# Patient Record
Sex: FEMALE | Race: BLACK OR AFRICAN AMERICAN | NOT HISPANIC OR LATINO | Employment: UNEMPLOYED | URBAN - METROPOLITAN AREA
[De-identification: names, ages, dates, MRNs, and addresses within clinical notes are randomized per-mention and may not be internally consistent; named-entity substitution may affect disease eponyms.]

---

## 2017-11-16 ENCOUNTER — HISTORICAL (OUTPATIENT)
Dept: FAMILY MEDICINE | Facility: CLINIC | Age: 82
End: 2017-11-16

## 2018-03-15 ENCOUNTER — HISTORICAL (OUTPATIENT)
Dept: ADMINISTRATIVE | Facility: HOSPITAL | Age: 83
End: 2018-03-15

## 2018-03-15 LAB
BUN SERPL-MCNC: 23 MG/DL (ref 7–18)
CALCIUM SERPL-MCNC: 9 MG/DL (ref 8.5–10.1)
CHLORIDE SERPL-SCNC: 107 MMOL/L (ref 98–107)
CO2 SERPL-SCNC: 29 MMOL/L (ref 21–32)
CREAT SERPL-MCNC: 1.2 MG/DL (ref 0.6–1.3)
CREAT/UREA NIT SERPL: 19
EST. AVERAGE GLUCOSE BLD GHB EST-MCNC: 194 MG/DL
GLUCOSE SERPL-MCNC: 87 MG/DL (ref 74–106)
HBA1C MFR BLD: 8.4 % (ref 4.2–6.3)
POTASSIUM SERPL-SCNC: 4.2 MMOL/L (ref 3.5–5.1)
SODIUM SERPL-SCNC: 144 MMOL/L (ref 136–145)

## 2018-04-26 ENCOUNTER — HISTORICAL (OUTPATIENT)
Dept: FAMILY MEDICINE | Facility: CLINIC | Age: 83
End: 2018-04-26

## 2018-06-26 ENCOUNTER — HISTORICAL (OUTPATIENT)
Dept: ENDOSCOPY | Facility: HOSPITAL | Age: 83
End: 2018-06-26

## 2018-07-05 ENCOUNTER — HISTORICAL (OUTPATIENT)
Dept: ADMINISTRATIVE | Facility: HOSPITAL | Age: 83
End: 2018-07-05

## 2018-07-05 LAB
ABS NEUT (OLG): 5.41 X10(3)/MCL (ref 2.1–9.2)
ALBUMIN SERPL-MCNC: 3.5 GM/DL (ref 3.4–5)
ALBUMIN/GLOB SERPL: 1 RATIO (ref 1–2)
ALP SERPL-CCNC: 84 UNIT/L (ref 45–117)
ALT SERPL-CCNC: 11 UNIT/L (ref 12–78)
AST SERPL-CCNC: 11 UNIT/L (ref 15–37)
BASOPHILS # BLD AUTO: 0.05 X10(3)/MCL
BASOPHILS NFR BLD AUTO: 1 %
BILIRUB SERPL-MCNC: 0.5 MG/DL (ref 0.2–1)
BILIRUBIN DIRECT+TOT PNL SERPL-MCNC: 0.1 MG/DL
BILIRUBIN DIRECT+TOT PNL SERPL-MCNC: 0.4 MG/DL
BUN SERPL-MCNC: 19 MG/DL (ref 7–18)
CALCIUM SERPL-MCNC: 9 MG/DL (ref 8.5–10.1)
CHLORIDE SERPL-SCNC: 108 MMOL/L (ref 98–107)
CHOLEST SERPL-MCNC: 176 MG/DL
CHOLEST/HDLC SERPL: 2.3 {RATIO} (ref 0–4.4)
CO2 SERPL-SCNC: 29 MMOL/L (ref 21–32)
CREAT SERPL-MCNC: 1.2 MG/DL (ref 0.6–1.3)
DEPRECATED CALCIDIOL+CALCIFEROL SERPL-MC: 21.38 NG/ML (ref 30–80)
EOSINOPHIL # BLD AUTO: 0.12 X10(3)/MCL
EOSINOPHIL NFR BLD AUTO: 1 %
ERYTHROCYTE [DISTWIDTH] IN BLOOD BY AUTOMATED COUNT: 14.4 % (ref 11.5–14.5)
GLOBULIN SER-MCNC: 3.9 GM/ML (ref 2.3–3.5)
GLUCOSE SERPL-MCNC: 132 MG/DL (ref 74–106)
HCT VFR BLD AUTO: 35.4 % (ref 35–46)
HDLC SERPL-MCNC: 77 MG/DL
HGB BLD-MCNC: 10.6 GM/DL (ref 12–16)
IMM GRANULOCYTES # BLD AUTO: 0.02 10*3/UL
IMM GRANULOCYTES NFR BLD AUTO: 0 %
LDLC SERPL CALC-MCNC: 69 MG/DL (ref 0–130)
LYMPHOCYTES # BLD AUTO: 2.76 X10(3)/MCL
LYMPHOCYTES NFR BLD AUTO: 30 % (ref 13–40)
MCH RBC QN AUTO: 28.8 PG (ref 26–34)
MCHC RBC AUTO-ENTMCNC: 29.9 GM/DL (ref 31–37)
MCV RBC AUTO: 96.2 FL (ref 80–100)
MONOCYTES # BLD AUTO: 0.72 X10(3)/MCL
MONOCYTES NFR BLD AUTO: 8 % (ref 4–12)
NEUTROPHILS # BLD AUTO: 5.41 X10(3)/MCL
NEUTROPHILS NFR BLD AUTO: 60 X10(3)/MCL
PLATELET # BLD AUTO: 361 X10(3)/MCL (ref 130–400)
PMV BLD AUTO: 10.1 FL (ref 7.4–10.4)
POTASSIUM SERPL-SCNC: 4.6 MMOL/L (ref 3.5–5.1)
PROT SERPL-MCNC: 7.4 GM/DL (ref 6.4–8.2)
RBC # BLD AUTO: 3.68 X10(6)/MCL (ref 4–5.2)
SODIUM SERPL-SCNC: 143 MMOL/L (ref 136–145)
TRIGL SERPL-MCNC: 151 MG/DL
VLDLC SERPL CALC-MCNC: 30 MG/DL
WBC # SPEC AUTO: 9.1 X10(3)/MCL (ref 4.5–11)

## 2019-04-25 ENCOUNTER — HISTORICAL (OUTPATIENT)
Dept: RADIOLOGY | Facility: HOSPITAL | Age: 84
End: 2019-04-25

## 2019-07-01 ENCOUNTER — HISTORICAL (OUTPATIENT)
Dept: RADIOLOGY | Facility: HOSPITAL | Age: 84
End: 2019-07-01

## 2019-09-20 ENCOUNTER — HISTORICAL (OUTPATIENT)
Dept: RADIOLOGY | Facility: HOSPITAL | Age: 84
End: 2019-09-20

## 2021-03-25 ENCOUNTER — HISTORICAL (OUTPATIENT)
Dept: ADMINISTRATIVE | Facility: HOSPITAL | Age: 86
End: 2021-03-25

## 2021-03-25 LAB — EST CREAT CLEARANCE SER (OHS): 20.9 ML/MIN

## 2021-03-29 ENCOUNTER — HISTORICAL (OUTPATIENT)
Dept: RADIOLOGY | Facility: HOSPITAL | Age: 86
End: 2021-03-29

## 2021-09-09 ENCOUNTER — HISTORICAL (OUTPATIENT)
Dept: ADMINISTRATIVE | Facility: HOSPITAL | Age: 86
End: 2021-09-09

## 2021-09-23 ENCOUNTER — HISTORICAL (OUTPATIENT)
Dept: RADIOLOGY | Facility: HOSPITAL | Age: 86
End: 2021-09-23

## 2021-11-24 ENCOUNTER — HISTORICAL (OUTPATIENT)
Dept: ADMINISTRATIVE | Facility: HOSPITAL | Age: 86
End: 2021-11-24

## 2021-11-26 LAB — FINAL CULTURE: NORMAL

## 2022-02-16 ENCOUNTER — HOSPITAL ENCOUNTER (OUTPATIENT)
Dept: MEDSURG UNIT | Facility: HOSPITAL | Age: 87
End: 2022-02-21
Attending: HOSPITALIST

## 2022-02-16 ENCOUNTER — HISTORICAL (OUTPATIENT)
Dept: ADMINISTRATIVE | Facility: HOSPITAL | Age: 87
End: 2022-02-16

## 2022-02-16 LAB
ABS NEUT (OLG): 8.11 (ref 2.1–9.2)
ALBUMIN SERPL-MCNC: 3.3 G/DL (ref 3.4–4.8)
ALBUMIN/GLOB SERPL: 0.9 {RATIO} (ref 1.1–2)
ALP SERPL-CCNC: 134 U/L (ref 40–150)
ALT SERPL-CCNC: 7 U/L (ref 0–55)
AST SERPL-CCNC: 7 U/L (ref 5–34)
B-OH-BUTYR SERPL-MCNC: 0.16 MG/DL
B-OH-BUTYR SERPL-MCNC: 0.19 MG/DL
BASOPHILS # BLD AUTO: 0 10*3/UL (ref 0–0.2)
BASOPHILS NFR BLD AUTO: 0 %
BILIRUB SERPL-MCNC: 0.5 MG/DL
BILIRUBIN DIRECT+TOT PNL SERPL-MCNC: 0.2 (ref 0–0.5)
BILIRUBIN DIRECT+TOT PNL SERPL-MCNC: 0.3 (ref 0–0.8)
BUN SERPL-MCNC: 28.5 MG/DL (ref 9.8–20.1)
BUN SERPL-MCNC: 32.2 MG/DL (ref 9.8–20.1)
BUN SERPL-MCNC: 33.3 MG/DL (ref 9.8–20.1)
CALCIUM SERPL-MCNC: 8.5 MG/DL (ref 8.7–10.5)
CALCIUM SERPL-MCNC: 8.6 MG/DL (ref 8.7–10.5)
CALCIUM SERPL-MCNC: 9.4 MG/DL (ref 8.7–10.5)
CBG: 425 (ref 70–115)
CBG: 517 (ref 70–115)
CBG: >600 (ref 70–115)
CBG: >600 (ref 70–115)
CHLORIDE SERPL-SCNC: 103 MMOL/L (ref 98–111)
CHLORIDE SERPL-SCNC: 107 MMOL/L (ref 98–111)
CHLORIDE SERPL-SCNC: 93 MMOL/L (ref 98–111)
CO2 SERPL-SCNC: 14 MMOL/L (ref 23–31)
CO2 SERPL-SCNC: 16 MMOL/L (ref 23–31)
CO2 SERPL-SCNC: 18 MMOL/L (ref 23–31)
CREAT SERPL-MCNC: 1.67 MG/DL (ref 0.55–1.02)
CREAT SERPL-MCNC: 2.1 MG/DL (ref 0.55–1.02)
CREAT SERPL-MCNC: 2.5 MG/DL (ref 0.55–1.02)
CREAT/UREA NIT SERPL: 15
CREAT/UREA NIT SERPL: 17
EOSINOPHIL # BLD AUTO: 0 10*3/UL (ref 0–0.9)
EOSINOPHIL NFR BLD AUTO: 0 %
ERYTHROCYTE [DISTWIDTH] IN BLOOD BY AUTOMATED COUNT: 14.5 % (ref 11.5–14.5)
FLAG2 (OHS): 70
FLAG3 (OHS): 80
FLAGS (OHS): 80
GLOBULIN SER-MCNC: 3.7 G/DL (ref 2.4–3.5)
GLUCOSE SERPL-MCNC: 480 MG/DL (ref 75–121)
GLUCOSE SERPL-MCNC: 561 MG/DL (ref 75–121)
GLUCOSE SERPL-MCNC: 826 MG/DL (ref 75–121)
HCO3 UR-SCNC: 17.6 MMOL/L (ref 22–26)
HCT VFR BLD AUTO: 36.3 % (ref 35–46)
HEMOLYSIS INTERF INDEX SERPL-ACNC: 15
HEMOLYSIS INTERF INDEX SERPL-ACNC: 15
HEMOLYSIS INTERF INDEX SERPL-ACNC: 2
HEMOLYSIS INTERF INDEX SERPL-ACNC: 2
HEMOLYSIS INTERF INDEX SERPL-ACNC: 29
HEMOLYSIS INTERF INDEX SERPL-ACNC: 29
HGB BLD-MCNC: 11.5 G/DL (ref 12–16)
ICED SAMPLE: NO
ICTERIC INTERF INDEX SERPL-ACNC: 0
IMM GRANULOCYTES # BLD AUTO: 0.04 10*3/UL
IMM GRANULOCYTES NFR BLD AUTO: 0 %
IMM. NE 2 SUSPECT FLAG (OHS): 10
LIPEMIC INTERF INDEX SERPL-ACNC: 2
LIPEMIC INTERF INDEX SERPL-ACNC: <0
LIPEMIC INTERF INDEX SERPL-ACNC: <0
LOW EVENT # SUSPECT FLAG (OHS): 80
LYMPHOCYTES # BLD AUTO: 1.7 10*3/UL (ref 0.6–4.6)
LYMPHOCYTES NFR BLD AUTO: 16 %
MAGNESIUM SERPL-MCNC: 1.8 MG/DL (ref 1.6–2.6)
MAGNESIUM SERPL-MCNC: 1.9 MG/DL (ref 1.6–2.6)
MAGNESIUM SERPL-MCNC: 2.1 MG/DL (ref 1.6–2.6)
MANUAL DIFF? (OHS): NO
MCH RBC QN AUTO: 28.7 PG (ref 26–34)
MCHC RBC AUTO-ENTMCNC: 31.7 G/DL (ref 31–37)
MCV RBC AUTO: 90.5 FL (ref 80–100)
MO BLASTS SUSPECT FLAG (OHS): 30
MONOCYTES # BLD AUTO: 0.6 10*3/UL (ref 0.1–1.3)
MONOCYTES NFR BLD AUTO: 6 %
NEUTROPHILS # BLD AUTO: 8.11 10*3/UL (ref 2.1–9.2)
NEUTROPHILS NFR BLD AUTO: 77 %
NRBC BLD AUTO-RTO: 0 % (ref 0–0.2)
O2 HGB ARTERIAL: 91.7 (ref 94–100)
OSMOLALITY SERPL: 312 MOSM/KG (ref 280–300)
PCO2 BLDA: 35 MM[HG] (ref 35–45)
PH SMN: 7.31 [PH] (ref 7.35–7.45)
PHOSPHATE SERPL-MCNC: 2.8 MG/DL (ref 2.3–4.7)
PHOSPHATE SERPL-MCNC: 3.1 MG/DL (ref 2.3–4.7)
PLATELET # BLD AUTO: 381 10*3/UL (ref 130–400)
PLATELET CLUMPS SUSPECT FLAG (OHS): 30
PMV BLD AUTO: 10.7 FL (ref 7.4–10.4)
PO2 BLDA: 75 MM[HG] (ref 80–100)
POC ALLENS TEST: POSITIVE
POC BE: -7.9 (ref -2–2)
POC CO HGB: 4
POC CO2: 18.7 (ref 22–26)
POC MET HGB: 1.3 (ref 0.4–1.5)
POC SAMPLESOURCE: NORMAL
POC SATURATED O2: 96.7
POC SITE: NORMAL
POC TECH: NORMAL
POC THB: 11 (ref 12–16)
POC TREATMENT: NORMAL
POTASSIUM SERPL-SCNC: 5 MMOL/L (ref 3.5–5.1)
POTASSIUM SERPL-SCNC: 5 MMOL/L (ref 3.5–5.1)
POTASSIUM SERPL-SCNC: 5.5 MMOL/L (ref 3.5–5.1)
PROT SERPL-MCNC: 7 G/DL (ref 5.8–7.6)
RBC # BLD AUTO: 4.01 10*6/UL (ref 4–5.2)
SARS-COV-2 AG RESP QL IA.RAPID: NEGATIVE
SODIUM SERPL-SCNC: 124 MMOL/L (ref 132–146)
SODIUM SERPL-SCNC: 129 MMOL/L (ref 132–146)
SODIUM SERPL-SCNC: 132 MMOL/L (ref 132–146)
TROPONIN I SERPL-MCNC: <0.01 NG/ML (ref 0–0.04)
WBC # SPEC AUTO: 10.5 10*3/UL (ref 4.5–11)

## 2022-02-17 LAB
ABS NEUT (OLG): 6.58 (ref 2.1–9.2)
BASOPHILS # BLD AUTO: 0 10*3/UL (ref 0–0.2)
BASOPHILS NFR BLD AUTO: 0 %
BUN SERPL-MCNC: 20.5 MG/DL (ref 9.8–20.1)
BUN SERPL-MCNC: 23.1 MG/DL (ref 9.8–20.1)
BUN SERPL-MCNC: 25.8 MG/DL (ref 9.8–20.1)
CALCIUM SERPL-MCNC: 7.9 MG/DL (ref 8.7–10.5)
CALCIUM SERPL-MCNC: 8.2 MG/DL (ref 8.7–10.5)
CALCIUM SERPL-MCNC: 9.2 MG/DL (ref 8.7–10.5)
CBG: 263 (ref 70–115)
CBG: 311 (ref 70–115)
CBG: 337 (ref 70–115)
CBG: 357 (ref 70–115)
CBG: 367 (ref 70–115)
CHLORIDE SERPL-SCNC: 109 MMOL/L (ref 98–111)
CHLORIDE SERPL-SCNC: 110 MMOL/L (ref 98–111)
CHLORIDE SERPL-SCNC: 110 MMOL/L (ref 98–111)
CO2 SERPL-SCNC: 16 MMOL/L (ref 23–31)
CO2 SERPL-SCNC: 18 MMOL/L (ref 23–31)
CO2 SERPL-SCNC: 18 MMOL/L (ref 23–31)
CREAT SERPL-MCNC: 1.29 MG/DL (ref 0.55–1.02)
CREAT SERPL-MCNC: 1.31 MG/DL (ref 0.55–1.02)
CREAT SERPL-MCNC: 1.48 MG/DL (ref 0.55–1.02)
CREAT/UREA NIT SERPL: 16
CREAT/UREA NIT SERPL: 17
CREAT/UREA NIT SERPL: 18
EOSINOPHIL # BLD AUTO: 0.1 10*3/UL (ref 0–0.9)
EOSINOPHIL NFR BLD AUTO: 1 %
ERYTHROCYTE [DISTWIDTH] IN BLOOD BY AUTOMATED COUNT: 14.5 % (ref 11.5–14.5)
FLAG2 (OHS): 70
FLAG3 (OHS): 80
FLAGS (OHS): 80
GLUCOSE SERPL-MCNC: 341 MG/DL (ref 75–121)
GLUCOSE SERPL-MCNC: 415 MG/DL (ref 75–121)
GLUCOSE SERPL-MCNC: 496 MG/DL (ref 75–121)
HCT VFR BLD AUTO: 29.8 % (ref 35–46)
HEMOLYSIS INTERF INDEX SERPL-ACNC: 1
HEMOLYSIS INTERF INDEX SERPL-ACNC: 5
HGB BLD-MCNC: 9.4 G/DL (ref 12–16)
ICTERIC INTERF INDEX SERPL-ACNC: 0
IMM GRANULOCYTES # BLD AUTO: 0.04 10*3/UL
IMM GRANULOCYTES NFR BLD AUTO: 0 %
LIPEMIC INTERF INDEX SERPL-ACNC: 1
LIPEMIC INTERF INDEX SERPL-ACNC: 3
LIPEMIC INTERF INDEX SERPL-ACNC: <0
LOW EVENT # SUSPECT FLAG (OHS): 80
LYMPHOCYTES # BLD AUTO: 2 10*3/UL (ref 0.6–4.6)
LYMPHOCYTES NFR BLD AUTO: 21 %
MAGNESIUM SERPL-MCNC: 2.4 MG/DL (ref 1.6–2.6)
MAGNESIUM SERPL-MCNC: 2.7 MG/DL (ref 1.6–2.6)
MANUAL DIFF? (OHS): NO
MCH RBC QN AUTO: 28.8 PG (ref 26–34)
MCHC RBC AUTO-ENTMCNC: 31.5 G/DL (ref 31–37)
MCV RBC AUTO: 91.4 FL (ref 80–100)
MO BLASTS SUSPECT FLAG (OHS): 30
MONOCYTES # BLD AUTO: 0.6 10*3/UL (ref 0.1–1.3)
MONOCYTES NFR BLD AUTO: 7 %
NEUTROPHILS # BLD AUTO: 6.58 10*3/UL (ref 2.1–9.2)
NEUTROPHILS NFR BLD AUTO: 70 %
NRBC BLD AUTO-RTO: 0 % (ref 0–0.2)
PHOSPHATE SERPL-MCNC: 3.3 MG/DL (ref 2.3–4.7)
PHOSPHATE SERPL-MCNC: 4 MG/DL (ref 2.3–4.7)
PLATELET # BLD AUTO: 294 10*3/UL (ref 130–400)
PLATELET CLUMPS SUSPECT FLAG (OHS): 20
PMV BLD AUTO: 10.4 FL (ref 7.4–10.4)
POTASSIUM SERPL-SCNC: 4.2 MMOL/L (ref 3.5–5.1)
POTASSIUM SERPL-SCNC: 4.5 MMOL/L (ref 3.5–5.1)
POTASSIUM SERPL-SCNC: 4.5 MMOL/L (ref 3.5–5.1)
RBC # BLD AUTO: 3.26 10*6/UL (ref 4–5.2)
SODIUM SERPL-SCNC: 133 MMOL/L (ref 132–146)
SODIUM SERPL-SCNC: 135 MMOL/L (ref 132–146)
SODIUM SERPL-SCNC: 135 MMOL/L (ref 132–146)
WBC # SPEC AUTO: 9.4 10*3/UL (ref 4.5–11)

## 2022-02-18 LAB
BUN SERPL-MCNC: 17.3 MG/DL (ref 9.8–20.1)
CALCIUM SERPL-MCNC: 9.2 MG/DL (ref 8.7–10.5)
CBG: 118 (ref 70–115)
CBG: 160 (ref 70–115)
CBG: 354 (ref 70–115)
CBG: 355 (ref 70–115)
CBG: 369 (ref 70–115)
CBG: 75 (ref 70–115)
CHLORIDE SERPL-SCNC: 112 MMOL/L (ref 98–111)
CO2 SERPL-SCNC: 20 MMOL/L (ref 23–31)
CREAT SERPL-MCNC: 1.05 MG/DL (ref 0.55–1.02)
CREAT/UREA NIT SERPL: 16
GLUCOSE SERPL-MCNC: 83 MG/DL (ref 75–121)
HEMOLYSIS INTERF INDEX SERPL-ACNC: 3
ICTERIC INTERF INDEX SERPL-ACNC: 0
LIPEMIC INTERF INDEX SERPL-ACNC: <0
POTASSIUM SERPL-SCNC: 4.2 MMOL/L (ref 3.5–5.1)
SODIUM SERPL-SCNC: 138 MMOL/L (ref 132–146)

## 2022-02-19 LAB
CBG: 165 (ref 70–115)
CBG: 167 (ref 70–115)
CBG: 249 (ref 70–115)
CBG: 287 (ref 70–115)
CBG: 297 (ref 70–115)
CBG: 301 (ref 70–115)
CBG: 400 (ref 70–115)

## 2022-02-20 LAB
CBG: 128 (ref 70–115)
CBG: 151 (ref 70–115)
CBG: 253 (ref 70–115)
CBG: 272 (ref 70–115)
CBG: 414 (ref 70–115)

## 2022-02-21 LAB
CBG: 195 (ref 70–115)
CBG: 318 (ref 70–115)
CBG: 73 (ref 70–115)
CBG: 81 (ref 70–115)

## 2022-04-07 ENCOUNTER — HISTORICAL (OUTPATIENT)
Dept: ADMINISTRATIVE | Facility: HOSPITAL | Age: 87
End: 2022-04-07
Payer: MEDICAID

## 2022-04-24 VITALS
WEIGHT: 158.75 LBS | SYSTOLIC BLOOD PRESSURE: 115 MMHG | BODY MASS INDEX: 29.21 KG/M2 | OXYGEN SATURATION: 100 % | HEIGHT: 62 IN | DIASTOLIC BLOOD PRESSURE: 72 MMHG

## 2022-05-01 NOTE — HISTORICAL [facility] CERNER
This is a historical note converted from [employer]. Formatting and pictures may have been removed.  Please reference [employer]jose for original formatting and attached multimedia. Chief Complaint  HERE FOR FOLLOWUP VISIT C/O NOT SLEEPING AT TIME C/O BURING SENSTION TO THROAT  History of Present Illness  87 yo F here for f/u on chronic conditions .  ?   Acute Issues: Patient is doing well today. Offers no new complaints or concerns. Compliant with medications. Checks sugars at home occasionally, ranging 115-195. Last HgbA1c was 8.4?in 03/2018.?Does not check BP at home, but is compliant with medication. Does not exercise and is not compliant with diet. BMs have been regular, every other day. Patient admits to not sleeping well at night. Having trouble falling asleep. Neuropathy is improving. GERD is stable, not worsening. Carotid US was performed on 4/2018, no significant plaque disease.Patient had cystoscopy done 04/2018, which demonstrated some bladder hyperplasia, but otherwise normal.  ?   Chronic Issues:  DM  CKD  HTN  HLD  Microscopic Hematuria  GERD  Insomnia  Neuropathy (lower extremity)  Chronic Constipation  Tobacco Use  ?  ?   Falls: No recent?falls  Surgeries: No surgeries  Social: Lives at home with . No pets in home. Smoke detectors present. No stairs in home. AC and Heat present. Has family members nearby. Smoke about 3 cigarettes daily. Non-drinker. No illicit drug use. Patient is retired.  ADLs: Performs all by self  Diet:?Eats?3 meals daily.?Consumes toast, coffee, vegetables, meat, fish rice, chicken. Drinks water daily.  ?  Health Maintenance:  Colonoscopy: NEG (07/2008). Patient refuses repeat.  Tdap: (03/2012)  Flu: (11/2017)  Pneumovax: (11/2006)  Prevnar: (12/2006)  Zostavax:  Eye Exam: (11/2017)  CAGE: NEG  Mammogram: NEG (10/2014). Refuses repeat  DEXA: Osteopenia: Z score: -1.5 (2007)  ?  Review of Systems  Constitutional: no fever, no chills, no weight loss  CV: no swelling, no edema, no  chest pain, no palpitations  ENT: no cough, no nasal congestion  : no urinary retention, no urinary incontinence  GI: no fecal incontinence, no constipation, no diarrhea, no nausea, no vomiting  RESP: no SOB, no wheezing, no difficulty breathing  Skin: no rash, no wound, no discolorations  Neuro: no numbness/tingling, no weakness, no saddle anesthesia, no syncope  MSK: no limb swelling, no limb deformities, no gait disturbances  Psych: no depression, no anxiety  ?  Physical Exam  Vitals & Measurements  T:?36.7? ?C (Oral)? HR:?73(Peripheral)? RR:?20? BP:?149/69? SpO2:?99%? WT:?74.3?kg?  General: well developed; appears happy and cooperative  PSYCH: alert and oriented with?appropriate mood and affect  ENT: oral mucosa moist, no pharyngeal erythema, EOMI, no pallor conjunctiva  SKIN: inspection and palpation of skin and soft tissue normal; no scars noted on upper/lower extremities  RESP: lungs CTA b/l, no wheezing, non-labored respirations  CV: +2 symmetrical pulses (radial), no edema, no murmurs, rubs or gallops upon auscultation  NEURO: sensation intact by light touch of the lower and upper extremities b/l; no focal deficits  MSK: no physical deformities, no gait disturbances  ABD: NTTP, BS +4, no organomegaly  ?   Monofilament test performed today: No sensory deficits  Assessment/Plan  1.?Diabetes  -Continue diabetic diet  -Continue Novolog and Metformin  -Continue valsartan daily  Ordered:  ?  2.?CKD (chronic kidney disease)  ?-Avoid Nephrotoxic drugs  ?-drink plenty of fluids  Ordered:  ?  3.?HTN (hypertension)  -Continue home BP checks  -Continue Metoprolol and Chlorthalidone daily  Ordered:  ?  4.?HLD (hyperlipidemia)  ?-Continue pravastatin 10mg daily  Ordered:  ?  5.?Microscopic hematuria  ?-Cystoscopy performed  ?-No pathology found  ?-Stable  Ordered:  ?  6.?Chronic GERD  -Continue PPI daily  -stable  Ordered:  ?  7.?Insomnia(  Confirmed  )  ?-Will DC Duloxetine and start Sertraline?25mg daily  ?-Patient  advised to take one duloxetine tablet every other day for two Weeks To wean off medication  Ordered:  ?  8.?Neuropathy  -Continue Gabapentin 300mg tid  Ordered:  ?  9.?Constipation  -Continue Miralax as needed, bid  Ordered:  ?  10.?Tobacco use  -Advised to quit; patient not ready  Ordered:  ?  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(  Confirmed  )  Dysphagia(  Confirmed  )  GERD (gastroesophageal reflux disease)  HTN (hypertension)(  Confirmed  )  Hyperlipidemia(  Confirmed  )  Insomnia(  Confirmed  )  Malaise and fatigue(  Confirmed  )  Microscopic hematuria  Neuropathy  Tobacco use  Tobacco user  Tobacco user  Vitamin D deficiency(  Confirmed  )  Historical  No qualifying data  Procedure/Surgical History  Cystourethroscopy (separate procedure) (06/26/2018), Inspection of Bladder, Via Natural or Artificial Opening Endoscopic (06/26/2018), Supracervical abdominal hysterectomy (subtotal hysterectomy), with or without removal of tube(s), with or without removal of ovary(s).  Medications  Inpatient  No active inpatient medications  Home  aspirin 81 mg oral Delayed Release (EC) tablet, See Instructions, 2 refills  Centrum Silver oral tablet  chlorthalidone 25 mg oral tablet, 25 mg= 1 tab(s), Oral, Daily, 5 refills  contour test strips, See Instructions, 5 refills  Cymbalta 20 mg oral delayed release capsule, 20 mg, Oral, Daily, 3 refills  melatonin 5 mg oral tablet, 5 mg= 1 tab(s), Oral, Once a day (at bedtime), PRN, 1 refills,? ?Not taking: PT STOPPED  metFORMIN 1000 mg oral tablet, 1000 mg= 1 tab(s), Oral, BID, 5 refills  metoprolol tartrate 50 mg oral  tab, 50 mg= 1 tab(s), Oral, BID, 5 refills  NovoLOG Mix 70/30 subcutaneous suspension, 26 units, Subcutaneous, BID, 3 refills  omeprazole 20 mg oral DR capsule, 20 mg= 1 cap(s), Oral, Daily, PRN  polyethylene glycol 3350 oral powder for reconstitution, 17 gm, Oral, Daily, 6 refills  potassium chloride 10 mEq oral TABLET extended release, 20 mEq= 2 tab(s), Oral, BID, 5 refills  pravastatin 10 mg oral tablet, 20 mg= 2 tab(s), Oral, Daily, 5 refills  valsartan 160 mg oral tablet, 160 mg= 1 tab(s), Oral, Daily, 5 refills  Vitamin D3 5000 intl units oral tablet, 5000 IntUnit= 1 tab(s), Oral, Daily, 11 refills  Allergies  lisinopril?(swelling (tongue/lips))  Social History  Alcohol  Past, Wine, Liquor, 05/12/2015  Employment/School  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 Home equipment: Glucose monitoring. Alcohol abuse in household: No. Substance abuse in household: No. Smoker in household: No. Injuries/Abuse/Neglect in household: No. Feels unsafe at home: No. Safe place to go: Yes. Agency(s)/Others notified: No. Family/Friends available for support: No. Concern for family members at home: No. Major illness in household: No. Financial concerns: No., 05/12/2015  Nutrition/Health  Diabetic, Sleeping concerns: Yes. Feels highly stressed: No., 05/12/2015  Sexual - No Sexual Activity, 05/12/2015  Substance Abuse - Denies Substance Abuse, 05/12/2015  Never, 03/15/2018  Tobacco  Current every day smoker, Cigarettes, 6 per day. Previous treatment: None. Ready to change: No. Household tobacco concerns: No., 01/13/2017  Family History  CAD (coronary artery disease): Brother.  Cancer of gallbladder: Father.  Diabetes mellitus type 2: Sister and Brother.  Hypertension.: Sister and Brother.  Stroke: Negative: Mother.  Immunizations  Vaccine Date Status   influenza virus vaccine,  inactivated 11/16/2017 Given   influenza virus vaccine, inactivated 09/29/2016 Given   influenza virus vaccine, inactivated 12/17/2015 Given   pneumococcal 13-valent conjugate vaccine 12/17/2015 Given   influenza virus vaccine, inactivated 12/01/2014 Recorded       I discussed and agree with the residents findings and plan as documented in the residents note.  ?  Hx of Syncope:?No recent episodes. Orthostatic and EKG in clinic were normal.? Echocardiogram done 4/26/18- EF-55%, diastolic dysfunction?and carotid US done 4/26/18- mild plaque but not hemodynamically significant.  ?   DM:?[id number]?. Continue ?Continue Metformin 1000mg Bid and Novolog 70/30 24 units BID. ?Last eye exam: 2/2017- pt referred back to Dr Arteaga for exam?. Foot exam on 7/5/2018 Pt does not check home glucose, but no known hypoglycemia- do not want to control too tightly  ?  CKD:?monitor.?  ?  HTN:?stable, Continue Chlorthalidone 25 mg daily, Metoprolol Tartrate 50 mg BID, Valsartan 160 mg daily, Potassium 20 meq/day. DASH diet encouraged.  ?  HLD:?Continue Pravastatin 20 mg.?  ?  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  GERD:?Continue Omeprazole 20 mg daily?, quit tob  ?  Insomnia:? Melatonin?and trazodone did not?work, discussed risks of ambien. Gabapentin 300mg 2 qhs not?working. Will?stop cymbalta and start zoloft 25mg 1 qhs. ?Sleep hygiene discussed, avoid TV before bed.  ?  Lower extremity neuropathy:??Pt does not think cymbalta helps neuropathy muchso will?wean off?cymbalta 60mg every other day for 2 weeks and tene stop. Will start sertraline 25mg?qhs.  ?   Cognitive impairment due to low education level, possibly gabapentin and dementia-?no safety concerns- not driving,  puts meds in pillbox  3rd grade education Named 9 animals on 9/29/16  Cognitive exam done 3/15/10-17/30 (missed 1/3 on delayed recall, ?command, sentence,diagram) , named 9 animals  Cognitive  exam done 6/20/12-16/30 (missed 2/3 on delayed recall, ?command, sentence,diagram) , named 8 animals  Cognitive exam done 5/9/13-18/30 (missed 3/3 on delayed recall, ?command, sentence,diagram) , named 10 animals  Cognitive exam done 8/3/14- 20/30, named 7 animals  Cognitive exam done 3/31/16-18/30 (missed 3/3 on delayed recall, phrase, command, sentence,diagram)?  SLUMS done 7/5/18- 11/30 (missed 1/5 on recall, number backwards, clock, triangle, 6 pts on story) Named 8 animals  ?   ?  Constipation :?stable with Miralax, continue Miralax PRN?  ?  Tobacco abuse counseling -?Encouraged smoking cessation, patient only smokes when she can afford cigarettes - not willing to quit at this time. Discussed harm to health, increase chance of getting MI or stroke.  ?  ?  HM  ??- colonoscopy 7/2008- pt refuses future colonoscopy  ??- DEXA 2007: osteopenia T score of lt fem neck was -1.5; pt still refusing DXA - but will consider in future, may want to discuss at next visit  ??- mammo 10/2014: neg; declines further exams  ??- Tdap 3/2012  ??- PVX 11/2006  ??- prevnar 12/2015?  ??  ?  Per last note at geriatric clinic on 7/2017  Advance directives- about 30mins was spent in discussion and completing forms ( billed on 7/6/17). Pt wants full code but does not want prolonged life support and does not want feeding tube. Pt appoints  Scotty Saenz as 1st HC POA and nephew Lee Kaufman as 2nd HC POA.?  ?  RTC 4 months F1 clinic with ?  ?  ?  ?  ?

## 2022-05-02 DIAGNOSIS — Z87.891 PERSONAL HISTORY OF TOBACCO USE, PRESENTING HAZARDS TO HEALTH: Primary | ICD-10-CM

## 2022-05-04 DIAGNOSIS — Z87.891 HISTORY OF TOBACCO USE: Primary | ICD-10-CM

## 2022-05-14 NOTE — DISCHARGE SUMMARY
Admit and Discharge Dates  Admit Date: 02/16/2022  Discharge Date: 02/21/2022  Physicians  Attending Physician - Ileana TURNER, Jason ANTONY  Admitting Physician - Ileana TURNER, Jason ANTONY  Consulting Physician - Nicki CALVO, Kiley  Primary Care Physician - Ulises TURNER, Kimberly PINTO  Discharge Diagnosis  Acute hyperglycemia?R73.9  Dysphagia?R13.10  Hyperglycemia?[id number]  Surgical Procedures  No procedures recorded for this visit.  Immunizations  02/17/2022 - tuberculin purified protein derivative?  Admission Information  89-year-old female past medical history diabetes, diastolic heart failure, hypertension, poor mobility admitted 2/16/2022 for HHS and metabolic acidosis.? See H&P from 2/16/2022 for additional details.  Significant Findings  On admission patient in metabolic acidosis pH 7.31, hyperglycemic with blood glucose 826.? Patient started on DKA protocol for HHS, downgraded following day once metabolic acidosis resolved and gap closed.? Patients Lantus regimen adjusted to 26 units at bedtime and 30 units in the morning.? Patient initially to discharge to SNF however insurance denied.? Patient set up with home health discussed with  and nephew who are available to assist patient with care at home.? On day of discharge patient feeling at baseline.? Home medication changes and adjustments discussed with patient,  and nephew all stated understanding.  Time Spent on discharge  40min  Objective  Vitals & Measurements  T:?36.7? ?C (Oral)? TMIN:?36.6? ?C (Oral)? TMAX:?37.1? ?C (Oral)? HR:?89(Peripheral)? RR:?17? BP:?134/73? SpO2:?99%?  Physical Exam  General: appears well, in no acute distress  Neck: supple, no JVD  Respiratory: clear to auscultation bilaterally, nonlabored respirations  Cardiovascular: regular rate and rhythm, holosystolic murmur appreciated; no edema  Gastrointestinal:?soft,?non-tender, non-distended,?normoactive bowel sounds  Patient Discharge Condition  stable  Discharge  Disposition  Discharge home with medication changes, discussed with family at time of discharge. Return precautions given.   Discharge Medication Reconciliation  Prescribed  insulin glargine (Lantus Solostar Pen 100 units/mL subcutaneous solution)?See Instructions  polyethylene glycol 3350 (MiraLax oral powder for reconstitution)?17 gm, Oral, Daily  Continue  Misc Prescription (Diabetic test strips)?See Instructions  Misc Prescription (Glucometer)?See Instructions  Misc Prescription (Rollator)?See Instructions  Misc Prescription (contour test strips)?See Instructions  amLODIPine (amlodipine 10 mg oral tablet)?10 mg, Oral, Daily  aspirin (aspirin 81 mg oral Delayed Release (EC) tablet)?See Instructions  cholecalciferol (cholecalciferol 2000 intl units (50 mcg) oral capsule)?2,000 IntUnit, Oral, Daily  ferrous sulfate (FeroSul 325 mg (65 mg elemental iron) oral tablet)?325 mg, Oral, Daily  gabapentin (gabapentin 300 mg oral capsule)?300 mg, Oral, BID  metoprolol (metoprolol tartrate 50 mg oral tab)?50 mg, Oral, BID  multivitamin with minerals (Centrum Silver oral tablet)  omeprazole (omeprazole 40 mg oral DR capsule)?40 mg, Oral, Daily  pravastatin (pravastatin 20 mg oral tablet)?20 mg, Oral, qPM  traZODone (traZODONE 50 mg oral tablet ( Desyrel ))?50 mg, Oral, Once a day (at bedtime)  Discontinue  DULoxetine (Cymbalta 60 mg oral delayed release capsule)?60 mg, Oral, Daily  chlorthalidone (chlorthalidone 25 mg oral tablet)?25 mg, Oral, Daily  insulin aspart-insulin aspart protamine (NovoLOG Mix 70/30 subcutaneous suspension)?26 units, Subcutaneous, BID  losartan (losartan 50 mg oral tablet)?50 mg, Oral, BID  metFORMIN (metFORMIN 1000 mg oral tablet)?See Instructions  Education and Orders Provided  Heart Failure  Follow up  Report to Emergency Department if symptoms return or worsen  [facility], within 1 to 2 weeks  ????Office will call with appointment

## 2022-05-20 NOTE — HISTORICAL [facility] C[facility]ER
This is a historical note converted from Luis. Formatting and pictures may have been removed.  Please reference Luis for original formatting and attached multimedia. Chief Complaint  Per nephew and , patient had elevated blood sugar at home  History of Present Illness  ?  89 yo Female with a pmhx DM2, diastolic HF, Htn, poor mobility presents with one day of elevated blood sugar.? The patient only states she had dizziness today and has chronic leg pain.? She thinks the dizziness is due to high sugars with increased urination.??She admits to not taking her insulin today and is shocked to know her sugars are in 800s. ?She denies chest pain, sob, headaches, blurry vision, n/v/d or bleeding.? After talking to the patient, the  was called who said that she has been refusing her insulin at home, has been drinking a lot of juice like orange juice and green tea.? Home insulin is Novolog 70/20 26 bid. Per , patient has not been the same since she was last discharged from hospital stay and sent to rehab/[facility].? The patients memory has not been good, she is forgetful.? He denies that the patient has been sick or ill today or of recently; states patients mother has history of dementia.? The patietn has chronic pain from osteoarthritis and has not gotten out of bed in last 24 hours.  ?  PMHx: DM2, HFpEF, Htn, Osteoarthritis with poor mobility  PSHx: left knee replacement  Allx: lisinopril  Tobacco: smokes 2-6 cigarettes a day, has smoked all her life  Alcohol: used to drink 1-2 drinks a day; has not drank in 30-40 years\   Travel: denies recent travel  ?  Review of Systems  Gen: as above  HEENT: as above  Card: as above  Resp: as above  Abd: as above  Msk: as above  Neuro: denies numbness or tingling  Skin: denies open wounds or rashes  Heme: denies bleeding  Physical Exam  Vitals & Measurements  T:?36.6? ?C (Oral)? HR:?86(Peripheral)? HR:?76(Monitored)? RR:?15? BP:?109/63? SpO2:?96%? WT:?78?kg?  Gen:  alert and oriented, follows commands  HEENT: PERRL, unable to accommodate as patient does not have glasses on and has bad eyesight, no scleral icterus  Neck: no JVD, supple  Resp: mild coarse breath sounds, no wheezes or crackles. good air movement bilaterally  Card: RRR, 3/6 systolic murmur heard best over second intercostal space on the right  Abd: BS evident, soft, nontender to palpation, no organomegaly  : no suprapubic tenderness  Skin: dry skin in LE, no rashes or bruising on UE or Le; no sacral ulcers; no ulcerations or wounds on feet  Msk:? mild swelling bilateral ankles; poor mobility with patient needing help to sit up in bed  Assessment/Plan  ?  ?  HHS  -Admit to ICU for Insulin Drip and DKA protocol  -BMPs q 4 hours to monitor K+  -NPO currently until off of DKA drip  -CXR negative for acute pathology, blood cultures x2, urine culture, resp culture  -pH 7.31, BHROB negative  -continue ASA 81mg, Atorvastatin 20mg  ?  Metabolic Acidosis  ROXANNE on CKD 3a  -continue fluid hydration with NS  -expect Bicarb to increase as sugars decrease and ROXANNE resolves  ?  ?  HFpEF  Htn  Tobacco User  -Troponin negative; EKG reveals NSR, Left axis deviation, prolonged , no ST elevations or depressions  -Echo in 2021 revealed Ef 60% with diastolic dysfunction  -Holding BBs and diuretics as patient having BPs on lower end as well dehydrated  -will monitor I and O/I; patient will have pure wick catheter on  -Labetalol 10 prn; Hydralazine 10 prn  ?  Osteoarthritis  Impaired Mobility  -PT consulted  -OT consulted  -consult to CM for SNF;  agrees to SNF but does not think wife ready yet for nursing home  ?  ?  Iron Deficiency Anemia  -per labs on 11/22022  -will hold iron supplementation inpatient while patient getting sugars under control  ?  Insomnia  -Continue Trazodone 50mg q pm  ?  Memory Loss  -patient AOx3 today  -per  on phone, she has been having worse memory over past month  -Family hx of dementia;  may have to discuss functional capacity after OT and PT and patient may need higher care if cognition declining  -Will monitor cognition as it may be decreased due to elevated sugars  ?  DVt prx: Heparin 5000 bid  Abx: none  Fluids: DKa protocol  Diet: npo  ?  Dispo: get sugars down with DKA protocol and close gap, allow patient to eat once gap closes and sugars < 200.  ?  Agree with note as written as above. ?Patient was seen on February 17.? She admitted to the ICU with?HHS.  ?   Problem List/Past Medical History  Ongoing  B-complex deficiency(  Confirmed  )  Degenerative arthritis of knee, bilateral  Degenerative joint disease (DJD) of lumbar spine  Dysphagia(  Confirmed  )  GERD (gastroesophageal reflux disease)  HTN (hypertension)(  Confirmed  )  Hyperlipidemia(  Confirmed  )  Insomnia(  Confirmed  )  Microscopic hematuria  Neuropathy  Obesity  Osteopenia  Risk for falls  Tobacco user  Type 2 diabetes mellitus  Unsteady gait  Vitamin D deficiency(  Confirmed  )  Historical  No qualifying data  Procedure/Surgical History  Cystourethroscopy (separate procedure) (06/26/2018)  Inspection of Bladder, Via Natural or Artificial Opening Endoscopic (06/26/2018)  Supracervical abdominal hysterectomy (subtotal hysterectomy), with or without removal of tube(s), with or without removal of ovary(s)   Medications  Inpatient  aspirin 325 mg oral Delayed Release (EC) tablet, 325 mg= 1 tab(s), Oral, Daily  atorvastatin 20 mg oral tablet, 20 mg= 1 tab(s), Oral, Daily  Dextrose 5% with 0.45% NaCl intravenous solution 1,000 mL, 1000 mL, IV  Dextrose 50% abboject syringe, 12.5 gm= 25 mL, IV Push, q15min, PRN  gabapentin 300 mg oral capsule, 300 mg= 1 cap(s), Oral, BID  IVF Normal Saline NS Bolus 1000ml 1,000 mL, 1000 mL, IV  Magnesium Sulfate 2gm/50ml IVPB (Premix), 2 gm= 50 mL, IV Piggyback, Once, PRN  Milk of Magnesia, 30 mL, Oral, Daily, PRN  Novolin R infusion additive 100 units + Sodium Chloride 0.9% 100ml 100  mL  Pantoprazole 40 mg ORAL EC-Tablet, 40 mg= 1 tab(s), Oral, Daily  Sodium Chloride 0.45% intravenous solution 1,000 mL, 1000 mL, IV  Sodium Chloride 0.9% intravenous solution 1,000 mL, 1000 mL, IV  Sodium Chloride 0.9% intravenous solution 1,000 mL, 1000 mL, IV  Sodium Chloride 0.9% intravenous solution 1,000 mL, 1000 mL, IV  sodium phosphate (for IVPB) + Sodium Chloride 0.9% 250ml 250 mL  traZODONE 50 mg oral tablet ( Desyrel ), 50 mg= 1 tab(s), Oral, Once a day (at bedtime)  Tylenol, 650 mg= 2 tab(s), Oral, q4hr, PRN  Tylenol, 650 mg= 2 tab(s), Oral, q4hr, PRN  Zofran, 4 mg= 2 mL, IV Push, q4hr, PRN  Zofran, 8 mg= 4 mL, IV Push, q4hr, PRN  Home  amlodipine 10 mg oral tablet, 10 mg= 1 tab(s), Oral, Daily, 3 refills  aspirin 81 mg oral Delayed Release (EC) tablet, See Instructions, 2 refills  Centrum Silver oral tablet  chlorthalidone 25 mg oral tablet, 25 mg= 1 tab(s), Oral, Daily, 1 refills  cholecalciferol 2000 intl units (50 mcg) oral capsule, 2000 IntUnit= 1 cap(s), Oral, Daily, 1 refills  contour test strips, See Instructions, 5 refills  Cymbalta 60 mg oral delayed release capsule, 60 mg= 1 cap(s), Oral, Daily, 3 refills  Diabetic test strips, See Instructions  FeroSul 325 mg (65 mg elemental iron) oral tablet, 325 mg= 1 tab(s), Oral, Daily  gabapentin 300 mg oral capsule, 300 mg= 1 cap(s), Oral, BID, 5 refills  Glucometer, See Instructions  losartan 50 mg oral tablet, 50 mg= 1 tab(s), Oral, BID, 3 refills  metFORMIN 1000 mg oral tablet, See Instructions, 5 refills  metoprolol tartrate 50 mg oral tab, 50 mg= 1 tab(s), Oral, BID, 5 refills  NovoLOG Mix 70/30 subcutaneous suspension, 26 units, Subcutaneous, BID, 11 refills  omeprazole 40 mg oral DR capsule, 40 mg= 1 cap(s), Oral, Daily, 3 refills  pravastatin 20 mg oral tablet, 20 mg= 1 tab(s), Oral, qPM, 1 refills  Rollator, See Instructions  traZODONE 50 mg oral tablet ( Desyrel ), 50 mg= 1 tab(s), Oral, Once a day (at bedtime), 2  refills  Allergies  lisinopril?(swelling (tongue/lips))  Family History  CAD (coronary artery disease): Brother.  Cancer of gallbladder: Father.  Diabetes mellitus type 2: Sister and Brother.  Hypertension.: Sister and Brother.  Stroke: Negative: Mother.  Immunizations  Vaccine Date Status Comments   influenza virus vaccine, inactivated 11/14/2021 Given Early/Late Reason:  Nursing Judgment   zoster vaccine, inactivated 09/09/2021 Given    COVID-19 MRNA, LNP-S, PF- Pfizer 04/28/2021 Recorded    COVID-19 MRNA, LNP-S, PF- Pfizer 04/07/2021 Recorded    zoster vaccine, inactivated 09/24/2020 Given    influenza virus vaccine, inactivated 11/01/2018 Given    influenza virus vaccine, inactivated 11/16/2017 Given    influenza virus vaccine, inactivated 09/29/2016 Given    influenza virus vaccine, inactivated 12/17/2015 Given    pneumococcal 13-valent conjugate vaccine 12/17/2015 Given    influenza virus vaccine, inactivated 12/01/2014 Recorded    influenza virus vaccine, inactivated 09/20/2012 Recorded    diphtheria/pertussis, acellular/tetanus 03/2012 Recorded    tetanus/diphtheria/pertussis, acel(Tdap) 03/01/2012 Recorded    influenza virus vaccine, inactivated 12/09/2010 Recorded    pneumococcal 23-polyvalent vaccine 11/2006 Recorded

## 2022-05-23 RX ORDER — TRAZODONE HYDROCHLORIDE 50 MG/1
50 TABLET ORAL NIGHTLY
COMMUNITY
Start: 2022-03-09 | End: 2022-05-23 | Stop reason: SDUPTHER

## 2022-05-23 RX ORDER — PEN NEEDLE, DIABETIC 31 GX5/16"
NEEDLE, DISPOSABLE MISCELLANEOUS
COMMUNITY
Start: 2022-02-21 | End: 2022-05-23 | Stop reason: SDUPTHER

## 2022-05-23 RX ORDER — TRAZODONE HYDROCHLORIDE 50 MG/1
50 TABLET ORAL NIGHTLY
Qty: 90 TABLET | Refills: 1 | Status: SHIPPED | OUTPATIENT
Start: 2022-05-23 | End: 2022-06-30

## 2022-05-23 RX ORDER — PEN NEEDLE, DIABETIC 31 GX5/16"
NEEDLE, DISPOSABLE MISCELLANEOUS
Qty: 100 EACH | Refills: 3 | Status: SHIPPED | OUTPATIENT
Start: 2022-05-23 | End: 2022-05-25 | Stop reason: SDUPTHER

## 2022-05-25 ENCOUNTER — OFFICE VISIT (OUTPATIENT)
Dept: FAMILY MEDICINE | Facility: CLINIC | Age: 87
End: 2022-05-25
Payer: MEDICARE

## 2022-05-25 VITALS
HEIGHT: 62 IN | SYSTOLIC BLOOD PRESSURE: 162 MMHG | WEIGHT: 175.69 LBS | OXYGEN SATURATION: 100 % | BODY MASS INDEX: 32.33 KG/M2 | DIASTOLIC BLOOD PRESSURE: 84 MMHG | RESPIRATION RATE: 22 BRPM | TEMPERATURE: 99 F | HEART RATE: 80 BPM

## 2022-05-25 DIAGNOSIS — I10 BENIGN HYPERTENSION: ICD-10-CM

## 2022-05-25 DIAGNOSIS — E11.9 DIABETES MELLITUS WITHOUT COMPLICATION: Primary | ICD-10-CM

## 2022-05-25 DIAGNOSIS — R13.10 DYSPHAGIA, UNSPECIFIED TYPE: ICD-10-CM

## 2022-05-25 DIAGNOSIS — R31.29 MICROSCOPIC HEMATURIA: ICD-10-CM

## 2022-05-25 LAB — HBA1C MFR BLD: 12 %

## 2022-05-25 PROCEDURE — 99214 OFFICE O/P EST MOD 30 MIN: CPT | Mod: PBBFAC | Performed by: FAMILY MEDICINE

## 2022-05-25 PROCEDURE — 83036 HEMOGLOBIN GLYCOSYLATED A1C: CPT | Mod: PBBFAC | Performed by: FAMILY MEDICINE

## 2022-05-25 RX ORDER — PEN NEEDLE, DIABETIC 31 GX5/16"
NEEDLE, DISPOSABLE MISCELLANEOUS
Qty: 100 EACH | Refills: 3 | Status: SHIPPED | OUTPATIENT
Start: 2022-05-25 | End: 2022-10-12 | Stop reason: SDUPTHER

## 2022-05-25 NOTE — PROGRESS NOTES
"Subjective:        Chief Complaint: Diabetes, followup hosptial discharge, and c/o bilateral leg pain    HPI    Michelle Saenz is a 89 y.o. female here for FU DM, uncontrolled following recent hosp stay  See luz marina 4/12/2022 for detailed h/o    Started Aricept 4/2022. LDCT ordered  Home CBG < 200,  giving 30 AM, 26 PM, used to use vials, has been on pens for last 5-6 mos ago  mammo  -still considering   CBC CMP TSH  Stable,  a1c 11 3/2022    Admitted [facility] x  4 days,started with ?syncope, shakes after missed an insulin shot the night before  Had not been checking home CBG prior to this because she had been doing "good" - no records at this time  Since home doing well, will need to review records   is primary caregiver has help from family  Pt has no c/o for me today except ongoing BL knee and leg pain      PMH  DM  HTN/ HLD EKG 5/2021 stable, Carotids clear 4.2018  GERD  MAGDY  Depression/Insomnia  Dysphagia - mild-mod 3/2021 esophagram  Osteopenia hip T -2.1  BL knee pain: Moderate DJD per 9/2021 knee x-rays,  Chronic Tobacco use - have counciled pt and family on safety risks of this    Health Maintenance   Mammo-: 7/2019  DEXA: 4/2019, 9/2021-stable, treatment declined  FIT 8/2020 neg  PAP no recent, had supracervical hyst      Advance directives  Code:-Full, declined prolonged life support, does not want feeding tube  HC POA: First-Scotty Saenz,   Second-nephew Lee Kaufman  Has been and niece who is present were made aware of these existing directives    Review of Systems     Respiratory: no cough, wheezing, SOB  Cardiovascular: no CP, palpitations  Gastrointestinal: no NVD, ABD pain, blood in stool, melena  Genitourinary: no dysuria, frequency, urgency, hematuria        Objective:      Physical Exam    General - NAD, comfortable, sitting in wheelchair, alert, answers simple questions easily and correctly  Neck - no node, mass ,thyromegaly  Respiratory - CTA BL, no " distress  Cardiovascular - RRR  Gastrointestinal - soft NT,nl BS x 4       Assessment:       1. Diabetes mellitus without complication  POCT HEMOGLOBIN A1C   2. Benign hypertension     3. Microscopic hematuria  Urinalysis, Reflex to Urine Culture Urine, Clean Catch   4. Dysphagia, unspecified type             Plan:        to provide list of current meds, needs home PT  Call for [employer] records  LDCT as raimundo  Mammo to be done 6/2022  Keep 6/30 /2022 reg FU

## 2022-06-08 ENCOUNTER — DOCUMENTATION ONLY (OUTPATIENT)
Dept: FAMILY MEDICINE | Facility: CLINIC | Age: 87
End: 2022-06-08
Payer: MEDICARE

## 2022-06-08 NOTE — PROGRESS NOTES
5/2022 [facility] records reviewed  Admitted with Syncope vs TIA  Creat 1.4 TSH nl H/H nl   LDL 98  Renal US -9-10cm kidneys  CUS >50% BL, ratio 0.6, no increased velocities  ECHO EF 60% neg bubble  EKG incomplete left bundel  CXR clear  CT Brain age related changes  MRI Brain small vessel dis

## 2022-06-20 PROBLEM — E78.5 HYPERLIPIDEMIA: Status: ACTIVE | Noted: 2022-06-20

## 2022-06-20 PROBLEM — R13.10 DYSPHAGIA: Status: ACTIVE | Noted: 2022-06-20

## 2022-06-20 PROBLEM — M85.80 OSTEOPENIA: Status: ACTIVE | Noted: 2022-06-20

## 2022-06-20 PROBLEM — M17.0 OSTEOARTHRITIS OF BOTH KNEES: Status: ACTIVE | Noted: 2022-06-20

## 2022-06-20 PROBLEM — G47.00 INSOMNIA: Status: ACTIVE | Noted: 2022-06-20

## 2022-06-20 PROBLEM — K21.9 GASTROESOPHAGEAL REFLUX DISEASE: Status: ACTIVE | Noted: 2022-06-20

## 2022-06-20 PROBLEM — M47.816 SPONDYLOSIS OF LUMBAR SPINE: Status: ACTIVE | Noted: 2022-06-20

## 2022-06-20 PROBLEM — E11.9 TYPE 2 DIABETES MELLITUS: Status: ACTIVE | Noted: 2022-06-20

## 2022-06-20 PROBLEM — E55.9 VITAMIN D DEFICIENCY: Status: ACTIVE | Noted: 2022-06-20

## 2022-06-20 PROBLEM — I10 HYPERTENSION: Status: ACTIVE | Noted: 2022-06-20

## 2022-06-20 RX ORDER — LANCETS 33 GAUGE
EACH MISCELLANEOUS
COMMUNITY
Start: 2022-01-10 | End: 2022-10-12 | Stop reason: SDUPTHER

## 2022-06-20 RX ORDER — BLOOD-GLUCOSE METER
EACH MISCELLANEOUS
COMMUNITY
Start: 2022-01-10

## 2022-06-20 RX ORDER — OMEPRAZOLE 40 MG/1
40 CAPSULE, DELAYED RELEASE ORAL DAILY
COMMUNITY
Start: 2022-04-12 | End: 2022-11-29 | Stop reason: SDUPTHER

## 2022-06-20 RX ORDER — METOPROLOL TARTRATE 50 MG/1
50 TABLET ORAL 2 TIMES DAILY
COMMUNITY
Start: 2022-04-12 | End: 2022-11-29 | Stop reason: SDUPTHER

## 2022-06-20 RX ORDER — CALCIUM CITRATE/VITAMIN D3 200MG-6.25
TABLET ORAL
COMMUNITY
Start: 2022-01-10 | End: 2022-10-07 | Stop reason: SDUPTHER

## 2022-06-20 RX ORDER — INSULIN GLARGINE 100 [IU]/ML
INJECTION, SOLUTION SUBCUTANEOUS
COMMUNITY
Start: 2022-05-20 | End: 2022-06-30 | Stop reason: SDUPTHER

## 2022-06-20 RX ORDER — GABAPENTIN 300 MG/1
300 CAPSULE ORAL 2 TIMES DAILY
COMMUNITY
Start: 2022-05-19 | End: 2022-11-04

## 2022-06-20 RX ORDER — PRAVASTATIN SODIUM 20 MG/1
TABLET ORAL
COMMUNITY
Start: 2022-05-13 | End: 2022-12-21 | Stop reason: SDUPTHER

## 2022-06-23 ENCOUNTER — TELEPHONE (OUTPATIENT)
Dept: FAMILY MEDICINE | Facility: CLINIC | Age: 87
End: 2022-06-23
Payer: MEDICARE

## 2022-06-23 ENCOUNTER — DOCUMENTATION ONLY (OUTPATIENT)
Dept: FAMILY MEDICINE | Facility: CLINIC | Age: 87
End: 2022-06-23
Payer: MEDICARE

## 2022-06-24 RX ORDER — FLASH GLUCOSE SCANNING READER
EACH MISCELLANEOUS
Qty: 1 EACH | Refills: 5 | Status: SHIPPED | OUTPATIENT
Start: 2022-06-24 | End: 2022-11-04

## 2022-06-24 RX ORDER — FLASH GLUCOSE SENSOR
KIT MISCELLANEOUS
Qty: 1 KIT | Refills: 5 | Status: SHIPPED | OUTPATIENT
Start: 2022-06-24 | End: 2022-11-04

## 2022-06-24 NOTE — TELEPHONE ENCOUNTER
Please call [facility] pharmacy and see if they dispense the free style brina for patients

## 2022-06-24 NOTE — TELEPHONE ENCOUNTER
Please let  know I sent order to [facility] pharmacy but he has to check with them to se if it is covered on her insurance      Alyssa Colon RN  You 8 hours ago (9:18 AM)     VG    [facility] does dispense the Free Style Elder Glucose Monitor. A PA is needing if going through insurance company or it can be purchase with cash payments. Thanks    Message text       You routed conversation to Alyssa Colon RN

## 2022-06-24 NOTE — PROGRESS NOTES
Spoke with  about CBG (a1c 12 5/2022) reports she is still reluctant to check CBG but occ she lets him and he gets anywhere form 70 -210 but rarely >200, reports overall they eat lighter these days but thinks she is gaining weight, HH will be signing off soon butt hey have reported trouble with consistent CBG logging  Only uses Insulin BID but perhaps would qualify for freestyle brina, will try to look  Into it

## 2022-06-27 NOTE — TELEPHONE ENCOUNTER
Spoke to the  and informed him to call [facility] pharmacy to see if patient insurance will cover the Elder Free Style Glucose Monitor. He stated he will call them and that his nephew picks up her insulin and he will pick it up. Thanks

## 2022-06-30 ENCOUNTER — TELEPHONE (OUTPATIENT)
Dept: FAMILY MEDICINE | Facility: CLINIC | Age: 87
End: 2022-06-30
Payer: MEDICARE

## 2022-06-30 ENCOUNTER — OFFICE VISIT (OUTPATIENT)
Dept: FAMILY MEDICINE | Facility: CLINIC | Age: 87
End: 2022-06-30
Payer: MEDICARE

## 2022-06-30 ENCOUNTER — TELEPHONE (OUTPATIENT)
Dept: FAMILY MEDICINE | Facility: CLINIC | Age: 87
End: 2022-06-30

## 2022-06-30 VITALS
DIASTOLIC BLOOD PRESSURE: 74 MMHG | HEART RATE: 69 BPM | BODY MASS INDEX: 33.68 KG/M2 | TEMPERATURE: 99 F | RESPIRATION RATE: 20 BRPM | OXYGEN SATURATION: 98 % | WEIGHT: 178.38 LBS | SYSTOLIC BLOOD PRESSURE: 140 MMHG | HEIGHT: 61 IN

## 2022-06-30 DIAGNOSIS — R82.90 ABNORMAL URINE ODOR: ICD-10-CM

## 2022-06-30 DIAGNOSIS — F02.80 DEMENTIA ASSOCIATED WITH OTHER UNDERLYING DISEASE WITHOUT BEHAVIORAL DISTURBANCE: ICD-10-CM

## 2022-06-30 DIAGNOSIS — M17.12 PRIMARY OSTEOARTHRITIS OF LEFT KNEE: Primary | ICD-10-CM

## 2022-06-30 DIAGNOSIS — I10 HYPERTENSION, UNSPECIFIED TYPE: ICD-10-CM

## 2022-06-30 DIAGNOSIS — E11.9 TYPE 2 DIABETES MELLITUS WITHOUT COMPLICATION, WITH LONG-TERM CURRENT USE OF INSULIN: ICD-10-CM

## 2022-06-30 DIAGNOSIS — M17.0 OSTEOARTHRITIS OF BOTH KNEES, UNSPECIFIED OSTEOARTHRITIS TYPE: ICD-10-CM

## 2022-06-30 DIAGNOSIS — Z79.4 TYPE 2 DIABETES MELLITUS WITHOUT COMPLICATION, WITH LONG-TERM CURRENT USE OF INSULIN: ICD-10-CM

## 2022-06-30 PROBLEM — D50.9 IDA (IRON DEFICIENCY ANEMIA): Status: ACTIVE | Noted: 2022-06-30

## 2022-06-30 PROBLEM — F32.A DEPRESSION: Status: ACTIVE | Noted: 2022-06-30

## 2022-06-30 PROBLEM — F03.90 DEMENTIA: Status: ACTIVE | Noted: 2022-06-30

## 2022-06-30 PROCEDURE — 99215 OFFICE O/P EST HI 40 MIN: CPT | Mod: PBBFAC

## 2022-06-30 RX ORDER — DULOXETIN HYDROCHLORIDE 60 MG/1
60 CAPSULE, DELAYED RELEASE ORAL DAILY
COMMUNITY
End: 2022-11-04

## 2022-06-30 RX ORDER — MEMANTINE HYDROCHLORIDE 5 MG/1
5 TABLET ORAL 2 TIMES DAILY
Qty: 60 TABLET | Refills: 2 | Status: SHIPPED | OUTPATIENT
Start: 2022-06-30 | End: 2022-12-21 | Stop reason: SDUPTHER

## 2022-06-30 RX ORDER — INSULIN GLARGINE 100 [IU]/ML
INJECTION, SOLUTION SUBCUTANEOUS
Qty: 15 ML | Refills: 5 | Status: SHIPPED | OUTPATIENT
Start: 2022-06-30 | End: 2022-07-22

## 2022-06-30 RX ORDER — MEMANTINE HYDROCHLORIDE 5 MG/1
5 TABLET ORAL 2 TIMES DAILY
COMMUNITY
End: 2022-06-30 | Stop reason: SDUPTHER

## 2022-06-30 RX ORDER — TRAZODONE HYDROCHLORIDE 50 MG/1
50 TABLET ORAL NIGHTLY
Qty: 90 TABLET | Refills: 1
Start: 2022-06-30 | End: 2022-12-21 | Stop reason: SDUPTHER

## 2022-06-30 RX ORDER — CEFDINIR 300 MG/1
300 CAPSULE ORAL 2 TIMES DAILY
Qty: 20 CAPSULE | Refills: 0 | Status: SHIPPED | OUTPATIENT
Start: 2022-06-30 | End: 2022-11-04

## 2022-06-30 NOTE — TELEPHONE ENCOUNTER
Please call [facility] and see if they can fill her freestyle brina that we ordered

## 2022-06-30 NOTE — TELEPHONE ENCOUNTER
Dr Garcia, I called the HH and gave the order for aide to visit home for bathing.  She informed me that the patient was coming up for recert.

## 2022-06-30 NOTE — PROGRESS NOTES
Subjective:       Patient ID: Michelle Saenz is a 89 y.o. female.    Chief Complaint: Diabetes, Hypertension, and Insomnia (Weakness to lower legs)    HPI   Presents to [facility] in wheelchair with   Answers most questions for herself, answers are not always correct,  corroborates  Walks around home with walker, knee pain  limits her  5.2022 labs, scans from admit [facility] reviewed and sent for scan    DM: AM fasting < 120, no PP or afternoon sugars, no hypoglycemia,  gives insulin and check CBG  HTN: stable  H/o dysphagia: doing ok, no issues, gained few pounds  Dementia: started Aricept 5 3/2022 but  unsure if she tolerated it, fell ill about 6 weeks later and ended up in [facility], repeats self constantly, forgetful, forgets the day, asks same question often through out the day, still recognizes friends,family, tried to tend to own ADLs but needs assist with all  C/o: Urinary freq with odor, no dysuria, HH was unable to collect urine, tried to go here but unable to  BL knee DJD: XR 9/2021, ortho referral placed, pt unsure if got called, will replace  MAGDY: FIT neg 2020, no prior EGD/colon, no weight loss or GI s/s, will monitor and consider but not strong candidate due to age and comorbidities, cont iron    Health Maintenance  Mammo: 7/2019, considering repeat  DEXA: 4/2019, 9/2021-stable, treatment declined  FIT 8/2020 neg  PAP no recent, had supracervical hyst  LDCT: insurance declined due to age 2/2022 CXR ok    Care Team  None  CARD: no prior SCHREIBER, EKG 2/2022      Advance directives  Code:-Full, declined prolonged life support, does not want feeding tube  HC POA: First-Scotty Saenz,   Second-nephew Lee Kaufman  Has been and niece who is present were made aware of these existing directives      PMH  DM  HTN  HLD EKG 5/2021 stable, Carotids clear 4.2018  GERD  MAGDY  Depression/Insomnia  Dysphagia:  mild-mod 3/2021 esophagram  Osteopenia: hip T -2.1 9/2021, declined  treatment  BL knee pain: Moderate DJD per 9/2021 knee x-rays    Tobacco use: Continues to smoke 1.5 packs/day, does not want to quit, concerned about unsupervised smoking, family aware that she should be monitored at all times,  does not smoke in bed but does smoke in the chair in her bedroom    Cognitive decline/Mild Dementia: Repeats self daily, still recognizes all family members, forgetful for short-term events, day equal to night, distant memory intact  Does not cook or drive, no aggressive or resistive behavior, is very well kept, manages most ADLs, standby assistance for dressing, toileting and brushing teeth independent  Remains continent of bowels and bladder, Home health signed off at this time  Risk/benefits/side effects of treatments for dementia discussed-although at this advanced age when to stand there may be limited benefit  however the family wishes a trial of medication if it is able to preserve some of the patient's independence      Review of Systems   Constitutional: no fever,  weakness  Respiratory: no cough, wheezing, SOB  Cardiovascular: no CP, palpitations, edema  Gastrointestinal: no NVD, ABD pain, blood in stool, melena        Objective:      Physical Exam    Vitals:    06/30/22 0952   BP: (!) 140/74   Pulse: 69   Resp: 20   Temp: 98.8 °F (37.1 °C)       General - NAD, comfortable, alert, well kept  HEENT- nl TM,EAC, kaylah w/o redness  Neck - no node, mass ,thyromegaly  Respiratory - CTA BL, no distress  Cardiovascular - RRR, 1-2 TABITHA w/o rad RUSB, no bruit, trace BL LE edema  Gastrointestinal - soft NT, no mass, nl BS x 4   Neurologic - no focal deficits    Assessment:       1. Primary osteoarthritis of left knee  Ambulatory referral/consult to Orthopedics   2. Abnormal urine odor  CANCELED: Urinalysis, Reflex to Urine Culture Urine, Clean Catch   3. Type 2 diabetes mellitus without complication, with long-term current use of insulin     4. Hypertension, unspecified type     5.  Osteoarthritis of both knees, unspecified osteoarthritis type     6. Dementia associated with other underlying disease without behavioral disturbance             Plan:       UC, UA couldn't void here or with HH, will treat empirically with Omnicef   Start Namenda 5 BID, disc with  that there may not be much to gain at this stage and age but he wants to try if there is a chance to preserve independence little longer  reconsult HH for aid  Ortho consult  Call if CBG > 200, trying to get freestyle brina  OV 50 minutes, abstracting and/or reviewing chart, discussing medical issues and documentation  RTC 2 mos katerian dementia and DM

## 2022-07-01 NOTE — TELEPHONE ENCOUNTER
The pharmacy will fill the order for the Free Style Elder for the patient. I spoke to her  to let him know and he informed me that the nephew will pick it up. I instructed him to call the clinic if he needs training on the usage of the monitor and he will be scheduled in the Diabetic Clinic. Thanks

## 2022-07-21 ENCOUNTER — TELEPHONE (OUTPATIENT)
Dept: FAMILY MEDICINE | Facility: CLINIC | Age: 87
End: 2022-07-21
Payer: MEDICARE

## 2022-07-21 NOTE — TELEPHONE ENCOUNTER
Brie Hernandez with [facility] called and stated that the patient had a FBS of 259 this am and a random BS of 313.  She stated that the patient was taken off her Metformin.  She is asking that you please call her at [phone].  Please advise.

## 2022-07-22 RX ORDER — INSULIN GLARGINE 100 [IU]/ML
INJECTION, SOLUTION SUBCUTANEOUS
Qty: 15 ML | Refills: 5
Start: 2022-07-22 | End: 2022-08-04

## 2022-07-22 NOTE — TELEPHONE ENCOUNTER
Spoke with Brie, will increase AM insulin form 30 to 36 units and keep PM at 26, they will report in 1-2 weeks  Med list updated

## 2022-07-25 ENCOUNTER — TELEPHONE (OUTPATIENT)
Dept: FAMILY MEDICINE | Facility: CLINIC | Age: 87
End: 2022-07-25
Payer: MEDICARE

## 2022-07-25 NOTE — TELEPHONE ENCOUNTER
Please call [facility] and ask if they attempted to lore la rx for 70/30, she is not on this according to her medical records   1

## 2022-07-26 NOTE — TELEPHONE ENCOUNTER
Dr Garcia, I spoke to Erich in the pharmacy and 70/30 is not on the patients profile just the Lantus.

## 2022-07-27 ENCOUNTER — TELEPHONE (OUTPATIENT)
Dept: FAMILY MEDICINE | Facility: CLINIC | Age: 87
End: 2022-07-27
Payer: MEDICARE

## 2022-08-03 ENCOUNTER — HOSPITAL ENCOUNTER (OUTPATIENT)
Dept: RADIOLOGY | Facility: HOSPITAL | Age: 87
Discharge: HOME | End: 2022-08-03
Attending: STUDENT IN AN ORGANIZED HEALTH CARE EDUCATION/TRAINING PROGRAM
Payer: MEDICARE

## 2022-08-03 ENCOUNTER — OFFICE VISIT (OUTPATIENT)
Dept: ORTHOPEDICS | Facility: CLINIC | Age: 87
End: 2022-08-03
Payer: MEDICARE

## 2022-08-03 VITALS
BODY MASS INDEX: 33.61 KG/M2 | WEIGHT: 178 LBS | DIASTOLIC BLOOD PRESSURE: 85 MMHG | HEIGHT: 61 IN | HEART RATE: 69 BPM | SYSTOLIC BLOOD PRESSURE: 162 MMHG

## 2022-08-03 DIAGNOSIS — E08.65 DIABETES MELLITUS DUE TO UNDERLYING CONDITION, UNCONTROLLED, WITH HYPERGLYCEMIA: ICD-10-CM

## 2022-08-03 DIAGNOSIS — M25.562 PAIN IN BOTH KNEES, UNSPECIFIED CHRONICITY: ICD-10-CM

## 2022-08-03 DIAGNOSIS — M25.561 PAIN IN BOTH KNEES, UNSPECIFIED CHRONICITY: ICD-10-CM

## 2022-08-03 DIAGNOSIS — I10 PRIMARY HYPERTENSION: ICD-10-CM

## 2022-08-03 DIAGNOSIS — M17.12 PRIMARY OSTEOARTHRITIS OF LEFT KNEE: Primary | ICD-10-CM

## 2022-08-03 DIAGNOSIS — E66.9 OBESITY (BMI 30-39.9): ICD-10-CM

## 2022-08-03 DIAGNOSIS — M17.11 PRIMARY OSTEOARTHRITIS OF RIGHT KNEE: ICD-10-CM

## 2022-08-03 PROCEDURE — 99215 OFFICE O/P EST HI 40 MIN: CPT | Mod: PBBFAC | Performed by: STUDENT IN AN ORGANIZED HEALTH CARE EDUCATION/TRAINING PROGRAM

## 2022-08-03 PROCEDURE — 73564 X-RAY EXAM KNEE 4 OR MORE: CPT | Mod: TC,LT

## 2022-08-03 PROCEDURE — 73564 X-RAY EXAM KNEE 4 OR MORE: CPT | Mod: TC,RT

## 2022-08-03 RX ORDER — DICLOFENAC SODIUM 10 MG/G
2 GEL TOPICAL 4 TIMES DAILY PRN
Qty: 100 G | Refills: 0 | Status: SHIPPED | OUTPATIENT
Start: 2022-08-03 | End: 2022-11-04

## 2022-08-03 RX ORDER — MELOXICAM 15 MG/1
15 TABLET ORAL DAILY
Qty: 5 TABLET | Refills: 0 | Status: SHIPPED | OUTPATIENT
Start: 2022-08-03 | End: 2022-08-08

## 2022-08-03 NOTE — PROGRESS NOTES
"Subjective:    Patient ID: Michelle Saenz is a 89 y.o. female  who presented to [facility] for new visit..      Chief Complaint: Pain of the Right Knee and Pain of the Left Knee      History of Present Illness:  HPI     Michelle Saenz w/ history of DJD of knees for years presents with  referred for pcp for possible csi injection. A1c recently 2 months ago was 12. Gets PT at HH 1x a week for the past 6 weeks ago. She had a fall recently without injuuring her knees. Fall was mechanical. Evaluation to date: imaging with xr-ays showing kl 3 grade oa. Havents haded CSI to knees before. Doing therapy 1x a week. No surgery. No voltaren gel. Has been using icy hot. Takes tylenol with some relief. Not taking nsaids. No apparent contraindications to nsaids per patient and . Expectations for today's visit includes CSI injection and pain relief.  Occupation includes domestic work. Retired now since 2005. Uses [employer].    Knee Review of Systems:  Swelling?  yes  Instability?  no  Mechanical sx?  no  <30 min AM stiffness? no  Limited ROM? no  Fever/Chills? no    ROS       Objective:      Physical Exam:    BP (!) 162/85   Pulse 69   Ht 5' 1" (1.549 m)   Wt 80.7 kg (178 lb)   BMI 33.63 kg/m²     Ortho/SPM Exam    Appearance:  Normal gait/station  FWB  Alignment: Left: normal Right: normal   Soft tissue swelling: Left: yes Right: yes  Effusion: Left:  Negative Right: Negative  Erythema: Left no Right: no  Ecchymosis: Left: no Right: no  Atrophy: Left: no Right: no    Palpation:  Knee Tenderness: Left: general nonspecific tenderness anteriorly Right: general nonspecific tenderness anteriorly    Range of motion:  Flexion: Left:  120 Right: 130  Extension: Left:30 Right: 40    Strength:  deferred      Special Tests:  Ballotable Effusion:Left: Negative Right: Negative   Fluid Wave: Left: Negative Right: Negative   Crepitus: Left: Negative Right: Negative "         General appearance: NAD  Peripheral pulses: normal bilaterally   Reflexes: Left: normal Right normal   Sensation: normal    Labs:  Last A1c: 12.0   Reviewed bmp labs    Imaging:   Previous images reviewed.  X-rays ordered and performed today: yes  # of views: 4 Laterality: bilateral  My Interpretation:  shows DJD changes, likely chronic  Kl grade 4.    Assessment:        Encounter Diagnoses   Name Primary?    Primary osteoarthritis of left knee Yes    Pain in both knees, unspecified chronicity     Primary osteoarthritis of right knee     Diabetes mellitus due to underlying condition, uncontrolled, with hyperglycemia     Obesity (BMI 30-39.9)     Primary hypertension         Plan:       Dx: bilateral Knee Osteoarthritis. Acute in moderate exacerbation.   Treatment Plan: Discussed with patient diagnosis, prognosis, and treatment recommendations. Education provided.  Home physical therapy exercise handouts provided to patient. , RX NSAID - see med list, topical hot or cold therapy, Over the counter NSAID and/or tylenol provided you do not have contraindications such as but not limited to liver or kidney disease or uncontrolled blood pressure. If you're doctors have told you to to not take them based on your health, do not take them. , Using a brace provided to you here or from your local pharmacy or durable medical equipment (DME) store. , oral glucosamine 1500 mg/day. and topical capsaicin as needed continue wearing compressive braces. Script for compressive braces given to patient. May consider medial offloaders.  Imaging: radiological studies ordered and independently reviewed; discussed with patient; pending radiologist interpretation.   Weight Management: is paramount. recommend at least 10% of total body weight loss if your bmi is 30-34.9. A bmi 24.9 or less may provide further relief..   Procedure: Discussed CSI/VSI as treatment options; discussed CSI vs VS injections as treatment options in future  if conservative measures do not improve symptoms. A1c too high for injection.  Activity: Activity as tolerated; HEP to include aerobic conditioning and strength training with non-painful activity. ROM/STG exercises. Proper footware; assistive devises to avoid limping.   Therapy: Physical Therapy  Medication: first line treatment with daily acetaminophen. Up to 1000 mg three times daily can be taken; medication precautions given., topical NSAIDs prescribed; medication precautions given and start OTC NSAIDs; medication precautions. Please see your primary care physician for further refills.  RTC: PRN; call if any issues.         Procedures

## 2022-08-04 ENCOUNTER — TELEPHONE (OUTPATIENT)
Dept: FAMILY MEDICINE | Facility: CLINIC | Age: 87
End: 2022-08-04
Payer: MEDICARE

## 2022-08-04 RX ORDER — INSULIN GLARGINE 100 [IU]/ML
INJECTION, SOLUTION SUBCUTANEOUS
Qty: 15 ML | Refills: 5
Start: 2022-08-04 | End: 2022-10-12 | Stop reason: SDUPTHER

## 2022-08-04 RX ORDER — METFORMIN HYDROCHLORIDE 500 MG/1
500 TABLET, EXTENDED RELEASE ORAL 2 TIMES DAILY WITH MEALS
Qty: 180 TABLET | Refills: 1 | Status: SHIPPED | OUTPATIENT
Start: 2022-08-04 | End: 2023-03-13 | Stop reason: SDUPTHER

## 2022-08-04 NOTE — TELEPHONE ENCOUNTER
CBG still 200-300 per , increase 36 am, 26 pm to 40 am and 30 pm and restart metformin 500 Bid - did well before per husbnad

## 2022-08-05 RX ORDER — CEFDINIR 300 MG/1
300 CAPSULE ORAL 2 TIMES DAILY
Qty: 20 CAPSULE | Refills: 0 | OUTPATIENT
Start: 2022-08-05

## 2022-08-12 ENCOUNTER — TELEPHONE (OUTPATIENT)
Dept: FAMILY MEDICINE | Facility: CLINIC | Age: 87
End: 2022-08-12
Payer: MEDICARE

## 2022-08-12 NOTE — TELEPHONE ENCOUNTER
Pt's niece, Rosina Martinez (Lee Kaufman's sister) called, #[phone], would like to know process of getting wheelchair for pt. States pt has had several falls.

## 2022-10-07 RX ORDER — CALCIUM CITRATE/VITAMIN D3 200MG-6.25
TABLET ORAL
Qty: 100 EACH | Refills: 5 | Status: SHIPPED | OUTPATIENT
Start: 2022-10-07 | End: 2022-11-04

## 2022-10-12 RX ORDER — INSULIN GLARGINE 100 [IU]/ML
INJECTION, SOLUTION SUBCUTANEOUS
Qty: 15 ML | Refills: 5 | Status: SHIPPED | OUTPATIENT
Start: 2022-10-12 | End: 2022-11-04

## 2022-10-12 RX ORDER — LANCETS 33 GAUGE
EACH MISCELLANEOUS
Qty: 100 EACH | Refills: 5 | Status: SHIPPED | OUTPATIENT
Start: 2022-10-12 | End: 2022-12-04

## 2022-10-12 RX ORDER — PEN NEEDLE, DIABETIC 30 GX3/16"
NEEDLE, DISPOSABLE MISCELLANEOUS
Qty: 100 EACH | Refills: 3 | Status: SHIPPED | OUTPATIENT
Start: 2022-10-12 | End: 2023-03-23

## 2022-10-17 ENCOUNTER — TELEPHONE (OUTPATIENT)
Dept: FAMILY MEDICINE | Facility: CLINIC | Age: 87
End: 2022-10-17
Payer: MEDICARE

## 2022-10-18 NOTE — TELEPHONE ENCOUNTER
Please call [facility]  - ask if the nurse who sees her can call me on my cell phone  -urgent please

## 2022-11-04 ENCOUNTER — TELEPHONE (OUTPATIENT)
Dept: FAMILY MEDICINE | Facility: CLINIC | Age: 87
End: 2022-11-04
Payer: MEDICARE

## 2022-11-04 ENCOUNTER — OFFICE VISIT (OUTPATIENT)
Dept: FAMILY MEDICINE | Facility: CLINIC | Age: 87
End: 2022-11-04
Payer: MEDICARE

## 2022-11-04 ENCOUNTER — TELEPHONE (OUTPATIENT)
Dept: FAMILY MEDICINE | Facility: CLINIC | Age: 87
End: 2022-11-04

## 2022-11-04 VITALS
RESPIRATION RATE: 20 BRPM | SYSTOLIC BLOOD PRESSURE: 126 MMHG | HEART RATE: 75 BPM | WEIGHT: 169.75 LBS | DIASTOLIC BLOOD PRESSURE: 72 MMHG | BODY MASS INDEX: 32.05 KG/M2 | HEIGHT: 61 IN | TEMPERATURE: 99 F | OXYGEN SATURATION: 100 %

## 2022-11-04 DIAGNOSIS — Z79.4 TYPE 2 DIABETES MELLITUS WITHOUT COMPLICATION, WITH LONG-TERM CURRENT USE OF INSULIN: Primary | ICD-10-CM

## 2022-11-04 DIAGNOSIS — E11.9 TYPE 2 DIABETES MELLITUS WITHOUT COMPLICATION, WITH LONG-TERM CURRENT USE OF INSULIN: Primary | ICD-10-CM

## 2022-11-04 DIAGNOSIS — F02.B0 MODERATE DEMENTIA ASSOCIATED WITH OTHER UNDERLYING DISEASE, WITHOUT BEHAVIORAL DISTURBANCE, PSYCHOTIC DISTURBANCE, MOOD DISTURBANCE, OR ANXIETY: ICD-10-CM

## 2022-11-04 DIAGNOSIS — Z23 IMMUNIZATION DUE: ICD-10-CM

## 2022-11-04 DIAGNOSIS — D50.8 OTHER IRON DEFICIENCY ANEMIA: ICD-10-CM

## 2022-11-04 DIAGNOSIS — I10 HYPERTENSION, UNSPECIFIED TYPE: ICD-10-CM

## 2022-11-04 PROCEDURE — 99215 OFFICE O/P EST HI 40 MIN: CPT | Mod: PBBFAC | Performed by: FAMILY MEDICINE

## 2022-11-04 PROCEDURE — G0008 ADMIN INFLUENZA VIRUS VAC: HCPCS | Mod: PBBFAC

## 2022-11-04 RX ORDER — INSULIN GLARGINE 100 [IU]/ML
INJECTION, SOLUTION SUBCUTANEOUS
Qty: 15 ML | Refills: 5
Start: 2022-11-04 | End: 2022-12-27 | Stop reason: SDUPTHER

## 2022-11-04 RX ORDER — FERROUS SULFATE 325(65) MG
325 TABLET ORAL
COMMUNITY

## 2022-11-04 RX ORDER — ASPIRIN 81 MG/1
81 TABLET ORAL DAILY
COMMUNITY

## 2022-11-04 NOTE — TELEPHONE ENCOUNTER
Please call her  [facility] and ask if they can Do BMP A1C at next visit, and submit her sugar logs weekly, insulin dose will be 44 units lantus in am 36 units PM, also let them know we are increasing Namenda to 10mg BID

## 2022-11-04 NOTE — TELEPHONE ENCOUNTER
Please call again - I would like CBC also, also I am placing a [facility] Op[facility] referral for her, please process thanks

## 2022-11-04 NOTE — PROGRESS NOTES
Subjective:       Patient ID: Michelle Saenz is a 89 y.o. female.    Chief Complaint: here for DM FU    HPI    Presents with  to review medications, missed ckup last 9/2022  Overall doing well, no c/o  Provides much of her own basic h/o,  has the details  glucometer not working  I looked at it - battery weak, wrote down battery needed and  showed how to fix  Reviewed medications,  has them all except trazodone, getting lantus from [facility], had glucometer and supplies   Uses iron daily and has for years -started on her own,  reports they want to take that daily, they feel better on it    DM no home CBG except what HH gets on thursdays 200-300  HTN: controlled  Dementia: tolerating Namenda well so far added 6/2022 ,still repeats self a lot,no decline  OA knees: saw ortho [facility] 8/2022, couldn't do IA cortisone due to uncontrolled DM    5/2022 [facility] labs, CT head, ECHO and SCHREIBER scanned in under 6/1/2022 upload date  Hb was 11      Health Maintenance  Mammo: 7/2019, pt considering repeat  DEXA: 4/2019, 9/2021-stable, treatment declined  FIT 8/2020 neg  PAP no recent, had supracervical hyst  LDCT: insurance declined due to age,  2/2022 CXR ok     Care Team  None  CARD: no prior SCHREIBER, EKG 2/2022  [facility] ophth: no recent      PMH  DM  HTN  HLD EKG 5/2021 stable, Carotids clear 4.2018  GERD  MAGDY  Depression/Insomnia  Dysphagia:  mild-mod 3/2021 esophagram  Osteopenia: hip T -2.1 9/2021, declined treatment  BL knee pain: Moderate DJD per 9/2021 knee x-rays     Tobacco use: Continues to smoke 1.5 packs/day, does not want to quit, concerned about unsupervised smoking, family aware that she should be monitored at all times,  does not smoke in bed but does smoke in the chair in her bedroom     Cognitive decline/Mild Dementia: Repeats self daily, still recognizes all family members, forgetful for short-term events, day equal to night, distant memory intact  Does not cook or drive, no aggressive or resistive  "behavior, is very well kept, manages most ADLs, standby assistance for dressing, toileting and brushing teeth independent  Remains continent of bowels and bladder  Risk/benefits/side effects of treatments for dementia discussed-although at this advanced age when to stand there may be limited benefit  however the family wished for trial of medication if  able to preserve some of the patient's independence    Advance directives  Code:-Full, declined prolonged life support, does not want feeding tube  HC POA: First-Scotty Saenz,   Second-nephew Lee Kaufman   and niece who were present were made aware of these existing directives    Current Outpatient Medications   Medication Instructions    aspirin (ECOTRIN) 81 mg, Oral, Daily    diclofenac sodium (VOLTAREN) 2 g, Topical (Top), 4 times daily PRN, Do not exceed 32 grams/day: do not to exceed 8 grams/day/single joint of upper extremities; do not to exceed 16 grams/day/single joint of lower extremities.  Please request refill of this medication from your PCP.    ferrous sulfate (FEOSOL) 325 mg, Oral, With breakfast    insulin (LANTUS SOLOSTAR U-100 INSULIN) glargine 100 units/mL SubQ pen Inject 40 units subq Q am, and 36 units q PM    lancets (EASY TOUCH TWIST LANCETS) 33 gauge Misc USE TO CHECK BLOOD SUGAR LEVELS TWICE DAILY DUE TO FLUCTUATING BLOOD SUGARS, Strength: 33 gauge    memantine (NAMENDA) 5 mg, Oral, 2 times daily, For memory    metFORMIN (GLUCOPHAGE-XR) 500 mg, Oral, 2 times daily with meals    metoprolol tartrate (LOPRESSOR) 50 mg, Oral, 2 times daily    omeprazole (PRILOSEC) 40 mg, Oral, Daily    pen needle, diabetic (BD ULTRA-FINE FARIDA PEN NEEDLE) 32 gauge x " Ndle Use  BID  to inject insulin    pravastatin (PRAVACHOL) 20 MG tablet SMARTSI Tablet(s) By Mouth Every Evening    TRUE METRIX GLUCOSE METER Misc USE TO CHECK BLOOD SUGAR LEVELS TWICE DAILY DUE TO FLUCTUATING BLOOD SUGARS    TRUE METRIX GLUCOSE TEST STRIP Strp USE TO CHECK " BLOOD SUGAR LEVELS TWICE DAILY DUE TO FLUCTUATING BLOOD SUGARS       Review of Systems  Constitutional: no fever, fatigue, constantly asks for sleeping pill but sleeps ok per   Respiratory: no cough, wheezing, SOB  Cardiovascular: no CP, palpitations, edema  Gastrointestinal: no NVD, ABD pain, blood in stool, melena  Genitourinary: no dysuria, frequency, urgency, hematuria      Objective:        General - NAD, comfortable, alert, well kept  Respiratory - CTA BL, no distress  Cardiovascular - RRR, no murmur, bruit, edema  Neurologic - no focal deficits, converses easily, pleasant uses rollator at home      Physical Exam    Assessment:       1. Type 2 diabetes mellitus without complication, with long-term current use of insulin    2. Hypertension, unspecified type    3. 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 Immunization due    5. Other iron deficiency anemia            Plan:       BMP CBC A1C with HH  Increase am insulin to 44, cont PM 36,  able to describe well the process for changing the pen needle and shows site of shots  Flu shot today  Will try to stay off Trazadone for now  Will get HH to help get weekly logs  Increase Namenda 5 BID to 10 BID  Refer [facility] ophth  Rtc 2 mos

## 2022-11-04 NOTE — TELEPHONE ENCOUNTER
Please call [facility] pharmacy, I had sent rx for free style brina, did they get it ? Do they do that?

## 2022-11-07 NOTE — TELEPHONE ENCOUNTER
The pharmacy stated that there is a new version of the Free Style Elder II that has different features and they will need another prescription to be sent for it for the patient. Thanks

## 2022-11-09 ENCOUNTER — TELEPHONE (OUTPATIENT)
Dept: FAMILY MEDICINE | Facility: CLINIC | Age: 87
End: 2022-11-09
Payer: MEDICARE

## 2022-11-09 ENCOUNTER — HOSPITAL ENCOUNTER (EMERGENCY)
Facility: HOSPITAL | Age: 87
Discharge: HOME | End: 2022-11-09
Attending: EMERGENCY MEDICINE
Payer: MEDICARE

## 2022-11-09 VITALS
RESPIRATION RATE: 16 BRPM | SYSTOLIC BLOOD PRESSURE: 133 MMHG | HEART RATE: 86 BPM | HEIGHT: 62 IN | TEMPERATURE: 98 F | OXYGEN SATURATION: 99 % | WEIGHT: 160 LBS | BODY MASS INDEX: 29.44 KG/M2 | DIASTOLIC BLOOD PRESSURE: 90 MMHG
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
## 2022-11-09 DIAGNOSIS — M25.562 LEFT KNEE PAIN: Primary | ICD-10-CM

## 2022-11-09 LAB
ANION GAP SERPL CALC-SCNC: 11 MEQ/L
BASOPHILS # BLD AUTO: 0.04 X10(3)/MCL (ref 0–0.2)
BASOPHILS NFR BLD AUTO: 0.3 %
BUN SERPL-MCNC: 18.1 MG/DL (ref 9.8–20.1)
CALCIUM SERPL-MCNC: 9.6 MG/DL (ref 8.4–10.2)
CHLORIDE SERPL-SCNC: 100 MMOL/L (ref 98–111)
CO2 SERPL-SCNC: 28 MMOL/L (ref 23–31)
CREAT SERPL-MCNC: 1.81 MG/DL (ref 0.55–1.02)
CREAT/UREA NIT SERPL: 10
EOSINOPHIL # BLD AUTO: 0.08 X10(3)/MCL (ref 0–0.9)
EOSINOPHIL NFR BLD AUTO: 0.6 %
ERYTHROCYTE [DISTWIDTH] IN BLOOD BY AUTOMATED COUNT: 14.1 % (ref 11.5–17)
EST. AVERAGE GLUCOSE BLD GHB EST-MCNC: 228.8 MG/DL
GFR SERPLBLD CREATININE-BSD FMLA CKD-EPI: 26 MLS/MIN/1.73/M2
GLUCOSE SERPL-MCNC: 133 MG/DL (ref 75–121)
HBA1C MFR BLD: 9.6 %
HCT VFR BLD AUTO: 37.5 % (ref 37–47)
HGB BLD-MCNC: 11.5 GM/DL (ref 12–16)
IMM GRANULOCYTES # BLD AUTO: 0.04 X10(3)/MCL (ref 0–0.04)
IMM GRANULOCYTES NFR BLD AUTO: 0.3 %
LYMPHOCYTES # BLD AUTO: 2.99 X10(3)/MCL (ref 0.6–4.6)
LYMPHOCYTES NFR BLD AUTO: 21.1 %
MCH RBC QN AUTO: 28 PG (ref 27–31)
MCHC RBC AUTO-ENTMCNC: 30.7 MG/DL (ref 33–36)
MCV RBC AUTO: 91.2 FL (ref 80–94)
MONOCYTES # BLD AUTO: 0.99 X10(3)/MCL (ref 0.1–1.3)
MONOCYTES NFR BLD AUTO: 7 %
NEUTROPHILS # BLD AUTO: 10 X10(3)/MCL (ref 2.1–9.2)
NEUTROPHILS NFR BLD AUTO: 70.7 %
NRBC BLD AUTO-RTO: 0 %
PLATELET # BLD AUTO: 456 X10(3)/MCL (ref 130–400)
PMV BLD AUTO: 9.7 FL (ref 7.4–10.4)
POTASSIUM SERPL-SCNC: 4.7 MMOL/L (ref 3.5–5.1)
RBC # BLD AUTO: 4.11 X10(6)/MCL (ref 4.2–5.4)
SODIUM SERPL-SCNC: 139 MMOL/L (ref 132–146)
WBC # SPEC AUTO: 14.2 X10(3)/MCL (ref 4.5–11.5)

## 2022-11-09 PROCEDURE — 99284 EMERGENCY DEPT VISIT MOD MDM: CPT | Mod: 25

## 2022-11-09 PROCEDURE — 80048 BASIC METABOLIC PNL TOTAL CA: CPT | Performed by: PHYSICIAN ASSISTANT

## 2022-11-09 PROCEDURE — 85025 COMPLETE CBC W/AUTO DIFF WBC: CPT | Performed by: PHYSICIAN ASSISTANT

## 2022-11-09 PROCEDURE — 83036 HEMOGLOBIN GLYCOSYLATED A1C: CPT | Performed by: PHYSICIAN ASSISTANT

## 2022-11-09 PROCEDURE — 63600175 PHARM REV CODE 636 W HCPCS: Performed by: PHYSICIAN ASSISTANT

## 2022-11-09 PROCEDURE — 96372 THER/PROPH/DIAG INJ SC/IM: CPT | Performed by: PHYSICIAN ASSISTANT

## 2022-11-09 RX ORDER — DICLOFENAC SODIUM 75 MG/1
75 TABLET, DELAYED RELEASE ORAL 2 TIMES DAILY PRN
Qty: 20 TABLET | Refills: 0 | Status: SHIPPED | OUTPATIENT
Start: 2022-11-09 | End: 2022-12-21

## 2022-11-09 RX ORDER — FLASH GLUCOSE SCANNING READER
EACH MISCELLANEOUS
Qty: 1 EACH | Refills: 1 | Status: SHIPPED | OUTPATIENT
Start: 2022-11-09

## 2022-11-09 RX ORDER — FLASH GLUCOSE SENSOR
KIT MISCELLANEOUS
Qty: 1 KIT | Refills: 11 | Status: SHIPPED | OUTPATIENT
Start: 2022-11-09

## 2022-11-09 RX ORDER — KETOROLAC TROMETHAMINE 30 MG/ML
15 INJECTION, SOLUTION INTRAMUSCULAR; INTRAVENOUS
Status: COMPLETED | OUTPATIENT
Start: 2022-11-09 | End: 2022-11-09

## 2022-11-09 RX ADMIN — KETOROLAC TROMETHAMINE 15 MG: 30 INJECTION, SOLUTION INTRAMUSCULAR at 01:11

## 2022-11-09 NOTE — TELEPHONE ENCOUNTER
Dr Garcia, received a message from Susan Guillen with [facility], she informed me that patient had a fall and has swelling to the knee and was sent to the ED for evaluation.

## 2022-11-21 NOTE — TELEPHONE ENCOUNTER
I spoke to the person responsible for submitting the PA  in pharmacy and I was informed that the Free Style Elder II did not go through for the patient. She stated it has to be sent to another pharmacy such as [facility] that will bill for Part B of the Medicare to pay for it. She stated that the new prescription should be hand written and include the followin. the name of the equipment (Free Style Elder II, 2. diagnosis code, 3. directions on how often to be tested, 4. the quantity. Thanks

## 2022-11-29 RX ORDER — METOPROLOL TARTRATE 50 MG/1
50 TABLET ORAL 2 TIMES DAILY
Qty: 180 TABLET | Refills: 1 | Status: SHIPPED | OUTPATIENT
Start: 2022-11-29 | End: 2023-02-27

## 2022-11-29 RX ORDER — OMEPRAZOLE 40 MG/1
40 CAPSULE, DELAYED RELEASE ORAL DAILY
Qty: 90 CAPSULE | Refills: 1 | Status: SHIPPED | OUTPATIENT
Start: 2022-11-29 | End: 2023-02-27

## 2022-12-02 NOTE — TELEPHONE ENCOUNTER
Please call a [employer] company and see if they can take her case, dx uncontrolled DM, dementia, falls

## 2022-12-04 DIAGNOSIS — Z79.4 TYPE 2 DIABETES MELLITUS WITHOUT COMPLICATION, WITH LONG-TERM CURRENT USE OF INSULIN: Primary | ICD-10-CM

## 2022-12-04 DIAGNOSIS — F02.B0 MODERATE DEMENTIA ASSOCIATED WITH OTHER UNDERLYING DISEASE, WITHOUT BEHAVIORAL DISTURBANCE, PSYCHOTIC DISTURBANCE, MOOD DISTURBANCE, OR ANXIETY: ICD-10-CM

## 2022-12-04 DIAGNOSIS — E11.9 TYPE 2 DIABETES MELLITUS WITHOUT COMPLICATION, WITH LONG-TERM CURRENT USE OF INSULIN: Primary | ICD-10-CM

## 2022-12-04 RX ORDER — LANCETS 33 GAUGE
EACH MISCELLANEOUS
Qty: 100 EACH | Refills: 3 | Status: SHIPPED | OUTPATIENT
Start: 2022-12-04

## 2022-12-04 RX ORDER — CALCIUM CITRATE/VITAMIN D3 200MG-6.25
TABLET ORAL
Qty: 100 EACH | Refills: 3 | Status: SHIPPED | OUTPATIENT
Start: 2022-12-04

## 2022-12-05 NOTE — TELEPHONE ENCOUNTER
[facility] will take the patient and the referral, last clinical note, and insurance information will need to be faxed to them. Thanks

## 2022-12-21 ENCOUNTER — OFFICE VISIT (OUTPATIENT)
Dept: FAMILY MEDICINE | Facility: CLINIC | Age: 87
End: 2022-12-21
Payer: MEDICARE

## 2022-12-21 VITALS
OXYGEN SATURATION: 100 % | HEART RATE: 74 BPM | TEMPERATURE: 99 F | RESPIRATION RATE: 20 BRPM | BODY MASS INDEX: 31.2 KG/M2 | HEIGHT: 62 IN | DIASTOLIC BLOOD PRESSURE: 64 MMHG | SYSTOLIC BLOOD PRESSURE: 116 MMHG | WEIGHT: 169.56 LBS

## 2022-12-21 DIAGNOSIS — M17.0 OSTEOARTHRITIS OF BOTH KNEES, UNSPECIFIED OSTEOARTHRITIS TYPE: ICD-10-CM

## 2022-12-21 DIAGNOSIS — I10 HYPERTENSION, UNSPECIFIED TYPE: ICD-10-CM

## 2022-12-21 DIAGNOSIS — F03.90 DEMENTIA, UNSPECIFIED DEMENTIA SEVERITY, UNSPECIFIED DEMENTIA TYPE, UNSPECIFIED WHETHER BEHAVIORAL, PSYCHOTIC, OR MOOD DISTURBANCE OR ANXIETY: Primary | ICD-10-CM

## 2022-12-21 DIAGNOSIS — E11.9 TYPE 2 DIABETES MELLITUS WITHOUT COMPLICATION, WITH LONG-TERM CURRENT USE OF INSULIN: ICD-10-CM

## 2022-12-21 DIAGNOSIS — Z79.4 TYPE 2 DIABETES MELLITUS WITHOUT COMPLICATION, WITH LONG-TERM CURRENT USE OF INSULIN: ICD-10-CM

## 2022-12-21 PROCEDURE — 99214 OFFICE O/P EST MOD 30 MIN: CPT | Mod: PBBFAC | Performed by: FAMILY MEDICINE

## 2022-12-21 RX ORDER — DICLOFENAC SODIUM 75 MG/1
75 TABLET, DELAYED RELEASE ORAL DAILY
Qty: 90 TABLET | Refills: 1 | Status: SHIPPED | OUTPATIENT
Start: 2022-12-21 | End: 2023-02-02

## 2022-12-21 RX ORDER — PRAVASTATIN SODIUM 20 MG/1
TABLET ORAL
Qty: 90 TABLET | Refills: 1 | Status: SHIPPED | OUTPATIENT
Start: 2022-12-21

## 2022-12-21 RX ORDER — MEMANTINE HYDROCHLORIDE 5 MG/1
5 TABLET ORAL 2 TIMES DAILY
Qty: 180 TABLET | Refills: 1 | Status: SHIPPED | OUTPATIENT
Start: 2022-12-21

## 2022-12-21 RX ORDER — TRAZODONE HYDROCHLORIDE 50 MG/1
50 TABLET ORAL NIGHTLY
Qty: 90 TABLET | Refills: 1 | Status: SHIPPED | OUTPATIENT
Start: 2022-12-21 | End: 2023-02-02

## 2022-12-21 NOTE — PROGRESS NOTES
"Subjective:       Patient ID: Michelle Saenz is a 89 y.o. female.    Chief Complaint: Follow-up, Diabetes, and Insomnia    HPI  Called pt to come in and bring meds to review, has new HH agency now, and there was report of patient slipping off toilet and sitting on floor when they arrived    Pt is very alert, talkative and well kept, reports she actually does walk around at home limited without assistance and needs standby assist for dressing and washing up, niece comes to do bath 2-3 days/week, advised to use walker at all times, conflicting reports from pt and  (primary caregiver) on patent's true activity level in home    -denies injury or pain related to recent "slip" off toilet, HH found her on floor and helped her up,  reminded to call 911 if needs help to get her up, pt has declined PACE program eval and not sure if she would qualify with current insurance    -pt denied any c/o today except usual request for sleep meds and knee pain    -BL knee pain - XR 2021 -BL DJD, moderate disease, joint space narrowing, per  does much better when on Voltaren, walks around more and less falls          DM  Doing 30u am, 26 units PM  CBG this am 140  Reports CBG in AM last week 20s - pt was up and alert and complaining of insomnia, then up to 190s the next day  Good appt but doesn't eat much in general   Seems  may be giving insulin more regularly now, will ask HH for assistance in keeping home logs    HTN  Soft today, no s/s orthostasis    Fall  One fall when getting off toilet last week - no injuries  Nicole - niece helps care for her a lot    Pravachol, Namenda - in need of refills, seems has been out for few weeks  - called pharmacy for fill dates      Review of Systems  Constitutional: no fever, good appt  ENT: no sore throat, ear pain,  nasal congestion/drainage  Respiratory: no cough, wheezing, SOB  Cardiovascular: no CP, palpitations, edema  Gastrointestinal: no NVD, ABD pain, blood " "in stool, melena      Objective:      Physical Exam    Vitals:    12/21/22 1337   BP: 116/64   BP Location: Right arm   Patient Position: Sitting   BP Method: Large (Automatic)   Pulse: 74   Resp: 20   Temp: 98.6 °F (37 °C)   TempSrc: Oral   SpO2: 100%   Weight: 76.9 kg (169 lb 8.5 oz)   Height: 5' 2.01" (1.575 m)     General - NAD, comfortable, weight stable, very alert, recognizes me by name, very engaged in visit, answers all questions easily but some answers may be incorrect  HEENT- nl TM,EAC, kaylah w/o redness  Neck - no node, mass ,thyromegaly  Respiratory - CTA BL, no distress  Cardiovascular - RRR, 1-2/6 TABITHA, no rad, trace pretib pitting BL  BL knees  -crepitans, med joint line tend without effusion  Neurologic - no acute focal deficits, nl CN, nl gait and balance      Assessment:       1. Dementia, unspecified dementia severity, unspecified dementia type, unspecified whether behavioral, psychotic, or mood disturbance or anxiety    2. Osteoarthritis of both knees, unspecified osteoarthritis type    3. Type 2 diabetes mellitus without complication, with long-term current use of insulin    4. Hypertension, unspecified type            Plan:       Filled Pravachol, namenda - will need to get back on - possibly out of meds for couple months  Increase Trazodone 50mg to 75mg (1.5)  Continue 30 units am and  reduce PM insulin 26 to 22 units  Restart Voltaren only once daily  Need HH assistance for PT, CBG log and med adherence  Consider ortho for BL knee injections - pt declined today  RTC 1/2023 as raimundo        "

## 2022-12-27 ENCOUNTER — TELEPHONE (OUTPATIENT)
Dept: FAMILY MEDICINE | Facility: CLINIC | Age: 87
End: 2022-12-27
Payer: MEDICARE

## 2022-12-27 RX ORDER — INSULIN GLARGINE 100 [IU]/ML
INJECTION, SOLUTION SUBCUTANEOUS
Qty: 15 ML | Refills: 5
Start: 2022-12-27

## 2022-12-27 NOTE — TELEPHONE ENCOUNTER
Please print currnet med list and fax to HH - call them and ask them to verify her list of meds with what she si being given in home, there were some she didn't have at recent visit and I sent refills, please ask for PT if not already doing and ask of they can instruct on home CBG logs to be sent in in 2 weeks, thanks

## 2022-12-27 NOTE — TELEPHONE ENCOUNTER
Please ask HH to draw and fasting BMP one day within the next 10 days, let me know what day they will go so I can watch for results

## 2022-12-28 ENCOUNTER — TELEPHONE (OUTPATIENT)
Dept: FAMILY MEDICINE | Facility: CLINIC | Age: 87
End: 2022-12-28
Payer: MEDICARE

## 2022-12-28 ENCOUNTER — LAB REQUISITION (OUTPATIENT)
Dept: LAB | Facility: HOSPITAL | Age: 87
End: 2022-12-28
Payer: MEDICARE

## 2022-12-28 DIAGNOSIS — E78.5 HYPERLIPIDEMIA, UNSPECIFIED: ICD-10-CM

## 2022-12-28 DIAGNOSIS — E11.9 TYPE 2 DIABETES MELLITUS WITHOUT COMPLICATIONS: ICD-10-CM

## 2022-12-28 LAB
ANION GAP SERPL CALC-SCNC: 10 MEQ/L
BUN SERPL-MCNC: 18.5 MG/DL (ref 9.8–20.1)
CALCIUM SERPL-MCNC: 9.2 MG/DL (ref 8.4–10.2)
CHLORIDE SERPL-SCNC: 105 MMOL/L (ref 98–111)
CO2 SERPL-SCNC: 23 MMOL/L (ref 23–31)
CREAT SERPL-MCNC: 1.39 MG/DL (ref 0.55–1.02)
CREAT/UREA NIT SERPL: 13
GFR SERPLBLD CREATININE-BSD FMLA CKD-EPI: 35 MLS/MIN/1.73/M2
GLUCOSE SERPL-MCNC: 220 MG/DL (ref 75–121)
POTASSIUM SERPL-SCNC: 4.7 MMOL/L (ref 3.5–5.1)
SODIUM SERPL-SCNC: 138 MMOL/L (ref 132–146)

## 2022-12-28 PROCEDURE — 80048 BASIC METABOLIC PNL TOTAL CA: CPT | Performed by: FAMILY MEDICINE

## 2023-01-04 ENCOUNTER — HOSPITAL ENCOUNTER (EMERGENCY)
Facility: HOSPITAL | Age: 88
Discharge: HOME | End: 2023-01-04
Attending: INTERNAL MEDICINE
Payer: MEDICARE

## 2023-01-04 VITALS
SYSTOLIC BLOOD PRESSURE: 192 MMHG | TEMPERATURE: 98 F | DIASTOLIC BLOOD PRESSURE: 80 MMHG | OXYGEN SATURATION: 97 % | HEIGHT: 67 IN | HEART RATE: 84 BPM | WEIGHT: 167.56 LBS | RESPIRATION RATE: 18 BRPM | BODY MASS INDEX: 26.3 KG/M2

## 2023-01-04 DIAGNOSIS — N30.90 CYSTITIS WITHOUT HEMATURIA: ICD-10-CM

## 2023-01-04 DIAGNOSIS — R55 SYNCOPE: Primary | ICD-10-CM

## 2023-01-04 LAB
ALBUMIN SERPL-MCNC: 3.1 G/DL (ref 3.4–4.8)
ALBUMIN/GLOB SERPL: 0.7 RATIO (ref 1.1–2)
ALP SERPL-CCNC: 89 UNIT/L (ref 40–150)
ALT SERPL-CCNC: 5 UNIT/L (ref 0–55)
APPEARANCE UR: CLEAR
AST SERPL-CCNC: 8 UNIT/L (ref 5–34)
BACTERIA #/AREA URNS AUTO: ABNORMAL /HPF
BASOPHILS # BLD AUTO: 0.03 X10(3)/MCL (ref 0–0.2)
BASOPHILS NFR BLD AUTO: 0.3 %
BILIRUB UR QL STRIP.AUTO: NEGATIVE MG/DL
BILIRUBIN DIRECT+TOT PNL SERPL-MCNC: 0.3 MG/DL
BUN SERPL-MCNC: 17.3 MG/DL (ref 9.8–20.1)
CALCIUM SERPL-MCNC: 9.8 MG/DL (ref 8.4–10.2)
CHLORIDE SERPL-SCNC: 106 MMOL/L (ref 98–111)
CO2 SERPL-SCNC: 26 MMOL/L (ref 23–31)
COLOR UR AUTO: ABNORMAL
CREAT SERPL-MCNC: 1.57 MG/DL (ref 0.55–1.02)
EOSINOPHIL # BLD AUTO: 0.14 X10(3)/MCL (ref 0–0.9)
EOSINOPHIL NFR BLD AUTO: 1.3 %
ERYTHROCYTE [DISTWIDTH] IN BLOOD BY AUTOMATED COUNT: 14.4 % (ref 11–14.5)
GFR SERPLBLD CREATININE-BSD FMLA CKD-EPI: 31 MLS/MIN/1.73/M2
GLOBULIN SER-MCNC: 4.2 GM/DL (ref 2.4–3.5)
GLUCOSE SERPL-MCNC: 153 MG/DL (ref 75–121)
GLUCOSE UR QL STRIP.AUTO: NORMAL MG/DL
HCT VFR BLD AUTO: 37.8 % (ref 37–47)
HGB BLD-MCNC: 11.5 GM/DL (ref 12–16)
HYALINE CASTS #/AREA URNS LPF: >20 /LPF
IMM GRANULOCYTES # BLD AUTO: 0.03 X10(3)/MCL (ref 0–0.04)
IMM GRANULOCYTES NFR BLD AUTO: 0.3 %
KETONES UR QL STRIP.AUTO: NEGATIVE MG/DL
LEUKOCYTE ESTERASE UR QL STRIP.AUTO: 25 UNIT/L
LYMPHOCYTES # BLD AUTO: 1.71 X10(3)/MCL (ref 0.6–4.6)
LYMPHOCYTES NFR BLD AUTO: 16.4 %
MAGNESIUM SERPL-MCNC: 1.8 MG/DL (ref 1.6–2.6)
MCH RBC QN AUTO: 28.8 PG
MCHC RBC AUTO-ENTMCNC: 30.4 MG/DL (ref 33–36)
MCV RBC AUTO: 94.7 FL (ref 80–94)
MONOCYTES # BLD AUTO: 0.65 X10(3)/MCL (ref 0.1–1.3)
MONOCYTES NFR BLD AUTO: 6.2 %
MUCOUS THREADS URNS QL MICRO: ABNORMAL /LPF
NEUTROPHILS # BLD AUTO: 7.85 X10(3)/MCL (ref 2.1–9.2)
NEUTROPHILS NFR BLD AUTO: 75.5 %
NITRITE UR QL STRIP.AUTO: NEGATIVE
NRBC BLD AUTO-RTO: 0 % (ref 0–1)
PH UR STRIP.AUTO: 6 [PH]
PLATELET # BLD AUTO: 385 X10(3)/MCL (ref 140–371)
PMV BLD AUTO: 9.8 FL (ref 9.4–12.4)
POCT GLUCOSE: 149 MG/DL (ref 70–110)
POTASSIUM SERPL-SCNC: 4.8 MMOL/L (ref 3.5–5.1)
PROT SERPL-MCNC: 7.3 GM/DL (ref 5.8–7.6)
PROT UR QL STRIP.AUTO: ABNORMAL MG/DL
RBC # BLD AUTO: 3.99 X10(6)/MCL (ref 4.2–5.4)
RBC #/AREA URNS AUTO: ABNORMAL /HPF
RBC UR QL AUTO: NEGATIVE UNIT/L
SODIUM SERPL-SCNC: 142 MMOL/L (ref 132–146)
SP GR UR STRIP.AUTO: 1.02
SQUAMOUS #/AREA URNS LPF: ABNORMAL /HPF
TROPONIN I SERPL-MCNC: <0.01 NG/ML (ref 0–0.04)
UROBILINOGEN UR STRIP-ACNC: NORMAL MG/DL
WBC # SPEC AUTO: 10.4 X10(3)/MCL (ref 4.5–11.5)
WBC #/AREA URNS AUTO: ABNORMAL /HPF

## 2023-01-04 PROCEDURE — 85025 COMPLETE CBC W/AUTO DIFF WBC: CPT | Performed by: INTERNAL MEDICINE

## 2023-01-04 PROCEDURE — 80053 COMPREHEN METABOLIC PANEL: CPT | Performed by: INTERNAL MEDICINE

## 2023-01-04 PROCEDURE — 81001 URINALYSIS AUTO W/SCOPE: CPT | Performed by: INTERNAL MEDICINE

## 2023-01-04 PROCEDURE — 99285 EMERGENCY DEPT VISIT HI MDM: CPT | Mod: 25

## 2023-01-04 PROCEDURE — 82962 GLUCOSE BLOOD TEST: CPT

## 2023-01-04 PROCEDURE — 87088 URINE BACTERIA CULTURE: CPT | Performed by: INTERNAL MEDICINE

## 2023-01-04 PROCEDURE — 84484 ASSAY OF TROPONIN QUANT: CPT | Performed by: INTERNAL MEDICINE

## 2023-01-04 PROCEDURE — 83735 ASSAY OF MAGNESIUM: CPT | Performed by: INTERNAL MEDICINE

## 2023-01-04 PROCEDURE — 93005 ELECTROCARDIOGRAM TRACING: CPT

## 2023-01-04 RX ORDER — TALC
3 POWDER (GRAM) TOPICAL NIGHTLY PRN
Qty: 30 TABLET | Refills: 0 | Status: SHIPPED | OUTPATIENT
Start: 2023-01-04

## 2023-01-04 RX ORDER — DOXYCYCLINE 100 MG/1
100 CAPSULE ORAL 2 TIMES DAILY
Qty: 20 CAPSULE | Refills: 0 | Status: SHIPPED | OUTPATIENT
Start: 2023-01-04 | End: 2023-01-14

## 2023-01-04 NOTE — ED PROVIDER NOTES
"Encounter Date: 1/4/2023       History     Chief Complaint   Patient presents with    Weakness     Family reports patient has not slept in 3 days, tired, reports near syncopal episode without injury     Presents with a syncope episode. Per EMS, the patient was at home with her sitter and eating dinner. The sitter stepped outside for a few minutes and when they returned found the patient slumped over in her chair non responsive. The patient is A/O x 1. She reports that she is currently not having any pain, and that she is tired and would like some medicine for sleep.    The history is provided by the EMS personnel.   Review of patient's allergies indicates:   Allergen Reactions    Lisinopril Other (See Comments)     "cough"       History reviewed. No pertinent past medical history.  Past Surgical History:   Procedure Laterality Date    CYSTOSCOPY  2018     History reviewed. No pertinent family history.  Social History     Tobacco Use    Smoking status: Every Day     Packs/day: 0.25     Types: Cigarettes     Passive exposure: Never    Smokeless tobacco: Never    Tobacco comments:     pt refuses   Substance Use Topics    Alcohol use: Not Currently    Drug use: Never     Review of Systems   Constitutional:  Negative for fever.   HENT:  Negative for drooling and sore throat.    Respiratory:  Negative for shortness of breath.    Cardiovascular:  Negative for chest pain.   Gastrointestinal:  Negative for abdominal distention, abdominal pain and nausea.   Genitourinary:  Negative for dysuria.   Musculoskeletal:  Negative for back pain.   Skin:  Negative for rash.   Neurological:  Positive for dizziness, syncope, speech difficulty, weakness and light-headedness. Negative for facial asymmetry and numbness.   Hematological:  Does not bruise/bleed easily.   Psychiatric/Behavioral:  Positive for confusion.    All other systems reviewed and are negative.    Physical Exam     Initial Vitals   BP Pulse Resp Temp SpO2   01/04/23 " 1750 01/04/23 1750 01/04/23 1750 01/04/23 2003 01/04/23 1750   (!) 166/87 85 18 97.7 °F (36.5 °C) 100 %      MAP       --                Physical Exam    Nursing note and vitals reviewed.  Constitutional: She appears well-developed and well-nourished.  Non-toxic appearance. She does not have a sickly appearance. She does not appear ill.   HENT:   Head: Normocephalic and atraumatic.   Mouth/Throat: Oropharynx is clear and moist and mucous membranes are normal.   Eyes: Conjunctivae and EOM are normal. Pupils are equal, round, and reactive to light.   Neck: Neck supple.   Normal range of motion.  Cardiovascular:  Normal rate, regular rhythm and intact distal pulses.           Murmur heard.  Pulmonary/Chest: Breath sounds normal.   Abdominal: Abdomen is soft. Bowel sounds are normal. She exhibits no distension. There is no abdominal tenderness. There is no rebound and no guarding.   Musculoskeletal:         General: No edema. Normal range of motion.      Cervical back: Normal range of motion and neck supple.     Neurological: She is alert. She has normal strength. No cranial nerve deficit. GCS eye subscore is 4. GCS verbal subscore is 5. GCS motor subscore is 6.   A/O x 1   Skin: Skin is warm, dry and intact. Capillary refill takes less than 2 seconds. No rash noted.   Diffuse dry, flaky skin   Psychiatric: Her behavior is normal.       ED Course   Procedures  Labs Reviewed   COMPREHENSIVE METABOLIC PANEL - Abnormal; Notable for the following components:       Result Value    Glucose Level 153 (*)     Creatinine 1.57 (*)     Albumin Level 3.1 (*)     Globulin 4.2 (*)     Albumin/Globulin Ratio 0.7 (*)     All other components within normal limits   CBC WITH DIFFERENTIAL - Abnormal; Notable for the following components:    RBC 3.99 (*)     Hgb 11.5 (*)     MCV 94.7 (*)     MCHC 30.4 (*)     Platelet 385 (*)     All other components within normal limits   URINALYSIS - Abnormal; Notable for the following components:     Protein, UA Trace (*)     Leukocyte Esterase, UA 25 (*)     WBC, UA 11-20 (*)     Squamous Epithelial Cells, UA Trace (*)     Mucous, UA Trace (*)     Hyaline Casts, UA >20 (*)     All other components within normal limits   POCT GLUCOSE - Abnormal; Notable for the following components:    POCT Glucose 149 (*)     All other components within normal limits   TROPONIN I - Normal   MAGNESIUM - Normal   CULTURE, URINE   CBC W/ AUTO DIFFERENTIAL    Narrative:     The following orders were created for panel order CBC auto differential.  Procedure                               Abnormality         Status                     ---------                               -----------         ------                     CBC with Differential[[id number]]        Abnormal            Final result                 Please view results for these tests on the individual orders.   EXTRA TUBES    Narrative:     The following orders were created for panel order EXTRA TUBES.  Procedure                               Abnormality         Status                     ---------                               -----------         ------                     Light Blue Top Hold[[id number]]                              In process                 Gold Top Hold[[id number]]                                    In process                   Please view results for these tests on the individual orders.   LIGHT BLUE TOP HOLD   GOLD TOP HOLD   POCT GLUCOSE, HAND-HELD DEVICE     EKG Readings: (Independently Interpreted)   Initial Reading: No STEMI. Rhythm: Normal Sinus Rhythm. Ectopy: No Ectopy. Conduction: Non specific widening QRS. ST Segments: Normal ST Segments. T Waves: Normal. Axis: Left Axis Deviation. Other Findings: U Waves and Prolonged QT Interval. Clinical Impression: Normal Sinus Rhythm     Imaging Results              CT Head Without Contrast (Final result)  Result time 01/04/23 21:09:07      Final result by Andry Castaneda MD (01/04/23 21:09:07)                    Narrative:    EXAMINATION  CT HEAD WITHOUT CONTRAST    CLINICAL HISTORY  Mental status change, unknown cause;Altered mental status, nontraumatic (Ped 0-18y);    TECHNIQUE  Axial non-contrast CT images of the head were acquired and multiplanar reconstructions accomplished by a CT technologist at a separate workstation, pushed to PACS for physician review.    COMPARISON  11 November 2021    FINDINGS  Images were reviewed in subdural, brain, soft tissue, and bone windows.    Exam quality: Motion/streak artifact limits assessment of the posterior fossa.    Hemorrhage: No evidence of acute hyperattenuating blood products.    Parenchyma: There is diffuse bilateral supratentorial white matter hypoattenuation, typical of chronic microvascular changes. No discrete mass, mass effect, or CT evidence of acute large vascular territory insult. Gray-white differentiation is preserved.    Midline shift: None.    CSF spaces: Proportional appearance of ventricular and sulcal enlargement. No hydrocephalus. No masses or fluid collections.    Vasculature: No focally hyperdense artery. Scattered carotid siphon calcifications are present. No abnormal densities within the dural sinuses.    Other findings: No abnormalities of the scalp or subjacent osseous structures. Mastoids are well aerated. No focal abnormality of the sella. The included facial structures are unremarkable.    IMPRESSION  1. No acute intracranial abnormality.  2. Age-related atrophy and chronic sequela of white matter microvascular disease.  3. Additional chronic secondary details discussed above.    RADIATION DOSE  Automated tube current modulation, weight-based exposure dosing, and/or iterative reconstruction technique utilized to reach lowest reasonably achievable exposure rate.    DLP: 846 mGy*cm      Electronically signed by: Andry Castaneda  Date:    01/04/2023  Time:    21:09                                     X-Ray Chest 1 View (Final result)  Result time  01/04/23 18:36:42   Procedure changed from X-Ray Chest PA And Lateral     Final result by Valencia Dougherty MD (01/04/23 18:36:42)                   Impression:      Increased interstitial markings in the right lower lung, may be infectious or inflammatory.      Electronically signed by: Valencia Dougherty  Date:    01/04/2023  Time:    18:36               Narrative:    EXAMINATION:  XR CHEST 1 VIEW    CLINICAL HISTORY:  Syncope and collapse pain;    TECHNIQUE:  AP chest    COMPARISON:  Chest x-ray dated 02/16/2022    FINDINGS:  The heart is normal in size.  There are increased interstitial markings in the right lower lung.  There is otherwise no focal airspace consolidation.  There is no pleural effusion or visible pneumothorax.                                       Medications - No data to display  Medical Decision Making:   History:   I obtained history from: EMS provider.  Initial Assessment:   89 yo F that presents for reported syncope. On exam the patient is A/O x 1 (baseline) and unable to provide accurate history. CT head showed no acute abnormalities. Chest x-ray revealed some increased right lower lobe interstitial markings suspicious for pneumonia. Urinalysis showed leukocyte sterase and WBCs. The patient was discharged with doxycycline for empiric coverage of both possible UTI and atypical pneumonia.                         Clinical Impression:   Final diagnoses:  [R55] Syncope (Primary)  [N30.90] Cystitis without hematuria        ED Disposition Condition    Discharge Stable          ED Prescriptions       Medication Sig Dispense Start Date End Date Auth. Provider    doxycycline (VIBRAMYCIN) 100 MG Cap Take 1 capsule (100 mg total) by mouth 2 (two) times daily. for 10 days 20 capsule 1/4/2023 1/14/2023 Jarred Scott MD    melatonin (MELATIN) 3 mg tablet Take 1 tablet (3 mg total) by mouth nightly as needed for Insomnia. 30 tablet 1/4/2023 -- Jarred Scott MD           Follow-up Information       Follow up With Specialties Details Why Contact Info    [facility] Emergency Medicine Go to  If symptoms worsen [address] [id number] [phone]    Evangelina Garcia MD Family Medicine Schedule an appointment as soon as possible for a visit in 1 week  [address] [facility] [address]  [phone]               Jarred Scott MD  01/04/23 2138

## 2023-01-07 LAB — BACTERIA UR CULT: NO GROWTH

## 2023-02-02 ENCOUNTER — OFFICE VISIT (OUTPATIENT)
Dept: FAMILY MEDICINE | Facility: CLINIC | Age: 88
End: 2023-02-02
Payer: MEDICARE

## 2023-02-02 VITALS
TEMPERATURE: 99 F | HEIGHT: 67 IN | DIASTOLIC BLOOD PRESSURE: 77 MMHG | RESPIRATION RATE: 20 BRPM | HEART RATE: 69 BPM | OXYGEN SATURATION: 100 % | WEIGHT: 166.44 LBS | SYSTOLIC BLOOD PRESSURE: 166 MMHG | BODY MASS INDEX: 26.12 KG/M2

## 2023-02-02 DIAGNOSIS — R94.31 ABNORMAL EKG: ICD-10-CM

## 2023-02-02 DIAGNOSIS — I10 HYPERTENSION, UNSPECIFIED TYPE: ICD-10-CM

## 2023-02-02 DIAGNOSIS — F02.B0 MODERATE DEMENTIA ASSOCIATED WITH OTHER UNDERLYING DISEASE, WITHOUT BEHAVIORAL DISTURBANCE, PSYCHOTIC DISTURBANCE, MOOD DISTURBANCE, OR ANXIETY: ICD-10-CM

## 2023-02-02 DIAGNOSIS — Z79.4 TYPE 2 DIABETES MELLITUS WITHOUT COMPLICATION, WITH LONG-TERM CURRENT USE OF INSULIN: Primary | ICD-10-CM

## 2023-02-02 DIAGNOSIS — R55 SYNCOPE, UNSPECIFIED SYNCOPE TYPE: ICD-10-CM

## 2023-02-02 DIAGNOSIS — R55 SYNCOPE AND COLLAPSE: ICD-10-CM

## 2023-02-02 DIAGNOSIS — E11.9 TYPE 2 DIABETES MELLITUS WITHOUT COMPLICATION, WITH LONG-TERM CURRENT USE OF INSULIN: Primary | ICD-10-CM

## 2023-02-02 DIAGNOSIS — M17.0 OSTEOARTHRITIS OF BOTH KNEES, UNSPECIFIED OSTEOARTHRITIS TYPE: ICD-10-CM

## 2023-02-02 DIAGNOSIS — R01.1 HEART MURMUR: ICD-10-CM

## 2023-02-02 LAB — HBA1C MFR BLD: 8.4 %

## 2023-02-02 PROCEDURE — 99215 OFFICE O/P EST HI 40 MIN: CPT | Mod: PBBFAC | Performed by: FAMILY MEDICINE

## 2023-02-02 PROCEDURE — 83036 HEMOGLOBIN GLYCOSYLATED A1C: CPT | Mod: PBBFAC | Performed by: FAMILY MEDICINE

## 2023-02-02 RX ORDER — BENZONATATE 100 MG/1
200 CAPSULE ORAL 3 TIMES DAILY PRN
Qty: 30 CAPSULE | Refills: 1 | Status: ON HOLD | OUTPATIENT
Start: 2023-02-02 | End: 2023-04-05 | Stop reason: HOSPADM

## 2023-02-02 RX ORDER — MINERAL OIL
180 ENEMA (ML) RECTAL DAILY
Qty: 30 TABLET | Refills: 0 | Status: SHIPPED | OUTPATIENT
Start: 2023-02-02

## 2023-02-02 RX ORDER — AMOXICILLIN 875 MG/1
875 TABLET, FILM COATED ORAL 2 TIMES DAILY
Qty: 20 TABLET | Refills: 0 | Status: ON HOLD | OUTPATIENT
Start: 2023-02-02 | End: 2023-04-05 | Stop reason: HOSPADM

## 2023-02-02 NOTE — PROGRESS NOTES
Subjective:       Patient ID: Michelle Saenz is a 89 y.o. female.    Chief Complaint: Diabetes (FU osteoarthritis leg weakness)    HPI    DM: insulin  30units AM, 22 PM, diet inconsistent, skips meals and some days picky, logs reviewed , rare 55-75, most 130-230  HTN: controlled, home logs good  Dementia: no progression per , has nieces who come by and give bath 4d/week,  administers meds and ck CBG, has another niece that comes to do light housework and change sheets, etc, sleeping little better with Melatonin from ED and is off TRazadone    Knee DJD: now willing to do knee IA injections, has more trouble getting around lately due to knee DJD, contributing  to fall risk, now willing to get wheelchair, has canes and rollator walker at home but pt often refuses to use, used to walk around home unassisted (not advised) but now cannot due to knee pain    ER: 1/3/2023 syncopal episode while eating chicken and rice dressing at home, was back to normal by  the time the ambulance got there,  LOC 1-2 minutes, no jerking mments/incontinence - CT, labs with trop -no acute findings except prolonged QT, CXR = RLL prom IS markings,   rx Doxycycline, Melatonin ,sleeping better, trazodone stopped    C/o cough x 1 week, Robitussin = no  relief, see ROS      Health Maintenance  Mammo: 7/2019, pt considering repeat  DEXA: 4/2019, 9/2021-stable, treatment declined  FIT 8/2020 neg  PAP no recent, had supracervical hyst  LDCT: insurance declined due to age,  2/2022 CXR ok     Care Team  CARD: no prior SCHREIBER, EKG 2/2022  [facility] ophth: 2/2023 NEW pt -re-estab  [facility] - 2/2023      PMH  DM  HTN  HLD EKG 5/2021 stable, Carotids clear 4.2018  GERD  MAGDY  Depression/Insomnia  Dysphagia:  mild-mod 3/2021 esophagram  Osteopenia: hip T -2.1 9/2021, declined treatment  BL knee pain: Moderate DJD per 9/2021 knee x-rays     Tobacco use: Continues to smoke 1.5 packs/day, does not want to quit, concerned about unsupervised smoking,  family aware that she should be monitored at all times,  does not smoke in bed but does smoke in the chair in her bedroom     Cognitive decline/Mild Dementia: Repeats self daily, still recognizes all family members, forgetful for short-term events, distant memory intact  Does not cook or drive, no aggressive or resistive behavior, is very well kept, manages most ADLs, standby assistance for dressing, toileting and brushing teeth independent  Remains continent of bowels and bladder  Risk/benefits/side effects of treatments for dementia discussed-although at this advanced age when to stand there may be limited benefit  however the family wished for trial of medication if  able to preserve some of the patient's independence     Advance directives  Code:-Full, declined prolonged life support, does not want feeding tube  HC POA: First-Scotty Saenz,   Second-nephew eLe Kaufman   and niece who were present were made aware of these existing directives    Current Outpatient Medications   Medication Instructions    aspirin (ECOTRIN) 81 mg, Oral, Daily    ferrous sulfate (FEOSOL) 325 mg, Oral, With breakfast    insulin (LANTUS SOLOSTAR U-100 INSULIN) glargine 100 units/mL SubQ pen Inject 30 units subq Q am, and 22 units q PM    melatonin (MELATIN) 3 mg, Oral, Nightly PRN    memantine (NAMENDA) 5 mg, Oral, 2 times daily, For memory    metFORMIN (GLUCOPHAGE-XR) 500 mg, Oral, 2 times daily with meals    metoprolol tartrate (LOPRESSOR) 50 mg, Oral, 2 times daily    omeprazole (PRILOSEC) 40 mg, Oral, Daily    pravastatin (PRAVACHOL) 20 MG tablet SMARTSI Tablet(s) By Mouth Every Evening         ROS  Constitutional: no fever, sleeps better, denies depression/crying  ENT: no sore throat, ear pain,  mild nasal congestion/drainage - cloudy/yellow, no sneezing  Respiratory: no  wheezing, SOB, dry croupy cough x 5 days  Cardiovascular: no CP, palpitations, edema  Gastrointestinal: no NVD, ABD pain, blood in stool,  "melena  Genitourinary: no dysuria, frequency, urgency, hematuria    PE  Vitals:    02/02/23 1016   BP: (!) 166/77   BP Location: Left arm   Patient Position: Sitting   BP Method: Large (Automatic)   Pulse: 69   Resp: 20   Temp: 98.8 °F (37.1 °C)   TempSrc: Oral   SpO2: 100%   Weight: 75.5 kg (166 lb 7.2 oz)   Height: 5' 6.93" (1.7 m)     General - NAD, comfortable,sitting in wheelchair, answers simple questions but not always correct per , well kept  HEENT - BL max tend, mild, EAC TM-  no redness or abnl, kaylah - no redness  Neck - no node, mass ,thyromegaly  Respiratory - CTA BL, no distress  Cardiovascular - RRR, 1-2 TABITHA RUSB w/o rad -old, 1+ BL LE edema - no change  Neurologic - no acute focal motor/sensory deficits, CN intact     Assessment  1. Type 2 diabetes mellitus without complication, with long-term current use of insulin    2. Hypertension, unspecified type    3. 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 Syncope, unspecified syncope type    5. Osteoarthritis of both knees, unspecified osteoarthritis type    6. Abnormal EKG    7. Heart murmur    8.      Sinusitis      Plan  MRI Brain  CARD referral for syncope -? Arrythmia, abnl EKG, heart murmur, poss  FRANCIS - symptom h/o not consistent due to underlying dementia  Re-refer SM/ortho now that DM under better control hopefully can get knee injections  Call  for BP readings  Reviewed dangers for ongoing tobacco use in the home and discouraged this,  understands  Amoxil/Tessalon  Poc a1c 8.4  Needs wheelchair for safe mobility around the home and to and from doctor appointments, pt has had multiple falls when using walker due to severe knee DJD  RTC 3mos, sooner prn -consider 2V chest if any cough remains due to tob h/o and aged out for LDCT    Orders Placed This Encounter    MRI Brain Without Contrast    Ambulatory referral/consult to " Cardiology    POCT HEMOGLOBIN A1C    amoxicillin (AMOXIL) 875 MG tablet    benzonatate (TESSALON) 100 MG capsule    fexofenadine (ALLEGRA) 180 MG tablet

## 2023-02-09 ENCOUNTER — TELEPHONE (OUTPATIENT)
Dept: FAMILY MEDICINE | Facility: CLINIC | Age: 88
End: 2023-02-09
Payer: MEDICARE

## 2023-02-09 NOTE — TELEPHONE ENCOUNTER
"Please call her HH and ee if they can order her a wheelchair -the order is under "letters"  OK to print and send  "

## 2023-02-10 NOTE — TELEPHONE ENCOUNTER
Spoke to HH agency and they will send the order to the DME company. I have a copy of the order for your signature to fax over. Thanks

## 2023-02-17 ENCOUNTER — HOSPITAL ENCOUNTER (OUTPATIENT)
Dept: RADIOLOGY | Facility: HOSPITAL | Age: 88
Discharge: HOME | End: 2023-02-17
Attending: FAMILY MEDICINE
Payer: MEDICARE

## 2023-02-17 DIAGNOSIS — R55 SYNCOPE AND COLLAPSE: ICD-10-CM

## 2023-02-17 PROCEDURE — 70551 MRI BRAIN STEM W/O DYE: CPT | Mod: TC

## 2023-02-23 ENCOUNTER — OFFICE VISIT (OUTPATIENT)
Dept: ORTHOPEDICS | Facility: CLINIC | Age: 88
End: 2023-02-23
Payer: MEDICARE

## 2023-02-23 DIAGNOSIS — E08.65 DIABETES MELLITUS DUE TO UNDERLYING CONDITION, UNCONTROLLED, WITH HYPERGLYCEMIA: ICD-10-CM

## 2023-02-23 DIAGNOSIS — I10 PRIMARY HYPERTENSION: ICD-10-CM

## 2023-02-23 DIAGNOSIS — M17.12 PRIMARY OSTEOARTHRITIS OF LEFT KNEE: Primary | ICD-10-CM

## 2023-02-23 DIAGNOSIS — M17.11 PRIMARY OSTEOARTHRITIS OF RIGHT KNEE: ICD-10-CM

## 2023-02-23 PROCEDURE — 20610 DRAIN/INJ JOINT/BURSA W/O US: CPT | Mod: 50,PBBFAC | Performed by: STUDENT IN AN ORGANIZED HEALTH CARE EDUCATION/TRAINING PROGRAM

## 2023-02-23 PROCEDURE — 99213 OFFICE O/P EST LOW 20 MIN: CPT | Mod: PBBFAC,25 | Performed by: STUDENT IN AN ORGANIZED HEALTH CARE EDUCATION/TRAINING PROGRAM

## 2023-02-23 RX ORDER — TRIAMCINOLONE ACETONIDE 40 MG/ML
40 INJECTION, SUSPENSION INTRA-ARTICULAR; INTRAMUSCULAR
Status: COMPLETED | OUTPATIENT
Start: 2023-02-23 | End: 2023-02-23

## 2023-02-23 RX ORDER — LIDOCAINE HYDROCHLORIDE 10 MG/ML
5 INJECTION, SOLUTION EPIDURAL; INFILTRATION; INTRACAUDAL; PERINEURAL
Status: COMPLETED | OUTPATIENT
Start: 2023-02-23 | End: 2023-02-23

## 2023-02-23 RX ADMIN — TRIAMCINOLONE ACETONIDE 40 MG: 40 INJECTION, SUSPENSION INTRA-ARTICULAR; INTRAMUSCULAR at 01:02

## 2023-02-23 RX ADMIN — LIDOCAINE HYDROCHLORIDE 50 MG: 10 INJECTION, SOLUTION EPIDURAL; INFILTRATION; INTRACAUDAL; PERINEURAL at 01:02

## 2023-02-23 NOTE — PROGRESS NOTES
Subjective:    Patient ID: Michelle Saenz is a 89 y.o. female  who presented to [facility] for follow-up.      Chief Complaint: Pain of the Left Knee      History of Present Illness:  HPI     Michelle Saenz w/ history of DJD of knees for years presents with  re-referred from pcp for possible csi injection.  She was seen previously on August 30, 2022 but unfortunately could not get a steroid injection for knee due to her elevated A1c of 12.  Her A1c is now  8.4 as from February 2nd. Recently seen by her PCP which notes were reviewed and known to have worsening knee pain and hoping to get an injection for relief.  \Has had PT for a few months.  Evaluation to date: imaging with xr-ays showing kl 3 grade oa. Havents haded CSI to knees before.  No surgery. No voltaren gel. Has been using icy hot. Takes tylenol with some relief. Not taking nsaids. No apparent contraindications to nsaids per patient and . Expectations for today's visit includes CSI injection and pain relief.  Occupation includes domestic work. Retired now since 2005. Uses [employer].    Knee Review of Systems:  Swelling?  yes  Instability?  no  Mechanical sx?  no  <30 min AM stiffness? no  Limited ROM? no  Fever/Chills? no    ROS       Objective:      Physical Exam:    There were no vitals taken for this visit.    Ortho/SPM Exam    Appearance:  Normal gait/station  FWB  Alignment: Left: normal Right: normal   Soft tissue swelling: Left: yes Right: yes  Effusion: Left:  Negative Right: Negative  Erythema: Left no Right: no  Ecchymosis: Left: no Right: no  Atrophy: Left: no Right: no    Palpation:  Knee Tenderness: Left: general nonspecific tenderness anteriorly Right: general nonspecific tenderness anteriorly    Range of motion:  Flexion: Left:  120 Right: 130  Extension: Left:30 Right: 40    Strength:  deferred      Special Tests:  Ballotable Effusion:Left: Negative Right: Negative   Fluid  Wave: Left: Negative Right: Negative   Crepitus: Left: Negative Right: Negative         General appearance: NAD  Peripheral pulses: normal bilaterally   Reflexes: Left: normal Right normal   Sensation: normal    Labs:  Last A1c: 8.4   Reviewed bmp labs    Imaging:   Previous images reviewed.  X-rays ordered and performed today: no  # of views: 4 Laterality: bilateral  My Interpretation:  shows DJD changes, likely chronic  Kl grade 4.    Assessment:        Encounter Diagnoses   Name Primary?    Primary osteoarthritis of left knee Yes    Primary osteoarthritis of right knee     Diabetes mellitus due to underlying condition, uncontrolled, with hyperglycemia     Primary hypertension         Plan:       Dx: bilateral Knee Osteoarthritis. Acute in moderate exacerbation.   Treatment Plan: Discussed with patient diagnosis, prognosis, and treatment recommendations. Education provided.  Home physical therapy exercise handouts provided to patient. , RX NSAID - see med list, topical hot or cold therapy, Over the counter NSAID and/or tylenol provided you do not have contraindications such as but not limited to liver or kidney disease or uncontrolled blood pressure. If you're doctors have told you to to not take them based on your health, do not take them. , Using a brace provided to you here or from your local pharmacy or durable medical equipment (DME) store. , oral glucosamine 1500 mg/day. and topical capsaicin as needed continue wearing compressive braces. consider medial offloaders.  Imaging: prior radiological studies independently reviewed; discussed with patient; agree with radiologist interpretation.   Weight Management: is paramount. recommend at least 10% of total body weight loss if your bmi is 30-34.9. A bmi 24.9 or less may provide further relief..   Procedure: Discussed CSI/VSI as treatment options; discussed CSI vs VS injections as treatment options; since conservative measures did not improve symptoms patient  consented for CSI today.   Activity: Activity as tolerated; HEP to include aerobic conditioning and strength training with non-painful activity. ROM/STG exercises. Proper footware; assistive devises to avoid limping.   Therapy: Physical Therapy  Medication: first line treatment with daily acetaminophen. Up to 1000 mg three times daily can be taken; medication precautions given. and topical NSAIDs prescribed; medication precautions given. Please see your primary care physician for further refills.  RTC: PRN; call if any issues.         HTN:   Nonsteroidal anti-inflammatory drugs (NSAIDs) may disrupt control of blood pressure in hypertensive patients and increase their risk of morbidity, mortality, and the costs of care. In general, people with hypertension should use acetaminophen or possibly aspirin for over-the-counter pain relief. Discuss with your primary healthcare provider if the use of NSAIDs is appropriate for you.       Large Joint Aspiration/Injection: bilateral knee    Date/Time: 2/23/2023 12:45 PM  Performed by: Reji Robert MD  Authorized by: Reji Robert MD     Consent Done?:  Yes (Written)  Indications:  Arthritis  Site marked: the procedure site was marked    Timeout: prior to procedure the correct patient, procedure, and site was verified    Prep: patient was prepped and draped in usual sterile fashion      Local anesthesia used?: Yes    Local anesthetic:  Topical anesthetic    Details:  Needle Size:  25 G  Approach:  Anterolateral  Location:  Knee  Laterality:  Bilateral  Site:  Bilateral knee  Patient tolerance:  Patient tolerated the procedure well with no immediate complications     Staff: Reji Robert MD     Risks:  Possible complications with the injection include bleeding, infection (.01%), tendon rupture, steroid flare, fat pad or soft tissue atrophy, skin depigmentation, allergic reaction to medications and vasovagal response. (steroid flare treatment is rest, ice, NSAIDs and  resolves in 24-36 hours.)    Consent:  No absolute contraindications (cellulitis overlying joint, infection, lack of informed consent, allergy to injection medication, AVN protein or egg allergy for sodium hyaluronate, or history of steroid flare) or relative contraindications (uncontrolled DM2 A1c>10, coagulopathy, INR > 3.5, previous joint replacement or history of AVN).        Description:  The patient was prepped in normal sterile fashion use of chlorhexidine scrub and the appropriate and anatomic landmarks were identified without ultrasound.  Contents of syringe included: 5cc of 1% of lidocaine with 40mg of Kenalog     Post Procedure: Patient alert, and moving all extremities. ROM improved, pain decreased.  Good peripheral pulses, no signs of vascular compromise and range of motion intact.  Aftercare instructions were given to patient at time of discharge.  Relative rest for 3 days-avoiding excess activity.  Place ice on the area for 15 minutes every 4-6 hours. Patient may take Tylenol a 1000 mg b.i.d. or ibuprofen 600 mg t.i.d. for the next 3-4 days if not on medication already and safe to take pending co-morbidities.  Protect the area for the next 1-8 hours if anesthetic was used.  Avoid excessive activity for the next 3-4 weeks.  ER precautions given for fever, severe joint pain or allergic reaction or other new symptoms related to the joint injection.

## 2023-02-25 ENCOUNTER — TELEPHONE (OUTPATIENT)
Dept: FAMILY MEDICINE | Facility: CLINIC | Age: 88
End: 2023-02-25
Payer: MEDICARE

## 2023-02-25 NOTE — TELEPHONE ENCOUNTER
MRI Brain results By me  to , doing well overall no new problems, still has HH and nieces who come to help

## 2023-03-06 ENCOUNTER — TELEPHONE (OUTPATIENT)
Dept: FAMILY MEDICINE | Facility: CLINIC | Age: 88
End: 2023-03-06
Payer: MEDICARE

## 2023-03-06 NOTE — TELEPHONE ENCOUNTER
Dr Garcia, Mr Chavez called and is requesting and order for Physical Therapy for Ms Martinez.  Please advise.

## 2023-03-07 NOTE — TELEPHONE ENCOUNTER
Dr Garcia, I spoke to ELAINA Sanchez at [facility] an she stated that nothing new was going on with the patient.  She does need assistance with transfers.  VO given for PT/OT to eval and treat as indicated.

## 2023-03-09 ENCOUNTER — HOSPITAL ENCOUNTER (EMERGENCY)
Facility: HOSPITAL | Age: 88
Discharge: HOME | End: 2023-03-09
Attending: EMERGENCY MEDICINE
Payer: MEDICARE

## 2023-03-09 VITALS
TEMPERATURE: 98 F | HEART RATE: 69 BPM | RESPIRATION RATE: 21 BRPM | SYSTOLIC BLOOD PRESSURE: 165 MMHG | WEIGHT: 165 LBS | HEIGHT: 63 IN | DIASTOLIC BLOOD PRESSURE: 65 MMHG | OXYGEN SATURATION: 99 % | BODY MASS INDEX: 29.23 KG/M2

## 2023-03-09 DIAGNOSIS — R53.1 WEAKNESS: ICD-10-CM

## 2023-03-09 DIAGNOSIS — R53.83 LETHARGY: ICD-10-CM

## 2023-03-09 DIAGNOSIS — R53.1 GENERALIZED WEAKNESS: Primary | ICD-10-CM

## 2023-03-09 LAB
ALBUMIN SERPL-MCNC: 2.9 G/DL (ref 3.4–4.8)
ALBUMIN/GLOB SERPL: 0.9 RATIO (ref 1.1–2)
ALP SERPL-CCNC: 90 UNIT/L (ref 40–150)
ALT SERPL-CCNC: 10 UNIT/L (ref 0–55)
ANION GAP SERPL CALC-SCNC: 9 MEQ/L
APPEARANCE UR: CLEAR
AST SERPL-CCNC: 8 UNIT/L (ref 5–34)
BACTERIA #/AREA URNS AUTO: NORMAL /HPF
BASOPHILS # BLD AUTO: 0.05 X10(3)/MCL (ref 0–0.2)
BASOPHILS NFR BLD AUTO: 0.5 %
BILIRUB UR QL STRIP.AUTO: NEGATIVE MG/DL
BILIRUBIN DIRECT+TOT PNL SERPL-MCNC: 0.5 MG/DL
BUN SERPL-MCNC: 16.9 MG/DL (ref 9.8–20.1)
BUN SERPL-MCNC: 18.3 MG/DL (ref 9.8–20.1)
CALCIUM SERPL-MCNC: 9.4 MG/DL (ref 8.4–10.2)
CALCIUM SERPL-MCNC: 9.7 MG/DL (ref 8.4–10.2)
CHLORIDE SERPL-SCNC: 108 MMOL/L (ref 98–111)
CHLORIDE SERPL-SCNC: 109 MMOL/L (ref 98–111)
CO2 SERPL-SCNC: 25 MMOL/L (ref 23–31)
CO2 SERPL-SCNC: 28 MMOL/L (ref 23–31)
COLOR UR AUTO: YELLOW
CREAT SERPL-MCNC: 1.22 MG/DL (ref 0.55–1.02)
CREAT SERPL-MCNC: 1.37 MG/DL (ref 0.55–1.02)
CREAT/UREA NIT SERPL: 15
EOSINOPHIL # BLD AUTO: 0.19 X10(3)/MCL (ref 0–0.9)
EOSINOPHIL NFR BLD AUTO: 1.8 %
ERYTHROCYTE [DISTWIDTH] IN BLOOD BY AUTOMATED COUNT: 14.5 % (ref 11.5–17)
FLUAV AG UPPER RESP QL IA.RAPID: NOT DETECTED
FLUBV AG UPPER RESP QL IA.RAPID: NOT DETECTED
GFR SERPLBLD CREATININE-BSD FMLA CKD-EPI: 36 MLS/MIN/1.73/M2
GFR SERPLBLD CREATININE-BSD FMLA CKD-EPI: 41 MLS/MIN/1.73/M2
GLOBULIN SER-MCNC: 3.1 GM/DL (ref 2.4–3.5)
GLUCOSE SERPL-MCNC: 137 MG/DL (ref 75–121)
GLUCOSE SERPL-MCNC: 145 MG/DL (ref 75–121)
GLUCOSE UR QL STRIP.AUTO: NEGATIVE MG/DL
HCT VFR BLD AUTO: 36.4 % (ref 37–47)
HGB BLD-MCNC: 10.9 G/DL (ref 12–16)
IMM GRANULOCYTES # BLD AUTO: 0.06 X10(3)/MCL (ref 0–0.04)
IMM GRANULOCYTES NFR BLD AUTO: 0.6 %
KETONES UR QL STRIP.AUTO: NEGATIVE MG/DL
LEUKOCYTE ESTERASE UR QL STRIP.AUTO: ABNORMAL UNIT/L
LYMPHOCYTES # BLD AUTO: 2.17 X10(3)/MCL (ref 0.6–4.6)
LYMPHOCYTES NFR BLD AUTO: 20.4 %
MAGNESIUM SERPL-MCNC: 1.8 MG/DL (ref 1.6–2.6)
MCH RBC QN AUTO: 27.9 PG
MCHC RBC AUTO-ENTMCNC: 29.9 G/DL (ref 33–36)
MCV RBC AUTO: 93.3 FL (ref 80–94)
MONOCYTES # BLD AUTO: 0.84 X10(3)/MCL (ref 0.1–1.3)
MONOCYTES NFR BLD AUTO: 7.9 %
NEUTROPHILS # BLD AUTO: 7.34 X10(3)/MCL (ref 2.1–9.2)
NEUTROPHILS NFR BLD AUTO: 68.8 %
NITRITE UR QL STRIP.AUTO: NEGATIVE
NRBC BLD AUTO-RTO: 0 %
PH UR STRIP.AUTO: 6.5 [PH]
PLATELET # BLD AUTO: 356 X10(3)/MCL (ref 130–400)
PMV BLD AUTO: 9.7 FL (ref 7.4–10.4)
POTASSIUM SERPL-SCNC: 5.2 MMOL/L (ref 3.5–5.1)
POTASSIUM SERPL-SCNC: 6.1 MMOL/L (ref 3.5–5.1)
PROT SERPL-MCNC: 6 GM/DL (ref 5.8–7.6)
PROT UR QL STRIP.AUTO: NEGATIVE MG/DL
RBC # BLD AUTO: 3.9 X10(6)/MCL (ref 4.2–5.4)
RBC #/AREA URNS AUTO: <5 /HPF
RBC UR QL AUTO: NEGATIVE UNIT/L
SARS-COV-2 RNA RESP QL NAA+PROBE: NOT DETECTED
SODIUM SERPL-SCNC: 142 MMOL/L (ref 132–146)
SODIUM SERPL-SCNC: 143 MMOL/L (ref 132–146)
SP GR UR STRIP.AUTO: 1.01 (ref 1–1.03)
SQUAMOUS #/AREA URNS AUTO: <5 /HPF
TROPONIN I SERPL-MCNC: 0.02 NG/ML (ref 0–0.04)
UROBILINOGEN UR STRIP-ACNC: 1 MG/DL
WBC # SPEC AUTO: 10.7 X10(3)/MCL (ref 4.5–11.5)
WBC #/AREA URNS AUTO: <5 /HPF

## 2023-03-09 PROCEDURE — 84484 ASSAY OF TROPONIN QUANT: CPT | Performed by: PHYSICIAN ASSISTANT

## 2023-03-09 PROCEDURE — 96365 THER/PROPH/DIAG IV INF INIT: CPT

## 2023-03-09 PROCEDURE — 93010 EKG 12-LEAD: ICD-10-PCS | Mod: ,,, | Performed by: INTERNAL MEDICINE

## 2023-03-09 PROCEDURE — 93005 ELECTROCARDIOGRAM TRACING: CPT

## 2023-03-09 PROCEDURE — 81001 URINALYSIS AUTO W/SCOPE: CPT | Performed by: PHYSICIAN ASSISTANT

## 2023-03-09 PROCEDURE — 93010 ELECTROCARDIOGRAM REPORT: CPT | Mod: ,,, | Performed by: INTERNAL MEDICINE

## 2023-03-09 PROCEDURE — 83735 ASSAY OF MAGNESIUM: CPT | Performed by: PHYSICIAN ASSISTANT

## 2023-03-09 PROCEDURE — 80053 COMPREHEN METABOLIC PANEL: CPT | Performed by: PHYSICIAN ASSISTANT

## 2023-03-09 PROCEDURE — 25000003 PHARM REV CODE 250: Performed by: EMERGENCY MEDICINE

## 2023-03-09 PROCEDURE — 96361 HYDRATE IV INFUSION ADD-ON: CPT

## 2023-03-09 PROCEDURE — 99285 EMERGENCY DEPT VISIT HI MDM: CPT | Mod: 25

## 2023-03-09 PROCEDURE — 0240U COVID/FLU A&B PCR: CPT | Performed by: PHYSICIAN ASSISTANT

## 2023-03-09 PROCEDURE — 85025 COMPLETE CBC W/AUTO DIFF WBC: CPT | Performed by: PHYSICIAN ASSISTANT

## 2023-03-09 RX ORDER — SODIUM CHLORIDE 9 MG/ML
500 INJECTION, SOLUTION INTRAVENOUS
Status: COMPLETED | OUTPATIENT
Start: 2023-03-09 | End: 2023-03-09

## 2023-03-09 RX ORDER — CALCIUM GLUCONATE 20 MG/ML
1 INJECTION, SOLUTION INTRAVENOUS
Status: COMPLETED | OUTPATIENT
Start: 2023-03-09 | End: 2023-03-09

## 2023-03-09 RX ADMIN — SODIUM CHLORIDE 500 ML: 9 INJECTION, SOLUTION INTRAVENOUS at 01:03

## 2023-03-09 RX ADMIN — CALCIUM GLUCONATE 1 G: 20 INJECTION, SOLUTION INTRAVENOUS at 01:03

## 2023-03-09 NOTE — FIRST PROVIDER EVALUATION
"Medical screening examination initiated.  I have conducted a focused provider triage encounter, findings are as follows:    Chief Complaint   Patient presents with    Fatigue     Pt reports weakness today, home health reports possible near syncope.      Brief history of present illness:  89 y.o. female presents to the ED via EMS with weakness onset today with near-syncope per  nurse.    Vitals:    03/09/23 1030   BP: (!) 142/70   Pulse: 71   Resp: 19   Temp: 98.1 °F (36.7 °C)   SpO2: 99%   Weight: 74.8 kg (165 lb)   Height: 5' 3" (1.6 m)     Pertinent physical exam:  Awake, alert, non-labored respirations    Brief workup plan:  labs, EKG, CT    Preliminary workup initiated; this workup will be continued and followed by the physician or advanced practice provider that is assigned to the patient when roomed.  "

## 2023-03-09 NOTE — ED PROVIDER NOTES
"Encounter Date: 3/9/2023    SCRIBE #1 NOTE: I, Rikki Christopher, am scribing for, and in the presence of,  Dr. Gagnon. I have scribed the following portions of the note - the EKG reading. Other sections scribed: HPI, ROS, Physical Exam, MDM, Attending.     History     Chief Complaint   Patient presents with    Fatigue     Pt reports weakness today, home health reports possible near syncope.      89 y/o AAF with history of HTN, HLD, DM, dementia and iron-deficiency anemia presents to ED for generalized weakness onset today.  Pt also reports some dizziness, and Home Health nurse reported possible near syncope along with lethargy compared to her baseline.  No trauma reported. She is GCS 14 at baseline.  Pt says she did not sleep well and is tired.  She says she has been eating and drinking well.  She denies associated CP, SOB.    The history is provided by the patient.   Fatigue  This is a new problem. Episode onset: today. The problem occurs constantly. The problem has not changed since onset.  Review of patient's allergies indicates:   Allergen Reactions    Lisinopril Other (See Comments)     "cough"       Past Medical History:   Diagnosis Date    Dementia 6/30/2022    See 6/30/2022 note    Depression 6/30/2022    Dysphagia 6/20/2022    mild-mod dysmotility 3/2021 esophagram    Gastroesophageal reflux disease 6/20/2022    Hyperlipidemia 6/20/2022    Hypertension 6/20/2022    MAGDY (iron deficiency anemia) 6/30/2022    MAGDY: FIT neg 2020, no prior EGD/colon, no weight loss or GI s/s, will monitor and consider but not strong candidate due to age and comorbidities, cont iron, Hb 11 on 5/2022 labs at [facility]    Insomnia 6/20/2022    Osteoarthritis of both knees 6/20/2022    Moderate DJD per 9/2021 knee x-rays    Osteopenia 6/20/2022    hip T -2.1 9/2021, declined treatment    Spondylosis of lumbar spine 6/20/2022    Type 2 diabetes mellitus 6/20/2022    Vitamin D deficiency 6/20/2022     Past Surgical History:   Procedure " Laterality Date    CYSTOSCOPY  2018     Family History   Family history unknown: Yes     Social History     Tobacco Use    Smoking status: Every Day     Packs/day: 0.25     Types: Cigarettes     Passive exposure: Never    Smokeless tobacco: Never    Tobacco comments:     pt refuses   Substance Use Topics    Alcohol use: Not Currently    Drug use: Never     Review of Systems   Unable to perform ROS: Dementia   Constitutional:  Positive for fatigue.     Physical Exam     Initial Vitals [03/09/23 1030]   BP Pulse Resp Temp SpO2   (!) 142/70 71 19 98.1 °F (36.7 °C) 99 %      MAP       --         Physical Exam    Nursing note and vitals reviewed.  Constitutional: She appears well-developed and well-nourished. She is not diaphoretic. No distress.   HENT:   Head: Normocephalic and atraumatic.   Dry lips   Eyes: Conjunctivae and EOM are normal. Pupils are equal, round, and reactive to light.   Neck: Neck supple.   Normal range of motion.  Cardiovascular:  Normal rate and regular rhythm.           Pulmonary/Chest: Breath sounds normal. No respiratory distress.   Abdominal: Abdomen is soft. Bowel sounds are normal. There is no abdominal tenderness.   Musculoskeletal:         General: No edema.      Cervical back: Normal range of motion and neck supple.      Lumbar back: Normal range of motion.      Comments: Moves all extremities     Neurological: She is alert.   Oriented to person and place, but not time  Face is symmetric.  Moving all extremities.   Skin: Skin is warm, dry and intact. Capillary refill takes less than 2 seconds.       ED Course   Procedures  Labs Reviewed   COMPREHENSIVE METABOLIC PANEL - Abnormal; Notable for the following components:       Result Value    Potassium Level 6.1 (*)     Glucose Level 145 (*)     Creatinine 1.37 (*)     Albumin Level 2.9 (*)     Albumin/Globulin Ratio 0.9 (*)     All other components within normal limits   URINALYSIS, REFLEX TO URINE CULTURE - Abnormal; Notable for the  following components:    Leukocyte Esterase, UA Trace (*)     All other components within normal limits   CBC WITH DIFFERENTIAL - Abnormal; Notable for the following components:    RBC 3.90 (*)     Hgb 10.9 (*)     Hct 36.4 (*)     MCHC 29.9 (*)     IG# 0.06 (*)     All other components within normal limits   BASIC METABOLIC PANEL - Abnormal; Notable for the following components:    Potassium Level 5.2 (*)     Glucose Level 137 (*)     Creatinine 1.22 (*)     All other components within normal limits   COVID/FLU A&B PCR - Normal    Narrative:     The Xpert Xpress SARS-CoV-2/FLU/RSV plus is a rapid, multiplexed real-time PCR test intended for the simultaneous qualitative detection and differentiation of SARS-CoV-2, Influenza A, Influenza B, and respiratory syncytial virus (RSV) viral RNA in either nasopharyngeal swab or nasal swab specimens.         TROPONIN I - Normal   MAGNESIUM - Normal   URINALYSIS, MICROSCOPIC - Normal   CBC W/ AUTO DIFFERENTIAL    Narrative:     The following orders were created for panel order CBC auto differential.  Procedure                               Abnormality         Status                     ---------                               -----------         ------                     CBC with Differential[[id number]]        Abnormal            Final result                 Please view results for these tests on the individual orders.     EKG Readings: (Independently Interpreted)   Initial Reading: No STEMI. Previous EKG: Compared with most recent EKG Rhythm: Normal Sinus Rhythm. Heart Rate: 70.   1031   ECG Results              EKG 12-lead (Final result)  Result time 03/09/23 13:02:30      Final result by Interface, Lab In [facility] (03/09/23 13:02:30)                   Narrative:    Test Reason : R53.1,    Vent. Rate : 070 BPM     Atrial Rate : 070 BPM     P-R Int : 146 ms          QRS Dur : 122 ms      QT Int : 480 ms       P-R-T Axes : 054 -45 057 degrees     QTc Int : 518 ms    Normal  sinus rhythm  Left axis deviation  Nonspecific intraventricular conduction delay  Minimal voltage criteria for LVH, may be normal variant ( Jermaine product )  Nonspecific ST abnormality  Abnormal ECG  Confirmed by Anurag Sifuentes MD ([id number]) on 3/9/2023 1:02:20 PM    Referred By: JACQUI   SELF           Confirmed By:Anurag Sifuentes MD                                  Imaging Results              X-Ray Chest AP Portable (Final result)  Result time 03/09/23 13:59:43      Final result by Valencia Dougherty MD (03/09/23 13:59:43)                   Impression:      No acute abnormality of the chest.      Electronically signed by: Valencia Dougherty  Date:    03/09/2023  Time:    13:59               Narrative:    EXAMINATION:  XR CHEST AP PORTABLE    CLINICAL HISTORY:  Other fatigue    COMPARISON:  Chest x-ray dated 01/04/2023    FINDINGS:  The heart is stable in size.  The lungs are clear without focal consolidation.  There is no pleural effusion or visible pneumothorax.                                       CT Head Without Contrast (Final result)  Result time 03/09/23 11:24:37      Final result by Nicholas Posada MD (03/09/23 11:24:37)                   Impression:      Chronic age-related changes.  No acute process      Electronically signed by: Nicholas Posada  Date:    03/09/2023  Time:    11:24               Narrative:    EXAMINATION:  CT HEAD WITHOUT CONTRAST    CLINICAL HISTORY:  Transient ischemic attack (TIA);    TECHNIQUE:  Multiple axial images were obtained from the base of the brain to the vertex without contrast administration.  Sagittal and coronal reconstructions were performed..Automatic exposure control is utilized to reduce patient radiation exposure.    COMPARISON:  MRI dated 02/17/2023    FINDINGS:  There is no intracranial mass or lesion seen.  No hemorrhage is seen.  No acute infarct is seen.  There is some cerebral atrophy seen.  There is some compensatory ventricular dilatation and  periventricular white matter changes consistent with the patient's age.  Calvarium is intact.  The posterior fossa is unremarkable..  The visualized portions of the paranasal sinuses show no acute abnormality.                                    X-Rays:   Independently Interpreted Readings:   Chest X-Ray: No infiltrates.   Head CT: No hemorrhage.   Medications   0.9%  NaCl infusion (0 mLs Intravenous Stopped 3/9/23 1500)   calcium gluconate 1 g in NS IVPB (premixed) (0 g Intravenous Stopped 3/9/23 1419)     Medical Decision Making:   Initial Assessment:   89 year old female with history of dementia presents for evaluation of generalized weakness, lethargy, possible near syncope prior to arrival.  She tells me she is tired because she has not been sleeping.  History is limited due to dementia.  Upon review of the medical chart, she appears to be at her baseline but not family is present to confirm this.  Screening labs with CT of the head obtained at triage to expedite care, only remarkable for mild hyperkalemia.  Her kidney function appears better than her baseline.  It may be hemolyzed, I will repeat this lab.  EKG largely unchanged from previous tracings.  I will give her a dose of calcium indicates her potassium is elevated.  I will also start a small bolus of IV fluids and add chest x-ray to assess for occult infection.  Urinalysis is pending.  Reassess  Differential Diagnosis:   Arrhythmia, anemia, dehydration, electrolyte abnormality, UTI, pneumonia and others  Independently Interpreted Test(s):   I have ordered and independently interpreted X-rays - see prior notes.  I have ordered and independently interpreted EKG Reading(s) - see prior notes  Clinical Tests:   Lab Tests: Ordered and Reviewed  Radiological Study: Ordered and Reviewed  Medical Tests: Ordered and Reviewed        Scribe Attestation:   Scribe #1: I performed the above scribed service and the documentation accurately describes the services I  performed. I attest to the accuracy of the note.    Attending Attestation:           Physician Attestation for Scribe:  Physician Attestation Statement for Scribe #1: I, reviewed documentation, as scribed by Rikki Christopher in my presence, and it is both accurate and complete.         Medical Decision Making  Differential diagnoses include, but are not limited to: UTI, pneumonia, dehydration, electrolyte abnormality, arrhythmia, anemia     Amount and/or Complexity of Data Reviewed  Labs: ordered.  Radiology: ordered.  ECG/medicine tests: ordered.            ED Course as of 03/11/23 1609   Thu Mar 09, 2023   1730 Reexamine.  Patient is resting comfortably, no acute complaints.  Repeat BMP drawn prior to Ca++ and IVF administration is high normal.  I do not feel further intervention is warranted at this time.  UA negative for infection. She does feel improved after IVF.  Per nursing staff, niece has been in the room, patient was awake and recognized her.  I attempted to contact the patient's number listed in the chart, no one answered.  I am attempting to contact family to evaluate whether she can go home or not.  I have not found a reason as of yet to stay in the hospital. [RB]   1810 Spoke to Jodi, patient's niece, who came to visit earlier. She states patient is at her baseline. She and a cousin further tell me patient hasn't walked in a year, had PT with home health for the first time today when she became weak and almost passed out. Patient was caught before falling to the ground. She does live with her  who cares for her with the help of family. Niece states she feels comfortable with discharge. She is tolerating PO. PCP follow-up and return precautions discussed. Awaiting a ride home.    [RB]   1836 Patient's niece is now at the bedside.  She again confirms the patient is at baseline and they do care for her at home, however she feels the may need nursing home she is not the medical decision maker for the  pain patient's .  I attempted to contact him earlier but he did not answer.  He is on his way now to discuss discharge to home versus admission for nursing home assessment. If he elects to take her home, she can be discharged with continued home health/PT and PCP follow-up.  [RB]      ED Course User Index  [RB] Misti Gagnon MD                   Clinical Impression:   Final diagnoses:  [R53.1] Weakness  [R53.83] Lethargy  [R53.1] Generalized weakness (Primary)        ED Disposition Condition    Discharge           ED Prescriptions    None       Follow-up Information       Follow up With Specialties Details Why Contact Info    Evangelina Garcia MD Family Medicine Schedule an appointment as soon as possible for a visit in 1 day  [address] [address]  [phone]      [facility] - Emergency Dept Emergency Medicine  As needed, If symptoms worsen [address] [place] [id number] [phone]             Misti Gagnon MD  03/11/23 9215

## 2023-03-13 RX ORDER — METFORMIN HYDROCHLORIDE 500 MG/1
500 TABLET, EXTENDED RELEASE ORAL 2 TIMES DAILY WITH MEALS
Qty: 180 TABLET | Refills: 1 | Status: SHIPPED | OUTPATIENT
Start: 2023-03-13

## 2023-03-14 ENCOUNTER — TELEPHONE (OUTPATIENT)
Dept: FAMILY MEDICINE | Facility: CLINIC | Age: 88
End: 2023-03-14

## 2023-03-14 NOTE — TELEPHONE ENCOUNTER
Dr Garcia,  left from [facility] with  stating that the patients blood sugar was 358 this am, she had eaten before being given her insulin after insulin blood sugar was 232.  Please advise

## 2023-03-16 ENCOUNTER — TELEPHONE (OUTPATIENT)
Dept: FAMILY MEDICINE | Facility: CLINIC | Age: 88
End: 2023-03-16
Payer: MEDICARE

## 2023-03-16 NOTE — TELEPHONE ENCOUNTER
Dr Garcia, I spoke to Mr Fajardo and he stated that he was giving Ms Martinez 30 units of insulin qam and 22 units qpm.  Please advise.

## 2023-03-21 ENCOUNTER — TELEPHONE (OUTPATIENT)
Dept: FAMILY MEDICINE | Facility: CLINIC | Age: 88
End: 2023-03-21
Payer: MEDICARE

## 2023-03-21 NOTE — TELEPHONE ENCOUNTER
Dr Garcia, I received a call from Chavez Bocanegra OT with [facility] and he informed me that  the patient during his home visit had a seizure episode and the  told him she is always catching them, he stated that she went to the ED last week and he thinks its her K+. I called the spouse Mr Fajardo and encouraged him to take her to go to the hospital for evaluation.  He stated that shes had that happen about 3 times but she's alright now, she's taking a nap.  I again encouraged him to take her to the ED, he stated that he will wait 30 minutes to see what happens and then hell take her.  I also called the next of kin Mr Howard but there was no answer, I did leave a message for him to call me back.  Please advice.

## 2023-03-24 ENCOUNTER — HOSPITAL ENCOUNTER (INPATIENT)
Facility: HOSPITAL | Age: 88
LOS: 12 days | Discharge: NURSING, REHAB OR LONG-TERM CARE | DRG: 177 | End: 2023-04-05
Attending: STUDENT IN AN ORGANIZED HEALTH CARE EDUCATION/TRAINING PROGRAM | Admitting: FAMILY MEDICINE
Payer: MEDICARE

## 2023-03-24 DIAGNOSIS — R53.1 WEAKNESS: ICD-10-CM

## 2023-03-24 DIAGNOSIS — J02.9 PHARYNGITIS, UNSPECIFIED ETIOLOGY: ICD-10-CM

## 2023-03-24 DIAGNOSIS — R05.1 ACUTE COUGH: ICD-10-CM

## 2023-03-24 DIAGNOSIS — E55.9 VITAMIN D DEFICIENCY: ICD-10-CM

## 2023-03-24 DIAGNOSIS — R79.89 ELEVATED TROPONIN: ICD-10-CM

## 2023-03-24 DIAGNOSIS — U07.1 COVID-19: ICD-10-CM

## 2023-03-24 DIAGNOSIS — I21.4 NSTEMI (NON-ST ELEVATED MYOCARDIAL INFARCTION): ICD-10-CM

## 2023-03-24 DIAGNOSIS — M47.816 SPONDYLOSIS OF LUMBAR SPINE: ICD-10-CM

## 2023-03-24 DIAGNOSIS — Z91.89 AT RISK FOR PROLONGED QT INTERVAL SYNDROME: ICD-10-CM

## 2023-03-24 DIAGNOSIS — Z79.4 TYPE 2 DIABETES MELLITUS WITH HYPERGLYCEMIA, WITH LONG-TERM CURRENT USE OF INSULIN: ICD-10-CM

## 2023-03-24 DIAGNOSIS — R53.83 FATIGUE, UNSPECIFIED TYPE: ICD-10-CM

## 2023-03-24 DIAGNOSIS — E11.65 TYPE 2 DIABETES MELLITUS WITH HYPERGLYCEMIA, WITH LONG-TERM CURRENT USE OF INSULIN: ICD-10-CM

## 2023-03-24 DIAGNOSIS — N18.9 CHRONIC KIDNEY DISEASE, UNSPECIFIED CKD STAGE: ICD-10-CM

## 2023-03-24 DIAGNOSIS — N39.0 URINARY TRACT INFECTION WITHOUT HEMATURIA, SITE UNSPECIFIED: Primary | ICD-10-CM

## 2023-03-24 DIAGNOSIS — F02.B0 MODERATE DEMENTIA ASSOCIATED WITH OTHER UNDERLYING DISEASE, WITHOUT BEHAVIORAL DISTURBANCE, PSYCHOTIC DISTURBANCE, MOOD DISTURBANCE, OR ANXIETY: ICD-10-CM

## 2023-03-24 LAB
ALBUMIN SERPL-MCNC: 3.2 G/DL (ref 3.4–4.8)
ALBUMIN/GLOB SERPL: 0.7 RATIO (ref 1.1–2)
ALP SERPL-CCNC: 96 UNIT/L (ref 40–150)
ALT SERPL-CCNC: 10 UNIT/L (ref 0–55)
APPEARANCE UR: CLEAR
APTT PPP: 29.8 SECONDS
AST SERPL-CCNC: 22 UNIT/L (ref 5–34)
BACTERIA #/AREA URNS AUTO: ABNORMAL /HPF
BASOPHILS # BLD AUTO: 0.03 X10(3)/MCL (ref 0–0.2)
BASOPHILS # BLD AUTO: 0.04 X10(3)/MCL (ref 0–0.2)
BASOPHILS NFR BLD AUTO: 0.4 %
BASOPHILS NFR BLD AUTO: 0.5 %
BILIRUB UR QL STRIP.AUTO: NEGATIVE MG/DL
BILIRUBIN DIRECT+TOT PNL SERPL-MCNC: 0.6 MG/DL
BNP BLD-MCNC: 334.5 PG/ML
BUN SERPL-MCNC: 19.4 MG/DL (ref 9.8–20.1)
CALCIUM SERPL-MCNC: 10.2 MG/DL (ref 8.4–10.2)
CHLORIDE SERPL-SCNC: 104 MMOL/L (ref 98–111)
CO2 SERPL-SCNC: 23 MMOL/L (ref 23–31)
COLOR UR AUTO: YELLOW
CREAT SERPL-MCNC: 1.62 MG/DL (ref 0.55–1.02)
EOSINOPHIL # BLD AUTO: 0.01 X10(3)/MCL (ref 0–0.9)
EOSINOPHIL # BLD AUTO: 0.03 X10(3)/MCL (ref 0–0.9)
EOSINOPHIL NFR BLD AUTO: 0.1 %
EOSINOPHIL NFR BLD AUTO: 0.4 %
ERYTHROCYTE [DISTWIDTH] IN BLOOD BY AUTOMATED COUNT: 13.9 % (ref 11.5–17)
ERYTHROCYTE [DISTWIDTH] IN BLOOD BY AUTOMATED COUNT: 14 % (ref 11.5–17)
EST. AVERAGE GLUCOSE BLD GHB EST-MCNC: 197.3 MG/DL
FLUAV AG UPPER RESP QL IA.RAPID: NOT DETECTED
FLUBV AG UPPER RESP QL IA.RAPID: NOT DETECTED
GFR SERPLBLD CREATININE-BSD FMLA CKD-EPI: 30 MLS/MIN/1.73/M2
GLOBULIN SER-MCNC: 4.4 GM/DL (ref 2.4–3.5)
GLUCOSE SERPL-MCNC: 138 MG/DL (ref 75–121)
GLUCOSE UR QL STRIP.AUTO: NORMAL MG/DL
HBA1C MFR BLD: 8.5 %
HCT VFR BLD AUTO: 37.1 % (ref 37–47)
HCT VFR BLD AUTO: 37.9 % (ref 37–47)
HGB BLD-MCNC: 11.5 G/DL (ref 12–16)
HGB BLD-MCNC: 11.9 G/DL (ref 12–16)
HYALINE CASTS #/AREA URNS LPF: ABNORMAL /LPF
IMM GRANULOCYTES # BLD AUTO: 0.03 X10(3)/MCL (ref 0–0.04)
IMM GRANULOCYTES # BLD AUTO: 0.04 X10(3)/MCL (ref 0–0.04)
IMM GRANULOCYTES NFR BLD AUTO: 0.4 %
IMM GRANULOCYTES NFR BLD AUTO: 0.5 %
KETONES UR QL STRIP.AUTO: ABNORMAL MG/DL
LEUKOCYTE ESTERASE UR QL STRIP.AUTO: 250 UNIT/L
LYMPHOCYTES # BLD AUTO: 1.7 X10(3)/MCL (ref 0.6–4.6)
LYMPHOCYTES # BLD AUTO: 1.94 X10(3)/MCL (ref 0.6–4.6)
LYMPHOCYTES NFR BLD AUTO: 20.7 %
LYMPHOCYTES NFR BLD AUTO: 24.7 %
MAGNESIUM SERPL-MCNC: 1.7 MG/DL (ref 1.6–2.6)
MCH RBC QN AUTO: 28.3 PG (ref 27–31)
MCH RBC QN AUTO: 28.7 PG (ref 27–31)
MCHC RBC AUTO-ENTMCNC: 31 G/DL (ref 33–36)
MCHC RBC AUTO-ENTMCNC: 31.4 G/DL (ref 33–36)
MCV RBC AUTO: 91.3 FL (ref 80–94)
MCV RBC AUTO: 91.4 FL (ref 80–94)
MONOCYTES # BLD AUTO: 1.27 X10(3)/MCL (ref 0.1–1.3)
MONOCYTES # BLD AUTO: 1.37 X10(3)/MCL (ref 0.1–1.3)
MONOCYTES NFR BLD AUTO: 15.4 %
MONOCYTES NFR BLD AUTO: 17.4 %
MUCOUS THREADS URNS QL MICRO: ABNORMAL /LPF
NEUTROPHILS # BLD AUTO: 4.48 X10(3)/MCL (ref 2.1–9.2)
NEUTROPHILS # BLD AUTO: 5.16 X10(3)/MCL (ref 2.1–9.2)
NEUTROPHILS NFR BLD AUTO: 56.9 %
NEUTROPHILS NFR BLD AUTO: 62.6 %
NITRITE UR QL STRIP.AUTO: NEGATIVE
NRBC BLD AUTO-RTO: 0 %
NRBC BLD AUTO-RTO: 0 %
PH UR STRIP.AUTO: 5.5 [PH]
PHOSPHATE SERPL-MCNC: 4.6 MG/DL (ref 2.3–4.7)
PLATELET # BLD AUTO: 309 X10(3)/MCL (ref 130–400)
PLATELET # BLD AUTO: 373 X10(3)/MCL (ref 130–400)
PMV BLD AUTO: 10.1 FL (ref 7.4–10.4)
PMV BLD AUTO: 9.8 FL (ref 7.4–10.4)
POCT GLUCOSE: 186 MG/DL (ref 70–110)
POTASSIUM SERPL-SCNC: 4.1 MMOL/L (ref 3.5–5.1)
PROT SERPL-MCNC: 7.6 GM/DL (ref 5.8–7.6)
PROT UR QL STRIP.AUTO: ABNORMAL MG/DL
RBC # BLD AUTO: 4.06 X10(6)/MCL (ref 4.2–5.4)
RBC # BLD AUTO: 4.15 X10(6)/MCL (ref 4.2–5.4)
RBC #/AREA URNS AUTO: ABNORMAL /HPF
RBC UR QL AUTO: NEGATIVE UNIT/L
RSV A 5' UTR RNA NPH QL NAA+PROBE: NOT DETECTED
SARS-COV-2 RNA RESP QL NAA+PROBE: DETECTED
SODIUM SERPL-SCNC: 140 MMOL/L (ref 132–146)
SP GR UR STRIP.AUTO: 1.02
SQUAMOUS #/AREA URNS LPF: ABNORMAL /HPF
T4 FREE SERPL-MCNC: 1.25 NG/DL (ref 0.7–1.48)
TROPONIN I SERPL-MCNC: 0.23 NG/ML (ref 0–0.04)
TSH SERPL-ACNC: 1.44 UIU/ML (ref 0.35–4.94)
UROBILINOGEN UR STRIP-ACNC: ABNORMAL MG/DL
WBC # SPEC AUTO: 7.9 X10(3)/MCL (ref 4.5–11.5)
WBC # SPEC AUTO: 8.2 X10(3)/MCL (ref 4.5–11.5)
WBC #/AREA URNS AUTO: ABNORMAL /HPF

## 2023-03-24 PROCEDURE — 83880 ASSAY OF NATRIURETIC PEPTIDE: CPT | Performed by: STUDENT IN AN ORGANIZED HEALTH CARE EDUCATION/TRAINING PROGRAM

## 2023-03-24 PROCEDURE — 27000207 HC ISOLATION

## 2023-03-24 PROCEDURE — 87077 CULTURE AEROBIC IDENTIFY: CPT | Performed by: STUDENT IN AN ORGANIZED HEALTH CARE EDUCATION/TRAINING PROGRAM

## 2023-03-24 PROCEDURE — 93005 ELECTROCARDIOGRAM TRACING: CPT

## 2023-03-24 PROCEDURE — 63600175 PHARM REV CODE 636 W HCPCS: Performed by: NURSE PRACTITIONER

## 2023-03-24 PROCEDURE — 25000003 PHARM REV CODE 250: Performed by: STUDENT IN AN ORGANIZED HEALTH CARE EDUCATION/TRAINING PROGRAM

## 2023-03-24 PROCEDURE — 81001 URINALYSIS AUTO W/SCOPE: CPT | Performed by: STUDENT IN AN ORGANIZED HEALTH CARE EDUCATION/TRAINING PROGRAM

## 2023-03-24 PROCEDURE — 87040 BLOOD CULTURE FOR BACTERIA: CPT | Performed by: STUDENT IN AN ORGANIZED HEALTH CARE EDUCATION/TRAINING PROGRAM

## 2023-03-24 PROCEDURE — 83036 HEMOGLOBIN GLYCOSYLATED A1C: CPT | Performed by: NURSE PRACTITIONER

## 2023-03-24 PROCEDURE — 80053 COMPREHEN METABOLIC PANEL: CPT | Performed by: STUDENT IN AN ORGANIZED HEALTH CARE EDUCATION/TRAINING PROGRAM

## 2023-03-24 PROCEDURE — 85730 THROMBOPLASTIN TIME PARTIAL: CPT | Performed by: NURSE PRACTITIONER

## 2023-03-24 PROCEDURE — 96365 THER/PROPH/DIAG IV INF INIT: CPT

## 2023-03-24 PROCEDURE — 84100 ASSAY OF PHOSPHORUS: CPT | Performed by: STUDENT IN AN ORGANIZED HEALTH CARE EDUCATION/TRAINING PROGRAM

## 2023-03-24 PROCEDURE — 83735 ASSAY OF MAGNESIUM: CPT | Performed by: STUDENT IN AN ORGANIZED HEALTH CARE EDUCATION/TRAINING PROGRAM

## 2023-03-24 PROCEDURE — 63600175 PHARM REV CODE 636 W HCPCS: Performed by: STUDENT IN AN ORGANIZED HEALTH CARE EDUCATION/TRAINING PROGRAM

## 2023-03-24 PROCEDURE — 25000003 PHARM REV CODE 250: Performed by: NURSE PRACTITIONER

## 2023-03-24 PROCEDURE — 85025 COMPLETE CBC W/AUTO DIFF WBC: CPT | Performed by: STUDENT IN AN ORGANIZED HEALTH CARE EDUCATION/TRAINING PROGRAM

## 2023-03-24 PROCEDURE — 99285 EMERGENCY DEPT VISIT HI MDM: CPT | Mod: 25

## 2023-03-24 PROCEDURE — 84484 ASSAY OF TROPONIN QUANT: CPT | Performed by: NURSE PRACTITIONER

## 2023-03-24 PROCEDURE — 85025 COMPLETE CBC W/AUTO DIFF WBC: CPT | Performed by: NURSE PRACTITIONER

## 2023-03-24 PROCEDURE — 84484 ASSAY OF TROPONIN QUANT: CPT | Performed by: STUDENT IN AN ORGANIZED HEALTH CARE EDUCATION/TRAINING PROGRAM

## 2023-03-24 PROCEDURE — 21400001 HC TELEMETRY ROOM

## 2023-03-24 PROCEDURE — 85610 PROTHROMBIN TIME: CPT | Performed by: NURSE PRACTITIONER

## 2023-03-24 PROCEDURE — 0241U COVID/RSV/FLU A&B PCR: CPT | Performed by: STUDENT IN AN ORGANIZED HEALTH CARE EDUCATION/TRAINING PROGRAM

## 2023-03-24 PROCEDURE — 84439 ASSAY OF FREE THYROXINE: CPT | Performed by: NURSE PRACTITIONER

## 2023-03-24 PROCEDURE — 84443 ASSAY THYROID STIM HORMONE: CPT | Performed by: NURSE PRACTITIONER

## 2023-03-24 RX ORDER — NITROGLYCERIN 0.4 MG/1
0.4 TABLET SUBLINGUAL EVERY 5 MIN PRN
Status: DISCONTINUED | OUTPATIENT
Start: 2023-03-24 | End: 2023-04-05 | Stop reason: HOSPADM

## 2023-03-24 RX ORDER — INSULIN ASPART 100 [IU]/ML
0-5 INJECTION, SOLUTION INTRAVENOUS; SUBCUTANEOUS
Status: DISCONTINUED | OUTPATIENT
Start: 2023-03-24 | End: 2023-04-05 | Stop reason: HOSPADM

## 2023-03-24 RX ORDER — ATORVASTATIN CALCIUM 20 MG/1
20 TABLET, FILM COATED ORAL NIGHTLY
Status: DISCONTINUED | OUTPATIENT
Start: 2023-03-24 | End: 2023-04-05 | Stop reason: HOSPADM

## 2023-03-24 RX ORDER — MEMANTINE HYDROCHLORIDE 5 MG/1
5 TABLET ORAL 2 TIMES DAILY
Status: DISCONTINUED | OUTPATIENT
Start: 2023-03-24 | End: 2023-03-25

## 2023-03-24 RX ORDER — HEPARIN SODIUM 5000 [USP'U]/ML
5000 INJECTION, SOLUTION INTRAVENOUS; SUBCUTANEOUS EVERY 12 HOURS
Status: DISCONTINUED | OUTPATIENT
Start: 2023-03-24 | End: 2023-03-24

## 2023-03-24 RX ORDER — DEXTROSE 40 %
15 GEL (GRAM) ORAL
Status: DISCONTINUED | OUTPATIENT
Start: 2023-03-24 | End: 2023-04-05 | Stop reason: HOSPADM

## 2023-03-24 RX ORDER — PANTOPRAZOLE SODIUM 40 MG/10ML
40 INJECTION, POWDER, LYOPHILIZED, FOR SOLUTION INTRAVENOUS EVERY 24 HOURS
Status: DISCONTINUED | OUTPATIENT
Start: 2023-03-25 | End: 2023-03-28

## 2023-03-24 RX ORDER — MECLIZINE HYDROCHLORIDE 25 MG/1
50 TABLET ORAL
Status: COMPLETED | OUTPATIENT
Start: 2023-03-24 | End: 2023-03-24

## 2023-03-24 RX ORDER — HEPARIN SODIUM,PORCINE/D5W 25000/250
0-40 INTRAVENOUS SOLUTION INTRAVENOUS CONTINUOUS
Status: DISCONTINUED | OUTPATIENT
Start: 2023-03-24 | End: 2023-03-25

## 2023-03-24 RX ORDER — GUAIFENESIN/DEXTROMETHORPHAN 100-10MG/5
10 SYRUP ORAL EVERY 4 HOURS PRN
Status: DISCONTINUED | OUTPATIENT
Start: 2023-03-25 | End: 2023-03-26

## 2023-03-24 RX ORDER — ASPIRIN 325 MG
325 TABLET ORAL
Status: COMPLETED | OUTPATIENT
Start: 2023-03-24 | End: 2023-03-24

## 2023-03-24 RX ORDER — GLUCAGON 1 MG
1 KIT INJECTION
Status: DISCONTINUED | OUTPATIENT
Start: 2023-03-24 | End: 2023-04-05 | Stop reason: HOSPADM

## 2023-03-24 RX ORDER — SODIUM CHLORIDE 0.9 % (FLUSH) 0.9 %
10 SYRINGE (ML) INJECTION EVERY 12 HOURS PRN
Status: DISCONTINUED | OUTPATIENT
Start: 2023-03-24 | End: 2023-04-05 | Stop reason: HOSPADM

## 2023-03-24 RX ORDER — SODIUM CHLORIDE, SODIUM LACTATE, POTASSIUM CHLORIDE, CALCIUM CHLORIDE 600; 310; 30; 20 MG/100ML; MG/100ML; MG/100ML; MG/100ML
INJECTION, SOLUTION INTRAVENOUS CONTINUOUS
Status: ACTIVE | OUTPATIENT
Start: 2023-03-24 | End: 2023-03-25

## 2023-03-24 RX ORDER — DEXTROSE 40 %
30 GEL (GRAM) ORAL
Status: DISCONTINUED | OUTPATIENT
Start: 2023-03-24 | End: 2023-04-05 | Stop reason: HOSPADM

## 2023-03-24 RX ORDER — METOPROLOL TARTRATE 50 MG/1
50 TABLET ORAL 2 TIMES DAILY
Status: DISCONTINUED | OUTPATIENT
Start: 2023-03-24 | End: 2023-04-05 | Stop reason: HOSPADM

## 2023-03-24 RX ORDER — ASPIRIN 81 MG/1
81 TABLET ORAL DAILY
Status: DISCONTINUED | OUTPATIENT
Start: 2023-03-25 | End: 2023-04-05 | Stop reason: HOSPADM

## 2023-03-24 RX ORDER — NALOXONE HCL 0.4 MG/ML
0.02 VIAL (ML) INJECTION
Status: DISCONTINUED | OUTPATIENT
Start: 2023-03-24 | End: 2023-04-05 | Stop reason: HOSPADM

## 2023-03-24 RX ADMIN — HEPARIN SODIUM 12 UNITS/KG/HR: 10000 INJECTION, SOLUTION INTRAVENOUS at 09:03

## 2023-03-24 RX ADMIN — MECLIZINE HYDROCHLORIDE 50 MG: 25 TABLET ORAL at 04:03

## 2023-03-24 RX ADMIN — MEMANTINE 5 MG: 5 TABLET ORAL at 11:03

## 2023-03-24 RX ADMIN — METOPROLOL TARTRATE 50 MG: 50 TABLET, FILM COATED ORAL at 11:03

## 2023-03-24 RX ADMIN — ATORVASTATIN CALCIUM 20 MG: 20 TABLET, FILM COATED ORAL at 11:03

## 2023-03-24 RX ADMIN — CEFTRIAXONE SODIUM 1 G: 1 INJECTION, POWDER, FOR SOLUTION INTRAMUSCULAR; INTRAVENOUS at 07:03

## 2023-03-24 RX ADMIN — ASPIRIN 325 MG ORAL TABLET 325 MG: 325 PILL ORAL at 06:03

## 2023-03-24 RX ADMIN — SODIUM CHLORIDE, POTASSIUM CHLORIDE, SODIUM LACTATE AND CALCIUM CHLORIDE: 600; 310; 30; 20 INJECTION, SOLUTION INTRAVENOUS at 09:03

## 2023-03-24 NOTE — Clinical Note
Diagnosis: Urinary tract infection without hematuria, site unspecified [[id number]]   Admitting Provider:: SHELLY JENSEN [[id number]]   Future Attending Provider: SHELLY JENSEN [[id number]]   Reason for IP Medical Treatment  (Clinical interventions that can only be accomplished in the IP setting? ) :: NSTEMI, COVI, ROXANNE   I certify that Inpatient services for greater than or equal to 2 midnights are medically necessary:: Yes   Plans for Post-Acute care--if anticipated (pick the single best option):: A. No post acute care anticipated at this time

## 2023-03-24 NOTE — ED PROVIDER NOTES
"Encounter Date: 3/24/2023       History     Chief Complaint   Patient presents with    Dizziness     Dizziness x 1 day     89-year-old female presents to ED for dizziness.  Patient has dementia.  Family concerned since the patient reported dizziness for one day.  Has home health who ultimately referred the patient to ER.  EMS states the patient had elevated fingerstick blood glucose at 240 otherwise vitals stable.  Mentation en route reported GCS 14.  No reported traumas, no fevers or chills.  Patient denies any headache, no dizziness at this time, no chest pain, no shortness of breath. states at baseline is unable to ambulate without assistance.  No urinary complaints, no other complaints or concerns at this time.  Reports being asymptomatic in department.    Review of patient's allergies indicates:   Allergen Reactions    Lisinopril Other (See Comments)     "cough"       Past Medical History:   Diagnosis Date    Dementia 6/30/2022    See 6/30/2022 note    Depression 6/30/2022    Dysphagia 6/20/2022    mild-mod dysmotility 3/2021 esophagram    Gastroesophageal reflux disease 6/20/2022    Hyperlipidemia 6/20/2022    Hypertension 6/20/2022    MAGDY (iron deficiency anemia) 6/30/2022    MAGDY: FIT neg 2020, no prior EGD/colon, no weight loss or GI s/s, will monitor and consider but not strong candidate due to age and comorbidities, cont iron, Hb 11 on 5/2022 labs at [facility] [place]    Insomnia 6/20/2022    Osteoarthritis of both knees 6/20/2022    Moderate DJD per 9/2021 knee x-rays    Osteopenia 6/20/2022    hip T -2.1 9/2021, declined treatment    Spondylosis of lumbar spine 6/20/2022    Type 2 diabetes mellitus 6/20/2022    Vitamin D deficiency 6/20/2022     Past Surgical History:   Procedure Laterality Date    CYSTOSCOPY  2018     Family History   Family history unknown: Yes     Social History     Tobacco Use    Smoking status: Every Day     Packs/day: 0.25     Types: Cigarettes     Passive exposure: Never    Smokeless tobacco: " Never    Tobacco comments:     pt refuses   Substance Use Topics    Alcohol use: Not Currently    Drug use: Never     Review of Systems   Constitutional:  Negative for chills, diaphoresis and fever.   HENT:  Negative for congestion, rhinorrhea, sinus pain and sore throat.    Eyes:  Negative for pain, discharge and itching.   Respiratory:  Negative for cough, chest tightness and shortness of breath.    Cardiovascular:  Negative for chest pain and palpitations.   Gastrointestinal:  Negative for abdominal pain, nausea and vomiting.   Genitourinary:  Negative for dysuria, flank pain and hematuria.   Musculoskeletal:  Negative for back pain and myalgias.   Skin:  Negative for color change and rash.   Neurological:  Positive for dizziness and weakness. Negative for headaches.   Psychiatric/Behavioral:  Negative for confusion. The patient is not hyperactive.      Physical Exam     Initial Vitals [03/24/23 1605]   BP Pulse Resp Temp SpO2   (!) 142/66 94 16 99 °F (37.2 °C) 96 %      MAP       --         Physical Exam    Vitals reviewed.  Constitutional: She appears well-developed and well-nourished. She is not diaphoretic. No distress.   HENT:   Head: Normocephalic and atraumatic.   Eyes: Conjunctivae and EOM are normal. Pupils are equal, round, and reactive to light.   Neck: Neck supple. No tracheal deviation present.   Normal range of motion.  Cardiovascular:  Normal rate, regular rhythm, normal heart sounds and intact distal pulses.           Pulmonary/Chest: Breath sounds normal. No respiratory distress.   Abdominal: Abdomen is soft. There is no abdominal tenderness. There is no rebound and no guarding.   Musculoskeletal:         General: Normal range of motion.      Cervical back: Normal range of motion and neck supple.     Neurological: She is alert. She displays no atrophy and no tremor. No cranial nerve deficit or sensory deficit. She exhibits normal muscle tone. She displays no seizure activity. GCS score is 15. GCS  eye subscore is 3. GCS verbal subscore is 5. GCS motor subscore is 6.   Alert and oriented x2, did not know year.  Generalized weakness appreciated bilaterally and equally +4/5 of all extremities.  No obvious cranial nerve defect.   Skin: Skin is warm and dry. Capillary refill takes less than 2 seconds. No rash noted.   Psychiatric: She has a normal mood and affect. Her behavior is normal. Judgment and thought content normal.       ED Course   Critical Care    Date/Time: 3/28/2023 2:13 PM  Performed by: Fly Gomez MD  Authorized by: Fly Gomez MD   Total critical care time (exclusive of procedural time) : 45 minutes  Critical care time was exclusive of separately billable procedures and treating other patients.  Critical care was time spent personally by me on the following activities: evaluation of patient's response to treatment, obtaining history from patient or surrogate, ordering and review of laboratory studies, pulse oximetry, review of old charts, development of treatment plan with patient or surrogate, examination of patient, ordering and performing treatments and interventions, ordering and review of radiographic studies and re-evaluation of patient's condition.      Labs Reviewed   COMPREHENSIVE METABOLIC PANEL - Abnormal; Notable for the following components:       Result Value    Glucose Level 138 (*)     Creatinine 1.62 (*)     Albumin Level 3.2 (*)     Globulin 4.4 (*)     Albumin/Globulin Ratio 0.7 (*)     All other components within normal limits   TROPONIN I - Abnormal; Notable for the following components:    Troponin-I 0.228 (*)     All other components within normal limits   B-TYPE NATRIURETIC PEPTIDE - Abnormal; Notable for the following components:    Natriuretic Peptide 334.5 (*)     All other components within normal limits   COVID/RSV/FLU A&B PCR - Abnormal; Notable for the following components:    SARS-CoV-2 PCR Detected (*)     All other components within normal limits    Narrative:      The Xpert Xpress SARS-CoV-2/FLU/RSV plus is a rapid, multiplexed real-time PCR test intended for the simultaneous qualitative detection and differentiation of SARS-CoV-2, Influenza A, Influenza B, and respiratory syncytial virus (RSV) viral RNA in either nasopharyngeal swab or nasal swab specimens.         URINALYSIS, REFLEX TO URINE CULTURE - Abnormal; Notable for the following components:    Protein, UA 1+ (*)     Ketones, UA Trace (*)     Urobilinogen, UA 1+ (*)     Leukocyte Esterase,  (*)     WBC, UA 21-50 (*)     Bacteria, UA Trace (*)     Squamous Epithelial Cells, UA Trace (*)     Mucous, UA Trace (*)     Hyaline Casts, UA 3-5 (*)     All other components within normal limits   CBC WITH DIFFERENTIAL - Abnormal; Notable for the following components:    RBC 4.15 (*)     Hgb 11.9 (*)     MCHC 31.4 (*)     All other components within normal limits   HEMOGLOBIN A1C - Abnormal; Notable for the following components:    Hemoglobin A1c 8.5 (*)     All other components within normal limits   CBC WITH DIFFERENTIAL - Abnormal; Notable for the following components:    RBC 4.06 (*)     Hgb 11.5 (*)     MCHC 31.0 (*)     Mono # 1.37 (*)     All other components within normal limits   MAGNESIUM - Normal   PHOSPHORUS - Normal   TSH - Normal   T4, FREE - Normal   APTT - Normal   CBC W/ AUTO DIFFERENTIAL    Narrative:     The following orders were created for panel order CBC auto differential.  Procedure                               Abnormality         Status                     ---------                               -----------         ------                     CBC with Differential[[id number]]        Abnormal            Final result                 Please view results for these tests on the individual orders.   PROTIME-INR   CBC W/ AUTO DIFFERENTIAL    Narrative:     The following orders were created for panel order CBC auto differential.  Procedure                               Abnormality         Status                      ---------                               -----------         ------                     CBC with Differential[[id number]]        Abnormal            Final result                 Please view results for these tests on the individual orders.   EXTRA TUBES    Narrative:     The following orders were created for panel order EXTRA TUBES.  Procedure                               Abnormality         Status                     ---------                               -----------         ------                     Light Blue Top Hold[[id number]]                              In process                 Gold Top Hold[[id number]]                                    In process                 Pink Top Hold[[id number]]                                    In process                   Please view results for these tests on the individual orders.   LIGHT BLUE TOP HOLD   GOLD TOP HOLD   PINK TOP HOLD   POCT GLUCOSE, HAND-HELD DEVICE     EKG Readings: (Independently Interpreted)   Initial Reading: No STEMI. Rhythm: Normal Sinus Rhythm. Ectopy: No Ectopy. Conduction: Normal. Axis: Left Axis Deviation.   ECG Results              EKG 12-lead (Final result)  Result time 03/27/23 08:59:45      Final result by Interface, Lab In [facility] (03/27/23 08:59:45)                   Narrative:    Test Reason : I21.4,    Vent. Rate : 091 BPM     Atrial Rate : 091 BPM     P-R Int : 132 ms          QRS Dur : 122 ms      QT Int : 422 ms       P-R-T Axes : 065 -44 100 degrees     QTc Int : 519 ms    Normal sinus rhythm  Possible Left atrial enlargement  Left axis deviation  Nonspecific intraventricular conduction delay  Nonspecific ST and T wave abnormality  Abnormal ECG  When compared with ECG of 24-MAR-2023 16:24,  Premature atrial complexes are no longer Present  Confirmed by Wan Vega MD ([id number]) on 3/27/2023 8:59:33 AM    Referred By: [email or web address]   SELF           Confirmed By:Wan Vega MD                                     EKG 12-lead  (Final result)  Result time 03/27/23 08:53:22      Final result by Interface, Lab In [facility] (03/27/23 08:53:22)                   Narrative:    Test Reason : R07.9,    Vent. Rate : 094 BPM     Atrial Rate : 094 BPM     P-R Int : 128 ms          QRS Dur : 122 ms      QT Int : 416 ms       P-R-T Axes : 069 -43 094 degrees     QTc Int : 520 ms    Sinus rhythm with Premature atrial complexes  Left axis deviation  Septal infarct ,age undetermined  Abnormal ECG  When compared with ECG of 09-MAR-2023 10:31,  Premature atrial complexes are now Present  Nonspecific T wave abnormality no longer evident in Inferior leads  Confirmed by Wan Vega MD ([id number]) on 3/27/2023 8:53:07 AM    Referred By: [email or web address]   SELF           Confirmed By:Wan Vega MD                                  Imaging Results              CT Head Without Contrast (Final result)  Result time 03/24/23 17:38:46      Final result by Jean Paul Alfonso MD (03/24/23 17:38:46)                   Impression:      No acute intracranial findings identified.      Electronically signed by: Jean Paul Alfonso  Date:    03/24/2023  Time:    17:38               Narrative:    EXAMINATION:  CT HEAD WITHOUT CONTRAST    CLINICAL HISTORY:  Dizziness, persistent/recurrent, cardiac or vascular cause suspected;    TECHNIQUE:  Sequential axial images were performed of the brain without contrast.    Dose product length of 946 mGycm. Automated exposure control was utilized to minimize radiation dose.    COMPARISON:  January 4, 2023..    FINDINGS:  There is no intracranial mass effect, midline shift, hydrocephalus or hemorrhage. There is no sulcal effacement or low attenuation changes to suggest recent large vessel territory infarction. Chronic similar appearing periventricular and subcortical white matter low attenuation changes are present and are consistent with chronic microangiopathic ischemia. The ventricular system and sulcal markings prominence is consistent with atrophy.  There is no acute extra axial fluid collection.  There is mild mucoperiosteal thickening of the left maxillary sinus.  Otherwise, visualized paranasal sinuses are clear without mucosal thickening, polypoidal abnormality or air-fluid levels. Mastoid air cells aeration is optimal.                                       X-Ray Chest AP Portable (Final result)  Result time 03/24/23 17:04:48      Final result by Jean Paul Alfonso MD (03/24/23 17:04:48)                   Impression:      NO ACUTE CARDIOPULMONARY PROCESS IDENTIFIED.      Electronically signed by: Jean Paul Alfonso  Date:    03/24/2023  Time:    17:04               Narrative:    EXAMINATION:  XR CHEST AP PORTABLE    CLINICAL HISTORY:  Chest Pain;    TECHNIQUE:  One    COMPARISON:  March 9, 2023.    FINDINGS:  Cardiopericardial silhouette is within normal limits. Lungs are without dense focal or segmental consolidation, congestive process, pleural effusions or pneumothorax.                                       Medications   sodium chloride 0.9% flush 10 mL (has no administration in time range)   naloxone 0.4 mg/mL injection 0.02 mg (has no administration in time range)   dextrose 50% injection 12.5 g (has no administration in time range)   dextrose 50% injection 25 g (has no administration in time range)   glucagon (human recombinant) injection 1 mg (has no administration in time range)   dextrose 10% bolus 125 mL 125 mL (has no administration in time range)   dextrose 10% bolus 250 mL 250 mL (has no administration in time range)   dextrose 40 % gel 15,000 mg (has no administration in time range)   dextrose 40 % gel 30,000 mg (has no administration in time range)   insulin aspart U-100 injection 0-5 Units (5 Units Subcutaneous Given 3/28/23 1129)   lactated ringers infusion ( Intravenous New Bag 3/25/23 2103)   nitroGLYCERIN SL tablet 0.4 mg (has no administration in time range)   aspirin EC tablet 81 mg (81 mg Oral Given 3/28/23 0906)   metoprolol tartrate  (LOPRESSOR) tablet 50 mg (50 mg Oral Given 3/28/23 0906)   atorvastatin tablet 20 mg (20 mg Oral Given 3/27/23 2118)   hydrALAZINE injection 5 mg (5 mg Intravenous Given 3/27/23 0530)   ferrous sulfate tablet 1 each (1 each Oral Given 3/27/23 0948)   mupirocin 2 % ointment ( Nasal Given 3/28/23 0912)   amLODIPine tablet 5 mg (5 mg Oral Given 3/28/23 0906)   heparin (porcine) injection 5,000 Units (5,000 Units Subcutaneous Given 3/28/23 0908)   dextromethorphan-guaiFENesin  mg/5 ml liquid 10 mL (10 mLs Oral Given 3/28/23 1129)   memantine tablet 5 mg (5 mg Oral Given 3/28/23 0906)   acetaminophen tablet 650 mg (650 mg Oral Given 3/27/23 0948)   insulin detemir U-100 injection 22 Units (22 Units Subcutaneous Given 3/27/23 2116)   melatonin tablet 9 mg (9 mg Oral Given 3/28/23 0306)   insulin detemir U-100 injection 20 Units (20 Units Subcutaneous Given 3/28/23 0909)   pantoprazole EC tablet 40 mg (40 mg Oral Given 3/28/23 0912)   haloperidoL tablet 1 mg (has no administration in time range)   meclizine tablet 50 mg (50 mg Oral Given 3/24/23 1627)   cefTRIAXone (ROCEPHIN) 1 g in dextrose 5 % in water (D5W) 5 % 50 mL IVPB (MB+) (0 g Intravenous Stopped 3/24/23 2009)   aspirin tablet 325 mg (325 mg Oral Given 3/24/23 1831)   heparin 25,000 units in dextrose 5% (100 units/ml) IV bolus from bag INITIAL BOLUS (max bolus 4000 units) (4,000 Units Intravenous Bolus from Bag 3/24/23 2106)   magnesium sulfate in dextrose IVPB (premix) 1 g (0 g Intravenous Stopped 3/26/23 0939)     Medical Decision Making:   History:   Old Medical Records: I decided to obtain old medical records.  Initial Assessment:   Dizziness reported for 1 day.  Patient has no complaints.  Differential Diagnosis:   Stroke  Peripheral dizziness  Electrolyte derangement  Intracranial hemorrhage  Encephalitis  Meningitis  UTI  Myocardial ischemia  Clinical Tests:   Lab Tests: Reviewed and Ordered  Radiological Study: Ordered and Reviewed  Medical Tests:  Reviewed and Ordered  ED Management:  Patient in no acute distress.  Resting comfortably.  Was intermittently between GCS 14 and 15.  Patient answered all questions appropriately but was confused to year. Pt hard of hearing. No obvious neuro deficits otherwise.  generally weak and reported at baseline per EMS with mentation / strength.  Head CT negative and chest x-ray clear.  No oxygen requirement.  Laboratory analysis had multiple concerning findings including obvious urinary tract infection so antibiotics prescribed.  Tested COVID positive.  Had elevated troponin.  No acute EKG changes.  Did not meet Sgarbossa criteria given intraventricular block.  No chest pain and no dizziness.  Ultimately given patient's age and elevated risk factors requires admission for continued to antibiotics, further workup and management.  Medicine notified and care transitioned.  Significant time spent in isolation managing this patient's care. No family/friends bedside. (Patricia)                        Clinical Impression:   Final diagnoses:  [N39.0] Urinary tract infection without hematuria, site unspecified (Primary)  [U07.1] COVID-19  [N18.9] Chronic kidney disease, unspecified CKD stage  [I21.4] NSTEMI (non-ST elevated myocardial infarction)        ED Disposition Condition    Admit Stable                Fly Gomez MD  03/28/23 2846

## 2023-03-25 LAB
ALBUMIN SERPL-MCNC: 2.5 G/DL (ref 3.4–4.8)
ALBUMIN/GLOB SERPL: 0.7 RATIO (ref 1.1–2)
ALP SERPL-CCNC: 73 UNIT/L (ref 40–150)
ALT SERPL-CCNC: 8 UNIT/L (ref 0–55)
APTT PPP: 131.6 SECONDS
APTT PPP: 34.3 SECONDS
AST SERPL-CCNC: 23 UNIT/L (ref 5–34)
BASOPHILS # BLD AUTO: 0.04 X10(3)/MCL (ref 0–0.2)
BASOPHILS NFR BLD AUTO: 0.4 %
BILIRUBIN DIRECT+TOT PNL SERPL-MCNC: 0.4 MG/DL
BUN SERPL-MCNC: 23.5 MG/DL (ref 9.8–20.1)
CALCIUM SERPL-MCNC: 9.2 MG/DL (ref 8.4–10.2)
CHLORIDE SERPL-SCNC: 104 MMOL/L (ref 98–111)
CO2 SERPL-SCNC: 20 MMOL/L (ref 23–31)
CREAT SERPL-MCNC: 1.47 MG/DL (ref 0.55–1.02)
EOSINOPHIL # BLD AUTO: 0.02 X10(3)/MCL (ref 0–0.9)
EOSINOPHIL NFR BLD AUTO: 0.2 %
ERYTHROCYTE [DISTWIDTH] IN BLOOD BY AUTOMATED COUNT: 14.1 % (ref 11.5–17)
GFR SERPLBLD CREATININE-BSD FMLA CKD-EPI: 33 MLS/MIN/1.73/M2
GLOBULIN SER-MCNC: 3.6 GM/DL (ref 2.4–3.5)
GLUCOSE SERPL-MCNC: 208 MG/DL (ref 75–121)
HCT VFR BLD AUTO: 33.5 % (ref 37–47)
HGB BLD-MCNC: 10.5 G/DL (ref 12–16)
IMM GRANULOCYTES # BLD AUTO: 0.05 X10(3)/MCL (ref 0–0.04)
IMM GRANULOCYTES NFR BLD AUTO: 0.5 %
LYMPHOCYTES # BLD AUTO: 2.1 X10(3)/MCL (ref 0.6–4.6)
LYMPHOCYTES NFR BLD AUTO: 20.6 %
MAGNESIUM SERPL-MCNC: 1.7 MG/DL (ref 1.6–2.6)
MCH RBC QN AUTO: 28 PG (ref 27–31)
MCHC RBC AUTO-ENTMCNC: 31.3 G/DL (ref 33–36)
MCV RBC AUTO: 89.3 FL (ref 80–94)
MONOCYTES # BLD AUTO: 1.28 X10(3)/MCL (ref 0.1–1.3)
MONOCYTES NFR BLD AUTO: 12.5 %
NEUTROPHILS # BLD AUTO: 6.72 X10(3)/MCL (ref 2.1–9.2)
NEUTROPHILS NFR BLD AUTO: 65.8 %
NRBC BLD AUTO-RTO: 0 %
PHOSPHATE SERPL-MCNC: 4.4 MG/DL (ref 2.3–4.7)
PLATELET # BLD AUTO: 334 X10(3)/MCL (ref 130–400)
PMV BLD AUTO: 10.1 FL (ref 7.4–10.4)
POCT GLUCOSE: 177 MG/DL (ref 70–110)
POCT GLUCOSE: 181 MG/DL (ref 70–110)
POCT GLUCOSE: 208 MG/DL (ref 70–110)
POCT GLUCOSE: 233 MG/DL (ref 70–110)
POCT GLUCOSE: 240 MG/DL (ref 70–110)
POTASSIUM SERPL-SCNC: 4.3 MMOL/L (ref 3.5–5.1)
PROT SERPL-MCNC: 6.1 GM/DL (ref 5.8–7.6)
RBC # BLD AUTO: 3.75 X10(6)/MCL (ref 4.2–5.4)
SODIUM SERPL-SCNC: 138 MMOL/L (ref 132–146)
STREP A PCR (OHS): NOT DETECTED
TROPONIN I SERPL-MCNC: 0.13 NG/ML (ref 0–0.04)
TROPONIN I SERPL-MCNC: 0.16 NG/ML (ref 0–0.04)
TROPONIN I SERPL-MCNC: 0.17 NG/ML (ref 0–0.04)
TROPONIN I SERPL-MCNC: 0.21 NG/ML (ref 0–0.04)
WBC # SPEC AUTO: 10.2 X10(3)/MCL (ref 4.5–11.5)

## 2023-03-25 PROCEDURE — 93005 ELECTROCARDIOGRAM TRACING: CPT

## 2023-03-25 PROCEDURE — 80053 COMPREHEN METABOLIC PANEL: CPT | Performed by: NURSE PRACTITIONER

## 2023-03-25 PROCEDURE — 51702 INSERT TEMP BLADDER CATH: CPT

## 2023-03-25 PROCEDURE — 27000207 HC ISOLATION

## 2023-03-25 PROCEDURE — 85730 THROMBOPLASTIN TIME PARTIAL: CPT | Performed by: NURSE PRACTITIONER

## 2023-03-25 PROCEDURE — 85730 THROMBOPLASTIN TIME PARTIAL: CPT | Performed by: FAMILY MEDICINE

## 2023-03-25 PROCEDURE — 63600175 PHARM REV CODE 636 W HCPCS: Performed by: NURSE PRACTITIONER

## 2023-03-25 PROCEDURE — 87651 STREP A DNA AMP PROBE: CPT | Performed by: NURSE PRACTITIONER

## 2023-03-25 PROCEDURE — 84484 ASSAY OF TROPONIN QUANT: CPT | Performed by: NURSE PRACTITIONER

## 2023-03-25 PROCEDURE — 85025 COMPLETE CBC W/AUTO DIFF WBC: CPT | Performed by: NURSE PRACTITIONER

## 2023-03-25 PROCEDURE — 94761 N-INVAS EAR/PLS OXIMETRY MLT: CPT

## 2023-03-25 PROCEDURE — 25000003 PHARM REV CODE 250: Performed by: FAMILY MEDICINE

## 2023-03-25 PROCEDURE — 63600175 PHARM REV CODE 636 W HCPCS

## 2023-03-25 PROCEDURE — C9113 INJ PANTOPRAZOLE SODIUM, VIA: HCPCS | Performed by: NURSE PRACTITIONER

## 2023-03-25 PROCEDURE — 25000003 PHARM REV CODE 250: Performed by: NURSE PRACTITIONER

## 2023-03-25 PROCEDURE — 21400001 HC TELEMETRY ROOM

## 2023-03-25 PROCEDURE — 84100 ASSAY OF PHOSPHORUS: CPT | Performed by: NURSE PRACTITIONER

## 2023-03-25 PROCEDURE — 83735 ASSAY OF MAGNESIUM: CPT | Performed by: NURSE PRACTITIONER

## 2023-03-25 RX ORDER — MUPIROCIN 20 MG/G
OINTMENT TOPICAL 2 TIMES DAILY
Status: COMPLETED | OUTPATIENT
Start: 2023-03-25 | End: 2023-03-29

## 2023-03-25 RX ORDER — HEPARIN SODIUM 5000 [USP'U]/ML
5000 INJECTION, SOLUTION INTRAVENOUS; SUBCUTANEOUS EVERY 12 HOURS
Status: DISCONTINUED | OUTPATIENT
Start: 2023-03-25 | End: 2023-04-05 | Stop reason: HOSPADM

## 2023-03-25 RX ORDER — AMLODIPINE BESYLATE 5 MG/1
5 TABLET ORAL DAILY
Status: DISCONTINUED | OUTPATIENT
Start: 2023-03-25 | End: 2023-03-25

## 2023-03-25 RX ORDER — AMLODIPINE BESYLATE 5 MG/1
5 TABLET ORAL DAILY
Status: DISCONTINUED | OUTPATIENT
Start: 2023-03-25 | End: 2023-04-01

## 2023-03-25 RX ORDER — HYDRALAZINE HYDROCHLORIDE 20 MG/ML
5 INJECTION INTRAMUSCULAR; INTRAVENOUS
Status: DISCONTINUED | OUTPATIENT
Start: 2023-03-25 | End: 2023-04-05 | Stop reason: HOSPADM

## 2023-03-25 RX ORDER — LANOLIN ALCOHOL/MO/W.PET/CERES
1 CREAM (GRAM) TOPICAL EVERY OTHER DAY
Status: DISCONTINUED | OUTPATIENT
Start: 2023-03-25 | End: 2023-04-05 | Stop reason: HOSPADM

## 2023-03-25 RX ADMIN — SODIUM CHLORIDE, POTASSIUM CHLORIDE, SODIUM LACTATE AND CALCIUM CHLORIDE: 600; 310; 30; 20 INJECTION, SOLUTION INTRAVENOUS at 09:03

## 2023-03-25 RX ADMIN — MUPIROCIN: 20 OINTMENT TOPICAL at 09:03

## 2023-03-25 RX ADMIN — METOPROLOL TARTRATE 50 MG: 50 TABLET, FILM COATED ORAL at 09:03

## 2023-03-25 RX ADMIN — CEFTRIAXONE SODIUM 1 G: 1 INJECTION, POWDER, FOR SOLUTION INTRAMUSCULAR; INTRAVENOUS at 09:03

## 2023-03-25 RX ADMIN — FERROUS SULFATE TAB EC 325 MG (65 MG FE EQUIVALENT) 1 EACH: 325 (65 FE) TABLET DELAYED RESPONSE at 10:03

## 2023-03-25 RX ADMIN — HEPARIN SODIUM 5000 UNITS: 5000 INJECTION INTRAVENOUS; SUBCUTANEOUS at 09:03

## 2023-03-25 RX ADMIN — ATORVASTATIN CALCIUM 20 MG: 20 TABLET, FILM COATED ORAL at 09:03

## 2023-03-25 RX ADMIN — INSULIN DETEMIR 10 UNITS: 100 INJECTION, SOLUTION SUBCUTANEOUS at 09:03

## 2023-03-25 RX ADMIN — INSULIN ASPART 2 UNITS: 100 INJECTION, SOLUTION INTRAVENOUS; SUBCUTANEOUS at 06:03

## 2023-03-25 RX ADMIN — HYDRALAZINE HYDROCHLORIDE 5 MG: 20 INJECTION INTRAMUSCULAR; INTRAVENOUS at 12:03

## 2023-03-25 RX ADMIN — INSULIN ASPART 2 UNITS: 100 INJECTION, SOLUTION INTRAVENOUS; SUBCUTANEOUS at 12:03

## 2023-03-25 RX ADMIN — PANTOPRAZOLE SODIUM 40 MG: 40 INJECTION, POWDER, FOR SOLUTION INTRAVENOUS at 08:03

## 2023-03-25 RX ADMIN — AMLODIPINE BESYLATE 5 MG: 5 TABLET ORAL at 09:03

## 2023-03-25 RX ADMIN — ASPIRIN 81 MG: 81 TABLET, COATED ORAL at 09:03

## 2023-03-25 RX ADMIN — SODIUM CHLORIDE, POTASSIUM CHLORIDE, SODIUM LACTATE AND CALCIUM CHLORIDE: 600; 310; 30; 20 INJECTION, SOLUTION INTRAVENOUS at 12:03

## 2023-03-25 NOTE — H&P
[facility] Medicine Wards History & Physical Note     Resident Team: [facility] Family medicine  Attending Physician: Ricardo Gonzalez, *  Resident: Lucille Rachel MD, Nivia Hemphill MD  Intern: Reji Mansfield MD     Date of Admit: 3/24/2023    Chief Complaint     Dizziness (Dizziness x 5 days)      Subjective:      History of Present Illness:  Michelle Saenz is a 89 y.o.  female with a history of dementia, HTN, HLD, MAGDY, DMII, CKD stage 3, B/L knee OA brought to the ED by EMS on 3/24/2023  with a primary complaint of Dizziness. Spoke with pt  over the phone who reported that pt has been dizzy for about 5 days with decreased PO intake. Pt looks sleepy during my encounter with waxes and wanes in mentation which her  confirmed to be her baseline. Had GCS of 14 and fingerstick CBG of 240 in route to the ED. She lives with  and has HH. She is wheelchair bound at home and ambulates with assistance.  reports no falls or trauma. Pt denies chest pain, SOB, abdominal pain.    Upon arrival to the ED, VSS; satturated at 96% on RA. Laboratory results reveals H/H of 11.9/31.9 which is abound baseline, Cr 1.62, trop 0.2228, .5. UA with WBC, LE and trace bacteria. Tested positive for COVID. CT head and CXR negative for any abnormality. EKG with NSR and PAC. Family medicine team consulted for admission for NSTEMI.      Past Medical History:  Past Medical History:   Diagnosis Date    Dementia 6/30/2022    See 6/30/2022 note    Depression 6/30/2022    Dysphagia 6/20/2022    mild-mod dysmotility 3/2021 esophagram    Gastroesophageal reflux disease 6/20/2022    Hyperlipidemia 6/20/2022    Hypertension 6/20/2022    MAGDY (iron deficiency anemia) 6/30/2022    MAGDY: FIT neg 2020, no prior EGD/colon, no weight loss or GI s/s, will monitor and consider but not strong candidate due to age and comorbidities, cont iron, Hb 11 on 5/2022 labs at [facility] [place]    Insomnia 6/20/2022    Osteoarthritis of both  "knees 6/20/2022    Moderate DJD per 9/2021 knee x-rays    Osteopenia 6/20/2022    hip T -2.1 9/2021, declined treatment    Spondylosis of lumbar spine 6/20/2022    Type 2 diabetes mellitus 6/20/2022    Vitamin D deficiency 6/20/2022       Past Surgical History:  Past Surgical History:   Procedure Laterality Date    CYSTOSCOPY  2018       Family History:  Family History   Family history unknown: Yes       Social History:  Social History     Tobacco Use    Smoking status: Every Day     Packs/day: 0.25     Types: Cigarettes     Passive exposure: Never    Smokeless tobacco: Never    Tobacco comments:     pt refuses   Substance Use Topics    Alcohol use: Not Currently    Drug use: Never       Allergies:  Review of patient's allergies indicates:   Allergen Reactions    Lisinopril Other (See Comments)     "cough"         Home Medications:  Prior to Admission medications    Medication Sig Start Date End Date Taking? Authorizing Provider   amoxicillin (AMOXIL) 875 MG tablet Take 1 tablet (875 mg total) by mouth 2 (two) times daily.  Patient not taking: Reported on 2/23/2023 2/2/23   Evangelina Garcia MD   aspirin (ECOTRIN) 81 MG EC tablet Take 81 mg by mouth once daily.    Historical Provider   BD ULTRA-FINE FARIDA PEN NEEDLE 32 gauge x 5/32" Ndle USE 1 NEW PEN NEEDLE FOR EACH INJECTION TWICE DAILY 3/23/23   Evangelina Garcia MD   benzonatate (TESSALON) 100 MG capsule Take 2 capsules (200 mg total) by mouth 3 (three) times daily as needed for Cough.  Patient not taking: Reported on 2/23/2023 2/2/23   Evangelina Garcia MD   blood sugar diagnostic (TRUE METRIX GLUCOSE TEST STRIP) Strp USE TO CHECK BLOOD SUGAR LEVELS TWICE DAILY DUE TO FLUCTUATING BLOOD SUGARS 12/4/22   Evangelina Garcia MD   ferrous sulfate (FEOSOL) 325 mg (65 mg iron) Tab tablet Take 325 mg by mouth daily with breakfast.    Historical Provider   fexofenadine (ALLEGRA) 180 MG tablet Take 1 tablet (180 mg total) by mouth once daily. 2/2/23   Evangelina Garcia MD "   flash glucose scanning reader (FREESTYLE JOSHUA 2 READER) Misc Check CBG TID, pt is type2 diabetic, insulin requiring, with uncontrolled and fluctuating blood sugars, dxE11.9 22   Evangelina Garcia MD   flash glucose sensor (FREESTYLE JOSHUA 2 SENSOR) Kit Check CBG TID, pt is type2 diabetic, insulin requiring, with uncontrolled and fluctuating blood sugars, dxE11.9 22   Evangelina Garcia MD   insulin (LANTUS SOLOSTAR U-100 INSULIN) glargine 100 units/mL SubQ pen Inject 30 units subq Q am, and 22 units q PM 22   Evangelina Garcia MD   lancets (EASY TOUCH TWIST LANCETS) 33 gauge Misc USE TO CHECK BLOOD SUGAR LEVELS TWICE DAILY DUE TO FLUCTUATING BLOOD SUGARS 22   Evangelina Garcia MD   melatonin (MELATIN) 3 mg tablet Take 1 tablet (3 mg total) by mouth nightly as needed for Insomnia. 23   Jarred Scott MD   memantine (NAMENDA) 5 MG Tab Take 1 tablet (5 mg total) by mouth 2 (two) times daily. For memory 22   Evangelina Garcia MD   metFORMIN (GLUCOPHAGE-XR) 500 MG ER 24hr tablet Take 1 tablet (500 mg total) by mouth 2 (two) times daily with meals. 3/13/23   Evangelina Garcia MD   metoprolol tartrate (LOPRESSOR) 50 MG tablet Take 1 tablet (50 mg total) by mouth 2 (two) times daily. 22  Evangelina Garcia MD   omeprazole (PRILOSEC) 40 MG capsule Take 1 capsule (40 mg total) by mouth once daily. 22  Evangelina Garcia MD   pravastatin (PRAVACHOL) 20 MG tablet SMARTSI Tablet(s) By Mouth Every Evening 22   Evangelina Garcia MD   TRUE METRIX GLUCOSE METER Misc USE TO CHECK BLOOD SUGAR LEVELS TWICE DAILY DUE TO FLUCTUATING BLOOD SUGARS 1/10/22   Historical Provider   UNABLE TO FIND Lightweight wheelchair     Dx:M47.816, M17.0     Length of need: 99 2/10/23   Evangelina Garcia MD         Review of Systems:  Constitutional: no fever  HEENT: neg for sore throat, ear pain, nasal congestion  Cardiac: neg for chest pain, heart palpitations  Resp: neg  "for SOB, cough  GI: neg for N/D/V. No abdominal pain  Gu: No hematuria or urinary frequency  Musculoskeletal: neg for   Neurologic: + dizziness, confusion  Skin: neg for rash   Endocrine:          Objective:   Last 24 Hour Vital Signs:  BP  Min: 142/66  Max: 172/91  Temp  Av °F (37.2 °C)  Min: 99 °F (37.2 °C)  Max: 99 °F (37.2 °C)  Pulse  Av.4  Min: 92  Max: 102  Resp  Av  Min: 15  Max: 22  SpO2  Av.7 %  Min: 93 %  Max: 96 %  Height  Av' 5" (165.1 cm)  Min: 5' 5" (165.1 cm)  Max: 5' 5" (165.1 cm)  Weight  Av.3 kg (208 lb)  Min: 94.3 kg (208 lb)  Max: 94.3 kg (208 lb)  Body mass index is 34.61 kg/m².  No intake/output data recorded.    Physical Examination:  Gen: Sleepy, in no acute distress. Saturated well on RA  HEENT: AT/NC. Mouth is dry  Neck: supple  Cardiac: RRR, no RMG, no LE pitting edema  Resp: CTABL  Abd: Soft, NTND with +BS  Gu: deferred  Musculoskeletal: Moving all extremities, strength 5/5 B/L  Neurologic:oriented to self, Not place or date  Skin: No rash or lesion noted    Laboratory:  Most Recent Data:  CBC:   Lab Results   Component Value Date    WBC 7.9 2023    HGB 11.5 (L) 2023    HCT 37.1 2023     2023    MCV 91.4 2023    RDW 14.0 2023     WBC Differential:   Recent Labs   Lab 23  1650 23   WBC 8.2 7.9   HGB 11.9* 11.5*   HCT 37.9 37.1    309   MCV 91.3 91.4     BMP:   Lab Results   Component Value Date     2023    K 4.1 2023    CO2 23 2023    BUN 19.4 2023    CREATININE 1.62 (H) 2023    CALCIUM 10.2 2023    MG 1.70 2023    PHOS 4.6 2023     LFTs:   Lab Results   Component Value Date    ALBUMIN 3.2 (L) 2023    BILITOT 0.6 2023    AST 22 2023    ALKPHOS 96 2023    ALT 10 2023     Coags: No results found for: INR, PROTIME, PTT  FLP:   Lab Results   Component Value Date    CHOL 176 2018    HDL 77 2018    TRIG " 151 (H) 07/05/2018     DM:   Lab Results   Component Value Date    HGBA1C 9.6 (H) 11/09/2022    HGBA1C 8.4 (H) 03/15/2018    CREATININE 1.62 (H) 03/24/2023     Cardiac:   Lab Results   Component Value Date    TROPONINI 0.228 (H) 03/24/2023    .5 (H) 03/24/2023     Urinalysis:   Lab Results   Component Value Date    LABURIN No Growth 01/04/2023    PHUA 5.5 03/24/2023    UROBILINOGEN 1+ (A) 03/24/2023    WBCUA 21-50 (A) 03/24/2023       Trended Lab Data:  Recent Labs   Lab 03/24/23  1650 03/24/23 2018   WBC 8.2 7.9   HGB 11.9* 11.5*   HCT 37.9 37.1    309   MCV 91.3 91.4   RDW 13.9 14.0     --    K 4.1  --    CO2 23  --    BUN 19.4  --    CREATININE 1.62*  --    ALBUMIN 3.2*  --    BILITOT 0.6  --    AST 22  --    ALKPHOS 96  --    ALT 10  --        Trended Cardiac Data:  Recent Labs   Lab 03/24/23 1650   TROPONINI 0.228*   .5*       Microbiology Data:  Microbiology Results (last 7 days)       Procedure Component Value Units Date/Time    Blood Culture #1 **CANNOT BE ORDERED STAT** [[id number]] Collected: 03/24/23 1851    Order Status: Sent Specimen: Blood from Hand, Left Updated: 03/24/23 1921    Blood Culture #2 **CANNOT BE ORDERED STAT** [[id number]] Collected: 03/24/23 1900    Order Status: Sent Specimen: Blood from Hand, Right Updated: 03/24/23 1921    Urine culture [[id number]] Collected: 03/24/23 1710    Order Status: Sent Specimen: Urine Updated: 03/24/23 1730             Other Results:  EKG (my interpretation): NSR, L axis deviation, no ST-T wave abnormalities, no Q-waves., PAC's noted, unchanged from previous tracings    Radiology:  Imaging Results              CT Head Without Contrast (Final result)  Result time 03/24/23 17:38:46      Final result by Jean Paul Alfonso MD (03/24/23 17:38:46)                   Impression:      No acute intracranial findings identified.      Electronically signed by: Jean Paul Alfonso  Date:    03/24/2023  Time:    17:38               Narrative:     EXAMINATION:  CT HEAD WITHOUT CONTRAST    CLINICAL HISTORY:  Dizziness, persistent/recurrent, cardiac or vascular cause suspected;    TECHNIQUE:  Sequential axial images were performed of the brain without contrast.    Dose product length of 946 mGycm. Automated exposure control was utilized to minimize radiation dose.    COMPARISON:  January 4, 2023..    FINDINGS:  There is no intracranial mass effect, midline shift, hydrocephalus or hemorrhage. There is no sulcal effacement or low attenuation changes to suggest recent large vessel territory infarction. Chronic similar appearing periventricular and subcortical white matter low attenuation changes are present and are consistent with chronic microangiopathic ischemia. The ventricular system and sulcal markings prominence is consistent with atrophy. There is no acute extra axial fluid collection.  There is mild mucoperiosteal thickening of the left maxillary sinus.  Otherwise, visualized paranasal sinuses are clear without mucosal thickening, polypoidal abnormality or air-fluid levels. Mastoid air cells aeration is optimal.                                       X-Ray Chest AP Portable (Final result)  Result time 03/24/23 17:04:48      Final result by Jean Paul Alfonso MD (03/24/23 17:04:48)                   Impression:      NO ACUTE CARDIOPULMONARY PROCESS IDENTIFIED.      Electronically signed by: Jean Paul Alfonso  Date:    03/24/2023  Time:    17:04               Narrative:    EXAMINATION:  XR CHEST AP PORTABLE    CLINICAL HISTORY:  Chest Pain;    TECHNIQUE:  One    COMPARISON:  March 9, 2023.    FINDINGS:  Cardiopericardial silhouette is within normal limits. Lungs are without dense focal or segmental consolidation, congestive process, pleural effusions or pneumothorax.                                           Assessment & Plan:     NSTEMI  +COVID       - Likely demand ischemia 2/2 to CKD, dehydration or COVID       -Trop of 0.228 on admission       - Loaded with  aspirin 325 oral in the ED       - will trend trop and EKG Q6hrs       - Start on heparin 25,000 units IV       -Continue aspirin 81mg daily       -cardiac monitoring       -in airborne and contact droplet isolation    UTI       - UA with trace bacteria, WKC and 250 LE        - Got rocephin 1g IV in the ED       - continue current abx, awaiting urine and blood Cx    CKD, acute on chronic       -Likely 2/2 to dehydration       -Cr 1.62 on admission (baseline around 1.4)       -gentle hydration with LR IV at 125ml/hr    Hx of Diastolic dysfunction (HFpEF?)      -Echo in 2021 revealed Ef 60% with diastolic dysfunction      -BNP of 334 on admission      -will further evaluate with new echo          DMII       - On Lantus 30U subq Q am and 22U q PM, metformin 500mg BID at home, will hod       - Start on  LDSSI, will titrate as needed       - POCT glucose Q6hrs    HTN       -Resume home lopressor 50mg BID       -Monitor BP    HLD       -Lipid panel 4 yrs ago wnl        -Start on formulary atorvastatin 20mg qd    Hx of dementia        -Resume home memantine 5 mg BID        -reorient as needed        CODE STATUS: full  Access: PIV  Antibiotics: Rocephin IV  Diet: Heart healthy  DVT Prophylaxis: Heparin IV   GI Prophylaxis: Protonix  Fluids: LR at 125ml/hr      Disposition: Admit to Tele. CBC, CMP in am. Continue Abx, monitor VS. Urine and blood Cx pending.      Reji Mansfield MD  [facility] HO-1

## 2023-03-25 NOTE — PROGRESS NOTES
"[facility] Medicine Wards History & Physical Note     Resident Team: [facility] Family medicine  Attending Physician: Ricardo Gonzalez, *MD  Resident: Lucille Rachel MD, Nivia Hemphill MD  Intern: Reji Mansfield MD     Date of Admit: 3/24/2023      Subjective:      Brief HPI:  Michelle Saenz is a 89 y.o.  female with a history of dementia, HTN, HLD, MAGDY, DMII, CKD stage 3, B/L knee OA brought to the ED by EMS on 3/24/2023  with a primary complaint of Dizziness. Spoke with pt  over the phone who reported that pt has been dizzy for about 5 days with decreased PO intake. Pt looks sleepy during my encounter with waxes and wanes in mentation which her  confirmed to be her baseline. Had GCS of 14 and fingerstick CBG of 240 in route to the ED. She lives with  and has HH. She is wheelchair bound at home and ambulates with assistance.  reports no falls or trauma. Pt denies chest pain, SOB, abdominal pain.    Upon arrival to the ED, VSS; satturated at 96% on RA. Laboratory results reveals H/H of 11.9/31.9 which is abound baseline, Cr 1.62, trop 0.2228, .5. UA with WBC, LE and trace bacteria. Tested positive for COVID. CT head and CXR negative for any abnormality. EKG with NSR and PAC. Family medicine team consulted for admission for NSTEMI.    Interval History: No acute events overnight. Saturated well overnight (94%-96% on RA). Patient is still drowsy, but more with it. Now oriented to self, place. Has castillo catheter inserted last night due to pt being anuric. Denies chest pain, SOB, N/V. Appetite and PO intake remain low.    Review of Systems:  See HPI     Objective:   Last 24 Hour Vital Signs:  BP  Min: 103/64  Max: 182/86  Temp  Av.8 °F (37.1 °C)  Min: 97.7 °F (36.5 °C)  Max: 100 °F (37.8 °C)  Pulse  Av.5  Min: 75  Max: 102  Resp  Av.3  Min: 15  Max: 22  SpO2  Av.8 %  Min: 92 %  Max: 98 %  Height  Av' 5" (165.1 cm)  Min: 5' 5" (165.1 cm)  Max: 5' " "5" (165.1 cm)  Weight  Av.2 kg (172 lb 8 oz)  Min: 62.1 kg (137 lb)  Max: 94.3 kg (208 lb)  Body mass index is 22.8 kg/m².  I/O last 3 completed shifts:  In: 50 [IV Piggyback:50]  Out: -     Physical Examination:  Gen: Sleepy, in no acute distress. Saturated well on RA  HEENT: AT/NC. Mouth is dry  Neck: supple  Cardiac: RRR, no RMG, no LE pitting edema  Resp: CTABL  Abd: Soft, NTND with +BS  Gu: deferred  Musculoskeletal: Moving all extremities, strength 5/5 B/L  Neurologic:oriented to self, Not place or date  Skin: No rash or lesion noted    Laboratory:  Most Recent Data:  CBC:   Lab Results   Component Value Date    WBC 10.2 2023    HGB 10.5 (L) 2023    HCT 33.5 (L) 2023     2023    MCV 89.3 2023    RDW 14.1 2023     WBC Differential:   Recent Labs   Lab 23  1650 23  0414   WBC 8.2 7.9 10.2   HGB 11.9* 11.5* 10.5*   HCT 37.9 37.1 33.5*    309 334   MCV 91.3 91.4 89.3       BMP:   Lab Results   Component Value Date     2023    K 4.3 2023    CO2 20 (L) 2023    BUN 23.5 (H) 2023    CREATININE 1.47 (H) 2023    CALCIUM 9.2 2023    MG 1.70 2023    PHOS 4.4 2023     LFTs:   Lab Results   Component Value Date    ALBUMIN 2.5 (L) 2023    BILITOT 0.4 2023    AST 23 2023    ALKPHOS 73 2023    ALT 8 2023     Coags:   Lab Results   Component Value Date    INR 1.09 2023    PROTIME 13.9 2023    .6 2023     FLP:   Lab Results   Component Value Date    CHOL 176 2018    HDL 77 2018    TRIG 151 (H) 2018     DM:   Lab Results   Component Value Date    HGBA1C 8.5 (H) 2023    HGBA1C 9.6 (H) 2022    HGBA1C 8.4 (H) 03/15/2018    CREATININE 1.47 (H) 2023     Cardiac:   Lab Results   Component Value Date    TROPONINI 0.172 (H) 2023    .5 (H) 2023     Urinalysis:   Lab Results   Component Value Date "    LABURIN No Growth At 24 Hours 03/24/2023    PHUA 5.5 03/24/2023    UROBILINOGEN 1+ (A) 03/24/2023    WBCUA 21-50 (A) 03/24/2023       Trended Lab Data:  Recent Labs   Lab 03/24/23  1650 03/24/23 2018 03/25/23  0414   WBC 8.2 7.9 10.2   HGB 11.9* 11.5* 10.5*   HCT 37.9 37.1 33.5*    309 334   MCV 91.3 91.4 89.3   RDW 13.9 14.0 14.1     --  138   K 4.1  --  4.3   CO2 23  --  20*   BUN 19.4  --  23.5*   CREATININE 1.62*  --  1.47*   ALBUMIN 3.2*  --  2.5*   BILITOT 0.6  --  0.4   AST 22  --  23   ALKPHOS 96  --  73   ALT 10  --  8         Trended Cardiac Data:  Recent Labs   Lab 03/24/23 1650 03/24/23 2345 03/25/23 0414   TROPONINI 0.228* 0.214* 0.172*   .5*  --   --          Microbiology Data:  Microbiology Results (last 7 days)       Procedure Component Value Units Date/Time    Urine culture [[id number]] Collected: 03/24/23 1710    Order Status: Completed Specimen: Urine Updated: 03/25/23 0633     Urine Culture No Growth At 24 Hours    Blood Culture #1 **CANNOT BE ORDERED STAT** [[id number]] Collected: 03/24/23 1851    Order Status: Resulted Specimen: Blood from Hand, Left Updated: 03/24/23 1921    Blood Culture #2 **CANNOT BE ORDERED STAT** [[id number]] Collected: 03/24/23 1900    Order Status: Resulted Specimen: Blood from Hand, Right Updated: 03/24/23 1921           Other Results:  EKG (my interpretation): NSR, L axis deviation, no ST-T wave abnormalities, no Q-waves., PAC's noted, unchanged from previous tracings    Radiology:  Imaging Results              CT Head Without Contrast (Final result)  Result time 03/24/23 17:38:46      Final result by Jean Paul Alfonso MD (03/24/23 17:38:46)                   Impression:      No acute intracranial findings identified.      Electronically signed by: Jean Paul Alfonso  Date:    03/24/2023  Time:    17:38               Narrative:    EXAMINATION:  CT HEAD WITHOUT CONTRAST    CLINICAL HISTORY:  Dizziness, persistent/recurrent, cardiac or vascular cause  suspected;    TECHNIQUE:  Sequential axial images were performed of the brain without contrast.    Dose product length of 946 mGycm. Automated exposure control was utilized to minimize radiation dose.    COMPARISON:  January 4, 2023..    FINDINGS:  There is no intracranial mass effect, midline shift, hydrocephalus or hemorrhage. There is no sulcal effacement or low attenuation changes to suggest recent large vessel territory infarction. Chronic similar appearing periventricular and subcortical white matter low attenuation changes are present and are consistent with chronic microangiopathic ischemia. The ventricular system and sulcal markings prominence is consistent with atrophy. There is no acute extra axial fluid collection.  There is mild mucoperiosteal thickening of the left maxillary sinus.  Otherwise, visualized paranasal sinuses are clear without mucosal thickening, polypoidal abnormality or air-fluid levels. Mastoid air cells aeration is optimal.                                       X-Ray Chest AP Portable (Final result)  Result time 03/24/23 17:04:48      Final result by Jean Paul Alfonso MD (03/24/23 17:04:48)                   Impression:      NO ACUTE CARDIOPULMONARY PROCESS IDENTIFIED.      Electronically signed by: Jean Paul Alfonso  Date:    03/24/2023  Time:    17:04               Narrative:    EXAMINATION:  XR CHEST AP PORTABLE    CLINICAL HISTORY:  Chest Pain;    TECHNIQUE:  One    COMPARISON:  March 9, 2023.    FINDINGS:  Cardiopericardial silhouette is within normal limits. Lungs are without dense focal or segmental consolidation, congestive process, pleural effusions or pneumothorax.                                      Assessment & Plan:     NSTEMI  +COVID       - Likely demand ischemia 2/2 to CKD, dehydration or COVID       -Trop of 0.228 on admission       - Loaded with aspirin 325 oral in the ED       - Trop trending down 0.228 -0.214- 0.172. will stop trending       - Switch from heparin gtt to  heparin subq 5000 unit BID       -Continue aspirin 81mg daily       -Continue cardiac monitoring       -Continue airborne and contact droplet isolation       -Remains on RA, saturated at 94-96%     UTI       - UA with trace bacteria, WKC and 250 LE        - Got rocephin 1g IV in the ED       - continue current abx       - urine Cx,no growth at 24 hrs        -blood Cx pending    CKD, acute on chronic       -Likely 2/2 to dehydration       -Cr 1.62 on admission (baseline around 1.4), down to 1.47       -Continue gentle hydration with LR IV at 125ml/hr    Hx of Diastolic dysfunction (HFpEF?)      -Echo in 2021 revealed Ef 60% with diastolic dysfunction      -BNP of 334 on admission      -will further evaluate with new echo    Hx of MAGDY       -H/H 11.5/37.1 on admission. Now 10.5/33.5 likely dilutional       -Restart home ferrous sulfate 1 tablet qd       -will monitor      DMII       - On Lantus 30U subq Q am and 22U q PM, metformin 500mg BID at home, will hold       -POCT glucose 186-208       - Continue LDSSI, adding Lantus 10 units at night; will titrate as needed       - POCT glucose Q6hrs    HTN       -BP remains elevated overnight       -Continue home lopressor 50mg BID. Adding amlodipine 5 mg and hydralazine 5mg IV prn       -Monitor BP    HLD       -Lipid panel 4 yrs ago wnl        -Start on formulary atorvastatin 20mg qd    Hx of dementia        -Hold home memantine 5 mg BID, as could be contributing to pt dizziness and anuria        -reorient as needed        CODE STATUS: full  Access: PIV  Antibiotics: Rocephin IV  Diet: Heart healthy  DVT Prophylaxis: Heparin subq   GI Prophylaxis: Protonix  Fluids: LR at 125ml/hr      Disposition: Continue Abx, monitor VS. Urine Cx; no growth at 24 hrs so far,  blood Cx pending.      Reji Mansfield MD  [facility] HO-1

## 2023-03-25 NOTE — CARE UPDATE
Addendum:    S:  Patient seen and examined this evening. Due to patient status it was difficult obtaining an accurate history. Pt declines any abdominal pain, SOB, nausea, vomiting, fever, dysuria , frequency, chest pain, palpitations  or sick contacts.  She is denying any dizziness. She does have complaint of a sore throat.  In the ED he reported dizziness and palpitations x 5 days. The troponin, BNP, Creatine were elevated. The UA was positive leukocytes.  The Head CT and chest x-ray  was unremarkable. Echo (2021) with EF 60% with diastolic dysfunction and BNP of 334 on admission  Vital signs stable without hypoxia on room air.      PE:  General: appears well, in no acute distress lethargic but awakens to answers questions. States that it is 1920 and she is at home.  Neck: supple, no JVD  Respiratory: Breath sounds CTA bilaterally  Cardiovascular: regular rate and rhythm without murmurs, gallops or rubs; No edema bilateral lower extremities  Gastrointestinal: soft, non-tender, non-distended, normoactive bowel sounds, no guarding, no rebound    A/P:  NSTEMI 2/2 Demand Ischemia  vs cardiac etiology  QTc prolongation  UTI  COVID  ROXANNE  Pharyngitis  IDDM2  HX HFpEF (diastolic dysfunction)    -Agree with H&P   -Heart Score 4 (moderate) and MADAY score 57 (Intermediate/High)  -HFpEF 60% (2021) and   -Will initiate heparin gtt x 24 hours and reevaluate pending the result of the trended troponins and EKG.  -Isolation precautions for COVID in place  -LR at 125 cc/hr   -Ceftriaxone 1 gram daily   -Ordered TSH/Free T4, A1C  -Ordered rapid strep to r/o strep throat  -Ordered dextromethorphan/guaifenesin q4h prn  -Echo pending   -Ordered AMERICO   -Blood culture/urine cultures pending  -Avoid Qtc prolongation medicationa      Code Status: Full Code, declined prolonged life support and does not want feeding tube per record  HC-POA -Scotty Saenz, .    Access:PIV  Antibiotics:Ceftriaxone (Day 0)  Diet:Heart  healthy, Diabetic and Low sodium  DVT Prophylaxis: Heparin gtt  GI Prophylaxis: Protonix 40 mg daily  Fluids:LR at 125 cc/hr      Dispo: Continue IVF, Continue IV Abx, Trend troponin and EKG and maintain respiratory/contact/airway precautions. AM labs  blood and urine culture pending.    Lucille Andino MD PGY II

## 2023-03-25 NOTE — PT/OT/SLP PROGRESS
Physical Therapy      Patient Name:  Michelle Saenz   MRN:  [id number]    Patient not seen today secondary to Other (Comment) (Pt is Covid +; PT services are not available at this time.). Will follow-up 03- as ordered.

## 2023-03-25 NOTE — CARE UPDATE
Nurse telephoned stating that with the IVF pt had not urinated.  Was told that they had to straight cath the patient in the ED.  Unable to bladder scan due to the machine being broken.  Ordered Magdaleno insertion for accurate measurement of output and to relieve potential urinary retention.    Lucille Andino MD, PGY-2  [facility]

## 2023-03-26 LAB
ALBUMIN SERPL-MCNC: 2.5 G/DL (ref 3.4–4.8)
ALBUMIN/GLOB SERPL: 0.7 RATIO (ref 1.1–2)
ALP SERPL-CCNC: 72 UNIT/L (ref 40–150)
ALT SERPL-CCNC: 11 UNIT/L (ref 0–55)
AST SERPL-CCNC: 23 UNIT/L (ref 5–34)
BASOPHILS # BLD AUTO: 0.02 X10(3)/MCL (ref 0–0.2)
BASOPHILS NFR BLD AUTO: 0.4 %
BILIRUBIN DIRECT+TOT PNL SERPL-MCNC: 0.3 MG/DL
BUN SERPL-MCNC: 21 MG/DL (ref 9.8–20.1)
CALCIUM SERPL-MCNC: 9.5 MG/DL (ref 8.4–10.2)
CHLORIDE SERPL-SCNC: 105 MMOL/L (ref 98–111)
CO2 SERPL-SCNC: 24 MMOL/L (ref 23–31)
CREAT SERPL-MCNC: 1.09 MG/DL (ref 0.55–1.02)
EOSINOPHIL # BLD AUTO: 0.11 X10(3)/MCL (ref 0–0.9)
EOSINOPHIL NFR BLD AUTO: 2 %
ERYTHROCYTE [DISTWIDTH] IN BLOOD BY AUTOMATED COUNT: 14.2 % (ref 11.5–17)
GFR SERPLBLD CREATININE-BSD FMLA CKD-EPI: 47 MLS/MIN/1.73/M2
GLOBULIN SER-MCNC: 3.7 GM/DL (ref 2.4–3.5)
GLUCOSE SERPL-MCNC: 187 MG/DL (ref 75–121)
HCT VFR BLD AUTO: 35.5 % (ref 37–47)
HGB BLD-MCNC: 11.3 G/DL (ref 12–16)
IMM GRANULOCYTES # BLD AUTO: 0.01 X10(3)/MCL (ref 0–0.04)
IMM GRANULOCYTES NFR BLD AUTO: 0.2 %
LYMPHOCYTES # BLD AUTO: 2.06 X10(3)/MCL (ref 0.6–4.6)
LYMPHOCYTES NFR BLD AUTO: 38 %
MAGNESIUM SERPL-MCNC: 1.6 MG/DL (ref 1.6–2.6)
MCH RBC QN AUTO: 29 PG (ref 27–31)
MCHC RBC AUTO-ENTMCNC: 31.8 G/DL (ref 33–36)
MCV RBC AUTO: 91 FL (ref 80–94)
MONOCYTES # BLD AUTO: 0.58 X10(3)/MCL (ref 0.1–1.3)
MONOCYTES NFR BLD AUTO: 10.7 %
NEUTROPHILS # BLD AUTO: 2.64 X10(3)/MCL (ref 2.1–9.2)
NEUTROPHILS NFR BLD AUTO: 48.7 %
NRBC BLD AUTO-RTO: 0.7 %
PHOSPHATE SERPL-MCNC: 2.6 MG/DL (ref 2.3–4.7)
PLATELET # BLD AUTO: 340 X10(3)/MCL (ref 130–400)
PMV BLD AUTO: 10.2 FL (ref 7.4–10.4)
POCT GLUCOSE: 170 MG/DL (ref 70–110)
POCT GLUCOSE: 171 MG/DL (ref 70–110)
POCT GLUCOSE: 426 MG/DL (ref 70–110)
POTASSIUM SERPL-SCNC: 3.8 MMOL/L (ref 3.5–5.1)
PROT SERPL-MCNC: 6.2 GM/DL (ref 5.8–7.6)
RBC # BLD AUTO: 3.9 X10(6)/MCL (ref 4.2–5.4)
SODIUM SERPL-SCNC: 139 MMOL/L (ref 132–146)
WBC # SPEC AUTO: 5.4 X10(3)/MCL (ref 4.5–11.5)

## 2023-03-26 PROCEDURE — 27000207 HC ISOLATION

## 2023-03-26 PROCEDURE — 94761 N-INVAS EAR/PLS OXIMETRY MLT: CPT

## 2023-03-26 PROCEDURE — 85025 COMPLETE CBC W/AUTO DIFF WBC: CPT | Performed by: NURSE PRACTITIONER

## 2023-03-26 PROCEDURE — C9113 INJ PANTOPRAZOLE SODIUM, VIA: HCPCS | Performed by: NURSE PRACTITIONER

## 2023-03-26 PROCEDURE — 21400001 HC TELEMETRY ROOM

## 2023-03-26 PROCEDURE — 25000003 PHARM REV CODE 250: Performed by: NURSE PRACTITIONER

## 2023-03-26 PROCEDURE — 63600175 PHARM REV CODE 636 W HCPCS: Performed by: NURSE PRACTITIONER

## 2023-03-26 PROCEDURE — 80053 COMPREHEN METABOLIC PANEL: CPT | Performed by: NURSE PRACTITIONER

## 2023-03-26 PROCEDURE — 25000003 PHARM REV CODE 250: Performed by: FAMILY MEDICINE

## 2023-03-26 PROCEDURE — 25000003 PHARM REV CODE 250: Performed by: STUDENT IN AN ORGANIZED HEALTH CARE EDUCATION/TRAINING PROGRAM

## 2023-03-26 PROCEDURE — 83735 ASSAY OF MAGNESIUM: CPT | Performed by: NURSE PRACTITIONER

## 2023-03-26 PROCEDURE — 84100 ASSAY OF PHOSPHORUS: CPT | Performed by: NURSE PRACTITIONER

## 2023-03-26 PROCEDURE — 63600175 PHARM REV CODE 636 W HCPCS

## 2023-03-26 RX ORDER — MAGNESIUM SULFATE 1 G/100ML
1 INJECTION INTRAVENOUS ONCE
Status: COMPLETED | OUTPATIENT
Start: 2023-03-26 | End: 2023-03-26

## 2023-03-26 RX ORDER — SODIUM CHLORIDE, SODIUM LACTATE, POTASSIUM CHLORIDE, CALCIUM CHLORIDE 600; 310; 30; 20 MG/100ML; MG/100ML; MG/100ML; MG/100ML
INJECTION, SOLUTION INTRAVENOUS CONTINUOUS
Status: DISCONTINUED | OUTPATIENT
Start: 2023-03-26 | End: 2023-03-26

## 2023-03-26 RX ORDER — GUAIFENESIN/DEXTROMETHORPHAN 100-10MG/5
10 SYRUP ORAL EVERY 6 HOURS
Status: DISCONTINUED | OUTPATIENT
Start: 2023-03-26 | End: 2023-04-05 | Stop reason: HOSPADM

## 2023-03-26 RX ADMIN — GUAIFENESIN AND DEXTROMETHORPHAN 10 ML: 100; 10 SYRUP ORAL at 05:03

## 2023-03-26 RX ADMIN — METOPROLOL TARTRATE 50 MG: 50 TABLET, FILM COATED ORAL at 08:03

## 2023-03-26 RX ADMIN — MUPIROCIN: 20 OINTMENT TOPICAL at 08:03

## 2023-03-26 RX ADMIN — CEFTRIAXONE SODIUM 1 G: 1 INJECTION, POWDER, FOR SOLUTION INTRAMUSCULAR; INTRAVENOUS at 08:03

## 2023-03-26 RX ADMIN — INSULIN ASPART 2 UNITS: 100 INJECTION, SOLUTION INTRAVENOUS; SUBCUTANEOUS at 08:03

## 2023-03-26 RX ADMIN — HEPARIN SODIUM 5000 UNITS: 5000 INJECTION INTRAVENOUS; SUBCUTANEOUS at 08:03

## 2023-03-26 RX ADMIN — INSULIN DETEMIR 10 UNITS: 100 INJECTION, SOLUTION SUBCUTANEOUS at 08:03

## 2023-03-26 RX ADMIN — INSULIN ASPART 5 UNITS: 100 INJECTION, SOLUTION INTRAVENOUS; SUBCUTANEOUS at 12:03

## 2023-03-26 RX ADMIN — HYDRALAZINE HYDROCHLORIDE 5 MG: 20 INJECTION INTRAMUSCULAR; INTRAVENOUS at 11:03

## 2023-03-26 RX ADMIN — AMLODIPINE BESYLATE 5 MG: 5 TABLET ORAL at 06:03

## 2023-03-26 RX ADMIN — HYDRALAZINE HYDROCHLORIDE 5 MG: 20 INJECTION INTRAMUSCULAR; INTRAVENOUS at 05:03

## 2023-03-26 RX ADMIN — GUAIFENESIN AND DEXTROMETHORPHAN 10 ML: 100; 10 SYRUP ORAL at 11:03

## 2023-03-26 RX ADMIN — ATORVASTATIN CALCIUM 20 MG: 20 TABLET, FILM COATED ORAL at 08:03

## 2023-03-26 RX ADMIN — MAGNESIUM SULFATE HEPTAHYDRATE 1 G: 1 INJECTION, SOLUTION INTRAVENOUS at 08:03

## 2023-03-26 RX ADMIN — GUAIFENESIN AND DEXTROMETHORPHAN 10 ML: 100; 10 SYRUP ORAL at 12:03

## 2023-03-26 RX ADMIN — PANTOPRAZOLE SODIUM 40 MG: 40 INJECTION, POWDER, FOR SOLUTION INTRAVENOUS at 08:03

## 2023-03-26 RX ADMIN — ASPIRIN 81 MG: 81 TABLET, COATED ORAL at 08:03

## 2023-03-26 RX ADMIN — SODIUM CHLORIDE, POTASSIUM CHLORIDE, SODIUM LACTATE AND CALCIUM CHLORIDE: 600; 310; 30; 20 INJECTION, SOLUTION INTRAVENOUS at 06:03

## 2023-03-26 NOTE — PROGRESS NOTES
[facility] Wards Progress Note     Resident Team: [facility] Family Medicine List   Attending Physician: Ricardo Gonzalez, *MD  Resident: Lucille Rachel MD, Nivia Hemphill MD  Intern:  Reji Mansfield MD        Subjective:      Brief HPI: Dany is a 89 y.o.  female with a history of dementia, HTN, HLD, MAGDY, DMII, CKD stage 3, B/L knee OA brought to the ED by EMS on 3/24/2023  with a primary complaint of Dizziness. Spoke with pt  over the phone who reported that pt has been dizzy for about 5 days with decreased PO intake. Pt looks sleepy during my encounter with waxes and wanes in mentation which her  confirmed to be her baseline. Had GCS of 14 and fingerstick CBG of 240 in route to the ED. She lives with  and has HH. She is wheelchair bound at home and ambulates with assistance.  reports no falls or trauma. Pt denies chest pain, SOB, abdominal pain.     Upon arrival to the ED, VSS; satturated at 96% on RA. Laboratory results reveals H/H of 11.9/31.9 which is abound baseline, Cr 1.62, trop 0.2228, .5. UA with WBC, LE and trace bacteria. Tested positive for COVID. CT head and CXR negative for any abnormality. EKG with NSR and PAC. Family medicine team consulted for admission for NSTEMI.     Interval History: No acute events overnight. Saturated well overnight (96%-97% on RA). Patient is still lethargic, but more alert and Oriented. Has castillo catheter with goof urine output. Denies chest pain, SOB, N/V. Appetite and PO intake remain low      Review of Systems:  ROS completed and negative except as indicated above.     Objective:     Last 24 Hour Vital Signs:  BP  Min: 134/55  Max: 184/81  Temp  Av.4 °F (36.9 °C)  Min: 97.5 °F (36.4 °C)  Max: 99.1 °F (37.3 °C)  Pulse  Av.9  Min: 68  Max: 76  Resp  Avg: 15.7  Min: 14  Max: 18  SpO2  Av.6 %  Min: 95 %  Max: 98 %  I/O last 3 completed shifts:  In:  [P.O.:605; I.V.:1500; IV Piggyback:50]  Out:   [Urine:2200]    Physical Examination:  Gen: lethargic, in no acute distress. Saturated well on RA  HEENT: AT/NC  Neck: supple  Cardiac: RRR, no RMG, no LE pitting edema  Resp: CTABL  Abd: Soft, NTND with +BS  Gu: Magdaleno in place with good urine output  Musculoskeletal: Moving all extremities, strength 5/5 B/L  Neurologic:oriented to self and place. More  alert and oriented  Skin: No rash or lesion    Laboratory:  Most Recent Data:  CBC:   Lab Results   Component Value Date    WBC 5.4 03/26/2023    HGB 11.3 (L) 03/26/2023    HCT 35.5 (L) 03/26/2023     03/26/2023    MCV 91.0 03/26/2023    RDW 14.2 03/26/2023     WBC Differential:   Recent Labs   Lab 03/24/23  1650 03/24/23 2018 03/25/23  0414 03/26/23  0648   WBC 8.2 7.9 10.2 5.4   HGB 11.9* 11.5* 10.5* 11.3*   HCT 37.9 37.1 33.5* 35.5*    309 334 340   MCV 91.3 91.4 89.3 91.0     BMP:   Lab Results   Component Value Date     03/26/2023    K 3.8 03/26/2023    CO2 24 03/26/2023    BUN 21.0 (H) 03/26/2023    CREATININE 1.09 (H) 03/26/2023    CALCIUM 9.5 03/26/2023    MG 1.60 03/26/2023    PHOS 2.6 03/26/2023     LFTs:   Lab Results   Component Value Date    ALBUMIN 2.5 (L) 03/26/2023    BILITOT 0.3 03/26/2023    AST 23 03/26/2023    ALKPHOS 72 03/26/2023    ALT 11 03/26/2023     Coags:   Lab Results   Component Value Date    INR 1.09 03/24/2023    PROTIME 13.9 03/24/2023    PTT 34.3 03/25/2023     DM:   Lab Results   Component Value Date    HGBA1C 8.5 (H) 03/24/2023    HGBA1C 9.6 (H) 11/09/2022    HGBA1C 8.4 (H) 03/15/2018    CREATININE 1.09 (H) 03/26/2023     Thyroid:   Lab Results   Component Value Date    TSH 1.435 03/24/2023        Cardiac:   Lab Results   Component Value Date    TROPONINI 0.129 (H) 03/25/2023    .5 (H) 03/24/2023     Microbiology Data:  Microbiology Results (last 7 days)       Procedure Component Value Units Date/Time    Blood Culture #1 **CANNOT BE ORDERED STAT** [[id number]]  (Normal) Collected: 03/24/23 1851    Order  Status: Completed Specimen: Blood from Hand, Left Updated: 03/25/23 2300     CULTURE, BLOOD ([facility]) No Growth At 24 Hours    Blood Culture #2 **CANNOT BE ORDERED STAT** [[id number]]  (Normal) Collected: 03/24/23 1900    Order Status: Completed Specimen: Blood from Hand, Right Updated: 03/25/23 2300     CULTURE, BLOOD ([facility]) No Growth At 24 Hours    Urine culture [[id number]] Collected: 03/24/23 1710    Order Status: Completed Specimen: Urine Updated: 03/25/23 0633     Urine Culture No Growth At 24 Hours             Radiology:  Imaging Results              CT Head Without Contrast (Final result)  Result time 03/24/23 17:38:46      Final result by Jean Paul Alfonso MD (03/24/23 17:38:46)                   Impression:      No acute intracranial findings identified.      Electronically signed by: Jean Paul Alfonso  Date:    03/24/2023  Time:    17:38               Narrative:    EXAMINATION:  CT HEAD WITHOUT CONTRAST    CLINICAL HISTORY:  Dizziness, persistent/recurrent, cardiac or vascular cause suspected;    TECHNIQUE:  Sequential axial images were performed of the brain without contrast.    Dose product length of 946 mGycm. Automated exposure control was utilized to minimize radiation dose.    COMPARISON:  January 4, 2023..    FINDINGS:  There is no intracranial mass effect, midline shift, hydrocephalus or hemorrhage. There is no sulcal effacement or low attenuation changes to suggest recent large vessel territory infarction. Chronic similar appearing periventricular and subcortical white matter low attenuation changes are present and are consistent with chronic microangiopathic ischemia. The ventricular system and sulcal markings prominence is consistent with atrophy. There is no acute extra axial fluid collection.  There is mild mucoperiosteal thickening of the left maxillary sinus.  Otherwise, visualized paranasal sinuses are clear without mucosal thickening, polypoidal abnormality or air-fluid levels. Mastoid air cells  aeration is optimal.                                       X-Ray Chest AP Portable (Final result)  Result time 03/24/23 17:04:48      Final result by Jean Paul Alfonso MD (03/24/23 17:04:48)                   Impression:      NO ACUTE CARDIOPULMONARY PROCESS IDENTIFIED.      Electronically signed by: Jean aPul Alfonso  Date:    03/24/2023  Time:    17:04               Narrative:    EXAMINATION:  XR CHEST AP PORTABLE    CLINICAL HISTORY:  Chest Pain;    TECHNIQUE:  One    COMPARISON:  March 9, 2023.    FINDINGS:  Cardiopericardial silhouette is within normal limits. Lungs are without dense focal or segmental consolidation, congestive process, pleural effusions or pneumothorax.                                      Current Medications:     Infusions:       Scheduled:   amLODIPine  5 mg Oral Daily    aspirin  81 mg Oral Daily    atorvastatin  20 mg Oral QHS    cefTRIAXone (ROCEPHIN) IVPB  1 g Intravenous Q24H    dextromethorphan-guaiFENesin  mg/5 ml  10 mL Oral Q6H    ferrous sulfate  1 tablet Oral Every other day    heparin (porcine)  5,000 Units Subcutaneous Q12H    insulin detemir U-100  10 Units Subcutaneous QHS    magnesium sulfate IVPB  1 g Intravenous Once    metoprolol tartrate  50 mg Oral BID    mupirocin   Nasal BID    pantoprozole (PROTONIX) IV  40 mg Intravenous Daily        PRN:  dextrose 10%, dextrose 10%, dextrose, dextrose, dextrose 50%, dextrose 50%, glucagon (human recombinant), hydrALAZINE, insulin aspart U-100, naloxone, nitroGLYCERIN, sodium chloride 0.9%    Assessment & Plan:      NSTEMI  +COVID       - Likely demand ischemia 2/2 to CKD, dehydration or COVID       -Trop of 0.228 on admission       - Loaded with aspirin 325 oral in the ED       - Trop trending down 0.228 -0.214- 0.172. stop trending       - Switch from heparin gtt to heparin subq 5000 unit BID 3/25/23       -Continue aspirin 81mg daily       -Continue cardiac monitoring       -Continue airborne and contact droplet isolation        -Remains on RA, saturated at 96-97%      UTI       - UA with trace bacteria, WBC and 250 LE        - Got rocephin 1g IV in the ED       - continue continue rocephin 1g IV abx       - urine and blood Cx,no growth at 24 hrs      CKD, acute on chronic       -Likely 2/2 to dehydration       -Cr 1.62 on admission (baseline around 1.4), now down to 1.09       -IVF discontinued. Will keep castillo on for now, consider d/c tomorrow if continue to have good urine output     Hx of Diastolic dysfunction (HFpEF?)      -Echo in 2021 revealed Ef 60% with diastolic dysfunction      -BNP of 334 on admission      -will further evaluate with new echo     Hx of MAGDY       -H/H 11.5/37.1 on admission. Now 11.3/35.5        -Restarted on home ferrous sulfate 1 tablet qd       -continue to monitor     DMII       - On Lantus 30U subq Q am and 22U q PM, metformin 500mg BID at home, will hold       -POCT glucose within range       - Continue LDSSI and Lantus 10 units at night; titrate as needed       - POCT glucose Q6hrs     HTN       -BP better control       -Continue home lopressor 50mg, amlodipine 5 mg, and hydralazine 5mg IV prn       -Continue to Monitor BP     HLD       -Lipid panel 4 yrs ago wnl        -On formulary atorvastatin 20mg qd     Hx of dementia        -continue holding home memantine 5 mg BID, as could be contributing to pt dizziness and anuria        -reorient as needed        CODE STATUS: full  Access: PIV  Antibiotics: Rocephin IV  Diet: Heart healthy  DVT Prophylaxis: Heparin subq   GI Prophylaxis: Protonix  Fluids: LR at 125ml/hr, discontinued      Disposition: Continue Abx, monitor VS. Urine and blood  Cx no growth at 24 hrs. Pt could benefit from SNIF vs NH, discuss with family        Reji Mansfield MD  [facility] HO-1

## 2023-03-26 NOTE — PLAN OF CARE
03/26/23 0913   Discharge Assessment   Who is going to help you get home at discharge? Lee Kaufman, nephew, P: [phone]   How do you get to doctors appointments? family or friend will provide   Are you on dialysis? No   Do you take coumadin? No   Discharge Plan A Skilled Nursing Facility   Discharge Plan B Home Health;Home with family   DME Needed Upon Discharge  none   Discharge Plan discussed with: Spouse/sig other   Name(s) and Number(s) Scotty Saenz, , P: [phone]; Lee Kaufman, nephew, P: [phone]   Discharge Barriers Identified None   Physical Activity   On average, how many days per week do you engage in moderate to strenuous exercise (like a brisk walk)? 0 days   On average, how many minutes do you engage in exercise at this level? 0 min   Financial Resource Strain   How hard is it for you to pay for the very basics like food, housing, medical care, and heating? Not hard   Housing Stability   In the last 12 months, was there a time when you were not able to pay the mortgage or rent on time? N   In the last 12 months, how many places have you lived? 1   In the last 12 months, was there a time when you did not have a steady place to sleep or slept in a shelter (including now)? N   Transportation Needs   In the past 12 months, has lack of transportation kept you from medical appointments or from getting medications? no   In the past 12 months, has lack of transportation kept you from meetings, work, or from getting things needed for daily living? No   Food Insecurity   Within the past 12 months, you worried that your food would run out before you got the money to buy more. Never true   Within the past 12 months, the food you bought just didn't last and you didn't have money to get more. Never true   Stress   Do you feel stress - tense, restless, nervous, or anxious, or unable to sleep at night because your mind is troubled all the time - these days? Not at all   Social Connections    In a typical week, how many times do you talk on the phone with family, friends, or neighbors? More than 3   How often do you get together with friends or relatives? More than 3   How often do you attend Zoroastrian or Uatsdin services? Never   Do you belong to any clubs or organizations such as Zoroastrian groups, unions, fraternal or athletic groups, or school groups? No   How often do you attend meetings of the clubs or organizations you belong to? Never   Are you , , , , never , or living with a partner?    Alcohol Use   Q1: How often do you have a drink containing alcohol? Never   Q2: How many drinks containing alcohol do you have on a typical day when you are drinking? None   Q3: How often do you have six or more drinks on one occasion? Never   OTHER   Name(s) of People in Home Scotty Saenz, , P: [phone]     Spoke to patient's , Scotty, P: [phone]. He stated that patient is normally oriented to person and place, but never time. Patient is current with [facility] . He stated that patient went to SNF approximately 1 year ago, and it helped. He is in favor of it again, but couldn't remember the name of the facility. He stated that his nephew, Lee, P: [phone], takes them to doctors appointment, helps with their care, and that he would know. Spoke to Lee who stated that it was [facility] and that patient did well there. His first choice this time is [facility], with [facility] second. Referral sent to [facility] via [facility]. PT and OT recs are still pending.

## 2023-03-27 LAB
ALBUMIN SERPL-MCNC: 2.9 G/DL (ref 3.4–4.8)
ALBUMIN/GLOB SERPL: 0.7 RATIO (ref 1.1–2)
ALP SERPL-CCNC: 83 UNIT/L (ref 40–150)
ALT SERPL-CCNC: 12 UNIT/L (ref 0–55)
AST SERPL-CCNC: 19 UNIT/L (ref 5–34)
AV INDEX (PROSTH): 0.51
AV MEAN GRADIENT: 15 MMHG
AV PEAK GRADIENT: 25 MMHG
AV VALVE AREA: 1.58 CM2
AV VELOCITY RATIO: 0.43
BACTERIA UR CULT: ABNORMAL
BASOPHILS # BLD AUTO: 0.02 X10(3)/MCL (ref 0–0.2)
BASOPHILS NFR BLD AUTO: 0.3 %
BILIRUBIN DIRECT+TOT PNL SERPL-MCNC: 0.4 MG/DL
BSA FOR ECHO PROCEDURE: 1.69 M2
BUN SERPL-MCNC: 15.8 MG/DL (ref 9.8–20.1)
CALCIUM SERPL-MCNC: 9.4 MG/DL (ref 8.4–10.2)
CHLORIDE SERPL-SCNC: 104 MMOL/L (ref 98–111)
CO2 SERPL-SCNC: 20 MMOL/L (ref 23–31)
CREAT SERPL-MCNC: 1.07 MG/DL (ref 0.55–1.02)
CV ECHO LV RWT: 0.61 CM
DOP CALC AO PEAK VEL: 2.48 M/S
DOP CALC AO VTI: 48.4 CM
DOP CALC LVOT AREA: 3.1 CM2
DOP CALC LVOT DIAMETER: 1.98 CM
DOP CALC LVOT PEAK VEL: 1.06 M/S
DOP CALC LVOT STROKE VOLUME: 76.32 CM3
DOP CALC MV VTI: 32.8 CM
DOP CALCLVOT PEAK VEL VTI: 24.8 CM
E WAVE DECELERATION TIME: 148.41 MSEC
E/A RATIO: 0.66
E/E' RATIO: 21.11 M/S
ECHO LV POSTERIOR WALL: 1.12 CM (ref 0.6–1.1)
EJECTION FRACTION: 55 %
EOSINOPHIL # BLD AUTO: 0.05 X10(3)/MCL (ref 0–0.9)
EOSINOPHIL NFR BLD AUTO: 0.7 %
ERYTHROCYTE [DISTWIDTH] IN BLOOD BY AUTOMATED COUNT: 13.6 % (ref 11.5–17)
FRACTIONAL SHORTENING: 28 % (ref 28–44)
GFR SERPLBLD CREATININE-BSD FMLA CKD-EPI: 49 MLS/MIN/1.73/M2
GLOBULIN SER-MCNC: 4.2 GM/DL (ref 2.4–3.5)
GLUCOSE SERPL-MCNC: 307 MG/DL (ref 75–121)
HCT VFR BLD AUTO: 39.3 % (ref 37–47)
HGB BLD-MCNC: 12.2 G/DL (ref 12–16)
IMM GRANULOCYTES # BLD AUTO: 0.04 X10(3)/MCL (ref 0–0.04)
IMM GRANULOCYTES NFR BLD AUTO: 0.6 %
INTERVENTRICULAR SEPTUM: 1.21 CM (ref 0.6–1.1)
LEFT ATRIUM SIZE: 3.35 CM
LEFT INTERNAL DIMENSION IN SYSTOLE: 2.62 CM (ref 2.1–4)
LEFT VENTRICLE DIASTOLIC VOLUME INDEX: 33.46 ML/M2
LEFT VENTRICLE DIASTOLIC VOLUME: 56.22 ML
LEFT VENTRICLE MASS INDEX: 82 G/M2
LEFT VENTRICLE SYSTOLIC VOLUME INDEX: 14.9 ML/M2
LEFT VENTRICLE SYSTOLIC VOLUME: 25.11 ML
LEFT VENTRICULAR INTERNAL DIMENSION IN DIASTOLE: 3.65 CM (ref 3.5–6)
LEFT VENTRICULAR MASS: 138.06 G
LV LATERAL E/E' RATIO: 19 M/S
LV SEPTAL E/E' RATIO: 23.75 M/S
LVOT MG: 2.4 MMHG
LVOT MV: 0.73 CM/S
LYMPHOCYTES # BLD AUTO: 2.1 X10(3)/MCL (ref 0.6–4.6)
LYMPHOCYTES NFR BLD AUTO: 30.8 %
MCH RBC QN AUTO: 28 PG (ref 27–31)
MCHC RBC AUTO-ENTMCNC: 31 G/DL (ref 33–36)
MCV RBC AUTO: 90.1 FL (ref 80–94)
MONOCYTES # BLD AUTO: 0.66 X10(3)/MCL (ref 0.1–1.3)
MONOCYTES NFR BLD AUTO: 9.7 %
MV MEAN GRADIENT: 4 MMHG
MV PEAK A VEL: 1.45 M/S
MV PEAK E VEL: 0.95 M/S
MV PEAK GRADIENT: 11 MMHG
MV STENOSIS PRESSURE HALF TIME: 43.04 MS
MV VALVE AREA BY CONTINUITY EQUATION: 2.33 CM2
MV VALVE AREA P 1/2 METHOD: 5.11 CM2
NEUTROPHILS # BLD AUTO: 3.94 X10(3)/MCL (ref 2.1–9.2)
NEUTROPHILS NFR BLD AUTO: 57.9 %
NRBC BLD AUTO-RTO: 0 %
PHOSPHATE SERPL-MCNC: 2.2 MG/DL (ref 2.3–4.7)
PISA TR MAX VEL: 1.72 M/S
PLATELET # BLD AUTO: 379 X10(3)/MCL (ref 130–400)
PMV BLD AUTO: 10.1 FL (ref 7.4–10.4)
POCT GLUCOSE: 227 MG/DL (ref 70–110)
POCT GLUCOSE: 241 MG/DL (ref 70–110)
POCT GLUCOSE: 307 MG/DL (ref 70–110)
POCT GLUCOSE: 318 MG/DL (ref 70–110)
POCT GLUCOSE: 378 MG/DL (ref 70–110)
POTASSIUM SERPL-SCNC: 4.1 MMOL/L (ref 3.5–5.1)
PROT SERPL-MCNC: 7.1 GM/DL (ref 5.8–7.6)
RA PRESSURE: 3 MMHG
RBC # BLD AUTO: 4.36 X10(6)/MCL (ref 4.2–5.4)
RIGHT VENTRICULAR END-DIASTOLIC DIMENSION: 2.46 CM
SODIUM SERPL-SCNC: 134 MMOL/L (ref 132–146)
TDI LATERAL: 0.05 M/S
TDI SEPTAL: 0.04 M/S
TDI: 0.05 M/S
TR MAX PG: 12 MMHG
TV REST PULMONARY ARTERY PRESSURE: 15 MMHG
WBC # SPEC AUTO: 6.8 X10(3)/MCL (ref 4.5–11.5)

## 2023-03-27 PROCEDURE — 63600175 PHARM REV CODE 636 W HCPCS

## 2023-03-27 PROCEDURE — C9113 INJ PANTOPRAZOLE SODIUM, VIA: HCPCS | Performed by: NURSE PRACTITIONER

## 2023-03-27 PROCEDURE — 97167 OT EVAL HIGH COMPLEX 60 MIN: CPT

## 2023-03-27 PROCEDURE — 63600175 PHARM REV CODE 636 W HCPCS: Performed by: NURSE PRACTITIONER

## 2023-03-27 PROCEDURE — 25000003 PHARM REV CODE 250: Performed by: FAMILY MEDICINE

## 2023-03-27 PROCEDURE — 85025 COMPLETE CBC W/AUTO DIFF WBC: CPT | Performed by: NURSE PRACTITIONER

## 2023-03-27 PROCEDURE — 97162 PT EVAL MOD COMPLEX 30 MIN: CPT

## 2023-03-27 PROCEDURE — 80053 COMPREHEN METABOLIC PANEL: CPT | Performed by: NURSE PRACTITIONER

## 2023-03-27 PROCEDURE — 25000003 PHARM REV CODE 250: Performed by: STUDENT IN AN ORGANIZED HEALTH CARE EDUCATION/TRAINING PROGRAM

## 2023-03-27 PROCEDURE — 21400001 HC TELEMETRY ROOM

## 2023-03-27 PROCEDURE — 63600175 PHARM REV CODE 636 W HCPCS: Performed by: STUDENT IN AN ORGANIZED HEALTH CARE EDUCATION/TRAINING PROGRAM

## 2023-03-27 PROCEDURE — 25000003 PHARM REV CODE 250: Performed by: NURSE PRACTITIONER

## 2023-03-27 PROCEDURE — 27000207 HC ISOLATION

## 2023-03-27 PROCEDURE — 84100 ASSAY OF PHOSPHORUS: CPT | Performed by: NURSE PRACTITIONER

## 2023-03-27 RX ORDER — MEMANTINE HYDROCHLORIDE 5 MG/1
5 TABLET ORAL 2 TIMES DAILY
Status: DISCONTINUED | OUTPATIENT
Start: 2023-03-27 | End: 2023-04-05 | Stop reason: HOSPADM

## 2023-03-27 RX ORDER — ACETAMINOPHEN 325 MG/1
650 TABLET ORAL EVERY 6 HOURS PRN
Status: DISCONTINUED | OUTPATIENT
Start: 2023-03-27 | End: 2023-04-05 | Stop reason: HOSPADM

## 2023-03-27 RX ADMIN — MUPIROCIN: 20 OINTMENT TOPICAL at 09:03

## 2023-03-27 RX ADMIN — INSULIN ASPART 2 UNITS: 100 INJECTION, SOLUTION INTRAVENOUS; SUBCUTANEOUS at 06:03

## 2023-03-27 RX ADMIN — INSULIN ASPART 5 UNITS: 100 INJECTION, SOLUTION INTRAVENOUS; SUBCUTANEOUS at 12:03

## 2023-03-27 RX ADMIN — AMLODIPINE BESYLATE 5 MG: 5 TABLET ORAL at 09:03

## 2023-03-27 RX ADMIN — METOPROLOL TARTRATE 50 MG: 50 TABLET, FILM COATED ORAL at 09:03

## 2023-03-27 RX ADMIN — ASPIRIN 81 MG: 81 TABLET, COATED ORAL at 09:03

## 2023-03-27 RX ADMIN — MEMANTINE 5 MG: 5 TABLET ORAL at 09:03

## 2023-03-27 RX ADMIN — GUAIFENESIN AND DEXTROMETHORPHAN 10 ML: 100; 10 SYRUP ORAL at 05:03

## 2023-03-27 RX ADMIN — HYDRALAZINE HYDROCHLORIDE 5 MG: 20 INJECTION INTRAMUSCULAR; INTRAVENOUS at 05:03

## 2023-03-27 RX ADMIN — ATORVASTATIN CALCIUM 20 MG: 20 TABLET, FILM COATED ORAL at 09:03

## 2023-03-27 RX ADMIN — PANTOPRAZOLE SODIUM 40 MG: 40 INJECTION, POWDER, FOR SOLUTION INTRAVENOUS at 09:03

## 2023-03-27 RX ADMIN — INSULIN DETEMIR 22 UNITS: 100 INJECTION, SOLUTION SUBCUTANEOUS at 09:03

## 2023-03-27 RX ADMIN — HEPARIN SODIUM 5000 UNITS: 5000 INJECTION INTRAVENOUS; SUBCUTANEOUS at 09:03

## 2023-03-27 RX ADMIN — CEFTRIAXONE SODIUM 1 G: 1 INJECTION, POWDER, FOR SOLUTION INTRAMUSCULAR; INTRAVENOUS at 10:03

## 2023-03-27 RX ADMIN — GUAIFENESIN AND DEXTROMETHORPHAN 10 ML: 100; 10 SYRUP ORAL at 12:03

## 2023-03-27 RX ADMIN — MUPIROCIN: 20 OINTMENT TOPICAL at 10:03

## 2023-03-27 RX ADMIN — FERROUS SULFATE TAB EC 325 MG (65 MG FE EQUIVALENT) 1 EACH: 325 (65 FE) TABLET DELAYED RESPONSE at 09:03

## 2023-03-27 RX ADMIN — INSULIN ASPART 1 UNITS: 100 INJECTION, SOLUTION INTRAVENOUS; SUBCUTANEOUS at 09:03

## 2023-03-27 RX ADMIN — INSULIN ASPART 4 UNITS: 100 INJECTION, SOLUTION INTRAVENOUS; SUBCUTANEOUS at 05:03

## 2023-03-27 RX ADMIN — ACETAMINOPHEN 650 MG: 325 TABLET ORAL at 09:03

## 2023-03-27 RX ADMIN — GUAIFENESIN AND DEXTROMETHORPHAN 10 ML: 100; 10 SYRUP ORAL at 11:03

## 2023-03-27 NOTE — PLAN OF CARE
03/27/23 1029   Discharge Reassessment   Assessment Type Discharge Planning Reassessment   Did the patient's condition or plan change since previous assessment? No   Discharge Plan discussed with: Adult children   Name(s) and Number(s) WESTON Holland (Relative)   [phone]   Discharge Plan A Skilled Nursing Facility   DME Needed Upon Discharge  none   Discharge Barriers Identified None   Post-Acute Status   Post-Acute Authorization Placement   Post-Acute Placement Status Referrals Sent     Following discussion with Jaime cancino, referral submitted to [facility] for SNF/NH placement. Initiated 142/PASRR process. Left message for [facility] admissions requesting protocol for Covid positive pts.

## 2023-03-27 NOTE — PLAN OF CARE
TRANSITION OF CARE PROGRESS NOTE    I have received checkout from Dr. Quintero regarding this patient.     HO 1 assessment:  Admission diagnosis: weakness, NSTEMI  Overnight management: COVID positive: airborne and contact droplet isolation. Monitor O2 requirements, currently satting 97% on RA. Pending SNF placement, CM following. Dced Rocephin, VENKATESH.   Code status: Full    Please call Dr. Zarate [phone] from 03/27/2023 4PM to 3/28/23 7AM if any issues arise.    Mary Mckeon  [facility] HO-1

## 2023-03-27 NOTE — PT/OT/SLP EVAL
Physical Therapy Evaluation       Patient Name: Michelle Saenz   MRN: [id number]  Recent Surgery: * No surgery found *      Recommendations:     Discharge Recommendations: nursing facility, skilled   Discharge Equipment Recommendations: none has all DME  Barriers to discharge: Increased level of assist    Assessment:     Michelle Saenz is a 89 y.o. female admitted with a medical diagnosis of   1. Urinary tract infection without hematuria, site unspecified    2. COVID-19    3. Chronic kidney disease, unspecified CKD stage    4. NSTEMI (non-ST elevated myocardial infarction)    5. Moderate dementia associated with other underlying disease, without behavioral disturbance, psychotic disturbance, mood disturbance, or anxiety    6. Spondylosis of lumbar spine    7. Vitamin D deficiency    8. Type 2 diabetes mellitus with hyperglycemia, with long-term current use of insulin    9. Weakness    10. Fatigue, unspecified type    11. Acute cough    12. Pharyngitis, unspecified etiology    13. Elevated troponin    Per medical record  Pt was brought to the ED by EMS on 3/24/2023  with a primary complaint of Dizziness. Spoke with pt  over the phone who reported that pt has been dizzy for about 5 days with decreased PO intake. Pt looks sleepy during my encounter with waxes and wanes in mentation which her  confirmed to be her baseline.   Pt is alert and following commands.OT present for eval. She is limited by LE painLLE > RLE. She reports her nice and nephew assist at home and may require placement pending progress during hospitalization.    She presents with the following impairments/functional limitations: weakness, impaired endurance, decreased ROM, impaired self care skills, impaired functional mobility, gait instability, impaired balance, pain, decreased lower extremity function.     Rehab Prognosis: Fair; patient would benefit from acute PT services to address these deficits and reach maximum level of  function.    Plan:     During this hospitalization, patient to be seen  (3-5 x week) to address the above listed problems via gait training, therapeutic activities, therapeutic exercises    Plan of Care Expires: 04/26/23    Subjective     Chief Complaint: LE pain  Patient Comments/Goals: to get better  Pain/Comfort:  Pain Rating 1: 6/10  Location - Side 1: Left  Location 1: leg  Pain Addressed 1: Reposition, Cessation of Activity, Nurse notified, Distraction  Pain Rating Post-Intervention 1: 5/10    Social History:  Living Environment: Patient lives with their spouse in a single story home with ramped enterance  Prior Level of Function: Prior to admission, patient requires assistance with ADLs including spongebaths by niece and she reported her nephew comes over to help her walk to the bathroom  and ambulated household distances using rollator with family assist  Equipment Used at Home: wheelchair, shower chair, rollator, bedside commode  DME owned (not currently used): shower chair  Assistance Upon Discharge:  niece and nephew were helping    Objective:     Communicated with nurse patrick prior to session. Patient found HOB elevated with castillo catheter, telemetry, peripheral IV, oxygen, bed alarm, pulse ox (continuous) upon PT entry to room.    General Precautions: Standard, fall, droplet, contact, airborne   Orthopedic Precautions: N/A   Braces: N/A    Respiratory Status: Room air   Pre session vitals Post session vitals   BP mmHG 175/68 130/65   HR bpm 80 80   Ox sats % 100 100       Exams:  Cognition: Patient is oriented to Person and place, month and year with binary choice  RLE ROM: WFL except L knee 2* pain  RLE Strength:  at min 3/5 for major muscle groups  LLE ROM: WFL except L knee; decr due to pain  LLE Strength:  at min 3/5 for major muscle groups with L knee 3-/5  Sensation:    -       Intact  light/touch BUE / LE    Functional Mobility:  Gait belt applied - No  Bed Mobility  Supine to Sit: maximal  assistance and of 2 persons for LE management, trunk management, and increased fear of falling upon getting ot EOB  Sit to Supine: maximal assistance for LE management and trunk management  Transfers  Sit to Stand: moderate assistance with rolling walker  Gait  Patient ambulated side steps with rolling walker and moderate assistance. Patient demonstrates decreased step length, decreased weight shift, decreased foot clearance, and antalgic gait. . All lines remained intact throughout ambulation trail. Pt limited by pain  Balance  Sitting: contact guard assistance and decreased assist once positioned properly on EOB  Standing: minimum assistance      Therapeutic Activities and Exercises:   Patient educated on role of acute care PT and PT POC, safety while in hospital including calling nurse for mobility, and call light usage      Patient left HOB elevated with all lines intact, call button in reach, RN notified, and set up for breakfast. Nurse patrick was noted bed alarm was not on after session .    GOALS:   Multidisciplinary Problems       Physical Therapy Goals          Problem: Physical Therapy    Goal Priority Disciplines Outcome Goal Variances Interventions   Physical Therapy Goal     PT, PT/OT      Description: Goals to be met by: discharge     Patient will increase functional independence with mobility by performin. Supine to sit with MInimal Assistance  2. Sit to supine with MInimal Assistance  3. Sit to stand transfer with Minimal Assistance  4. Bed to chair transfer with Minimal Assistance using Rolling Walker  5. Gait  x 50 feet with Minimal Assistance using Rolling Walker.                          History:     Past Medical History:   Diagnosis Date    Dementia 2022    See 2022 note    Depression 2022    Dysphagia 2022    mild-mod dysmotility 3/2021 esophagram    Gastroesophageal reflux disease 2022    Hyperlipidemia 2022    Hypertension 2022    MAGDY (iron  deficiency anemia) 6/30/2022    MAGDY: FIT neg 2020, no prior EGD/colon, no weight loss or GI s/s, will monitor and consider but not strong candidate due to age and comorbidities, cont iron, Hb 11 [place] [facility]    Insomnia 6/20/2022    Osteoarthritis of both knees 6/20/2022    Moderate DJD per 9/2021 knee x-rays    Osteopenia 6/20/2022    hip T -2.1 9/2021, declined treatment    Spondylosis of lumbar spine 6/20/2022    Type 2 diabetes mellitus 6/20/2022    Vitamin D deficiency 6/20/2022       Past Surgical History:   Procedure Laterality Date    CYSTOSCOPY  2018       Time Tracking:     PT Received On: 03/27/23  PT Start Time: 0836  PT Stop Time: 0907  PT Total Time (min): 31 min     Billable Minutes: Evaluation 31    3/27/2023

## 2023-03-27 NOTE — PROGRESS NOTES
Pt denied placement at [facility]. Referral submitted to [facility] via [facility].

## 2023-03-27 NOTE — PROGRESS NOTES
"[facility] FM Progress Note     Resident Team: [facility] Family Medicine List   Attending Physician: Sandeep TURNER   Resident: Leon TURNER, Joby TURNER     Subjective:      Brief HPI: Dany is a 89 y.o.  female with a history of dementia, HTN, HLD, MAGDY, DMII, CKD stage 3, B/L knee OA brought to the ED by EMS on 3/24/2023  with a primary complaint of Dizziness. Spoke with pt  over the phone who reported that pt has been dizzy for about 5 days with decreased PO intake. Pt looks sleepy during my encounter with waxes and wanes in mentation which her  confirmed to be her baseline. Had GCS of 14 and fingerstick CBG of 240 in route to the ED. She lives with  and has HH. She is wheelchair bound at home and ambulates with assistance.  reports no falls or trauma. Pt denies chest pain, SOB, abdominal pain.     Upon arrival to the ED, VSS; satturated at 96% on RA. Laboratory results reveals H/H of 11.9/31.9 which is abound baseline, Cr 1.62, trop 0.2228, .5. UA with WBC, LE and trace bacteria. Tested positive for COVID. CT head and CXR negative for any abnormality. EKG with NSR and PAC. Family medicine team consulted for admission for NSTEMI.     Interval History 3/27/23: NAEON. Doing well, O2 >97% on RA. Alert and oriented x2 this AM, no acute complaints . Case management is working on placement. 2L UOP yesterday, castillo in place. Tolerating diet but decreased appetite per patient.       Objective:     Last 24 Hour Vital Signs:  BP  Min: 136/68  Max: 182/81  Temp  Av °F (36.7 °C)  Min: 97.5 °F (36.4 °C)  Max: 98.5 °F (36.9 °C)  Pulse  Av  Min: 72  Max: 79  Resp  Av.7  Min: 18  Max: 20  SpO2  Av.7 %  Min: 93 %  Max: 99 %  Height  Av' 5" (165.1 cm)  Min: 5' 5" (165.1 cm)  Max: 5' 5" (165.1 cm)  Weight  Av.1 kg (137 lb)  Min: 62.1 kg (137 lb)  Max: 62.1 kg (137 lb)  I/O last 3 completed shifts:  In: 1200 [P.O.:1200]  Out: 4000 [Urine:4000]    Physical Examination:  General: Obese, " elderly female, in no distress   Respiratory: Lungs CTAB, no wheezes/rales/rhonchi, non labored respirations   Cardio: RRR, 2/6 systolic ejection murmur heard throughout, no gallop/rubs  Abdomen: Soft, non tender, non distended, +BS   Extremities: No lower extremity edema     Current Medications:     Infusions:       Scheduled:   amLODIPine  5 mg Oral Daily    aspirin  81 mg Oral Daily    atorvastatin  20 mg Oral QHS    cefTRIAXone (ROCEPHIN) IVPB  1 g Intravenous Q24H    dextromethorphan-guaiFENesin  mg/5 ml  10 mL Oral Q6H    ferrous sulfate  1 tablet Oral Every other day    heparin (porcine)  5,000 Units Subcutaneous Q12H    insulin detemir U-100  10 Units Subcutaneous QHS    memantine  5 mg Oral BID    metoprolol tartrate  50 mg Oral BID    mupirocin   Nasal BID    pantoprozole (PROTONIX) IV  40 mg Intravenous Daily        PRN:  acetaminophen, dextrose 10%, dextrose 10%, dextrose, dextrose, dextrose 50%, dextrose 50%, glucagon (human recombinant), hydrALAZINE, insulin aspart U-100, naloxone, nitroGLYCERIN, sodium chloride 0.9%    Assessment & Plan:     Elevated troponin   +COVID Pneumonitis        - Likely demand ischemia 2/2 to CKD, dehydration/COVID       -Trop of 0.228 on admission       - Loaded with aspirin 325 oral in the ED       - Trop trending down 0.228 -0.214- 0.172, no longer trended        - Switch from heparin gtt to heparin subq 5000 unit BID 3/25/23       -Continue aspirin 81mg daily       -Continue cardiac monitoring       -Continue airborne and contact droplet isolation       -Remains on RA, saturated at 96-97%, no respiratory issues      UTI       - UA with trace bacteria, WBC and 250 LE        - Completed 3 days IV Rocephin, will discontinue        - Urine Cx reviewed      CKD, acute on chronic- Resolved        -Likely 2/2 to dehydration       -Crt at baseline, IVF discontinued      Hx of Diastolic dysfunction      -Echo in 2021 revealed Ef 60% with diastolic dysfunction      -BNP of  334 on admission      -Repeat ECHO pending      Hx of MAGDY       -H/H 11.5/37.1 on admission. Now 11.3/35.5        -Restarted on home ferrous sulfate 1 tablet qd       -continue to monitor     DMII       - On Lantus 30U subq Q am and 22U q PM, metformin 500mg BID at home       - Continue LD SSI        - Detemir 10 units started on admission        - Will increase to home 22U at bedtime, consider adding back AM pending CBGs        - POCT glucose Q6hrs     HTN       -BP at goal        -Continue home lopressor 50mg, amlodipine 5 mg, and hydralazine 5mg IV prn       -Continue to Monitor BP     HLD       -Lipid panel 4 yrs ago wnl        -On formulary atorvastatin 20mg qd     Hx of dementia        - Will restart home Namenda         - Mentation is at baseline         CODE STATUS: full  Access: PIV  Antibiotics: None   Diet: Heart healthy  DVT Prophylaxis: Heparin   GI Prophylaxis: Protonix  Fluids: None       Disposition: Pending SNF placement, CM following.      Leon TURNER HO-III [facility]

## 2023-03-27 NOTE — PROGRESS NOTES
Contacted by Les Fajardo with [facility] who stated they have no isolation beds at this time; therefore, would not be able to accept pt until 4/4/2023. Pt was previously referred for SNF at prior admit, but was denied by insurance. As I was unable to reach spouse, informed nephew, Lee, of status and requested he work on obtaining pt's bank statements for alf placement in the event insurance denies again. Nephew acknowledged understanding and agreed to discuss with spouse,  Scotty.

## 2023-03-27 NOTE — PT/OT/SLP EVAL
Occupational Therapy   Evaluation    Name: Michelle Saenz  MRN: [id number]  Admitting Diagnosis: COVID 19, NSTEMI, UTI without hematuria, fatigue, lumbar spondylosis, CKD, moderate dementia  Recent Surgery: * No surgery found *      Recommendations:     Discharge Recommendations: nursing facility, skilled  Discharge Equipment Recommendations:  none  Barriers to discharge:  None    Assessment:     Michelle Saenz is a 89 y.o. female with a medical diagnosis as noted above.  She presents with mild confusion but cooperative, easily fatigued, c/o L knee pain with noted swelling (no redness or temperature noted to the area), with max A x2 sitting balance initially d/t pushing to R side with c/o fear of falling from EOB but improved to CGA with training over course of session, able to sidestep with mod A to HOB, max A to be positioned in bed. Performance deficits affecting function: weakness, impaired endurance, impaired self care skills, impaired functional mobility, impaired balance, impaired cognition, decreased lower extremity function, decreased safety awareness, pain, decreased ROM, impaired fine motor, edema.      Rehab Prognosis: Fair; patient would benefit from acute skilled OT services to address these deficits and reach maximum level of function.       Plan:     Patient to be seen  (2-5x/wk, M-F) to address the above listed problems via self-care/home management, therapeutic activities, therapeutic exercises  Plan of Care Expires:  (DC)  Plan of Care Reviewed with: patient    Subjective     Chief Complaint: L knee pain, general fatigue  Patient/Family Comments/goals: to get better    Occupational Profile:  note:  provided by pt who is an unreliable historian, will f/u with family when able.  Living Environment: [facility] with , ramp to enter home, pt primarily using WC and reported she is able to tf in and out of WC unassisted, niece assists with sponge baths  Previous level of function: min/mod A  with ADLs (mod A sponge baths, min A LB dressing, SBA toileting, Setup for feeding and grooming), SBA for tf to and from   Roles and Routines: family assists with ADLs and provides all IADLs assist (cooking, cleaning, transportation)  Equipment Used at Home: wheelchair, shower chair, rollator, bedside commode  Assistance upon Discharge: recommend pt DC to higher level of care to return to PLOF:  SNF    Pain/Comfort:  Pain Rating 1: 6/10  Location - Side 1: Left  Location 1: knee  Pain Addressed 1: Pre-medicate for activity, Distraction, Nurse notified  Pain Rating Post-Intervention 1: 5/10    Patients cultural, spiritual, Pentecostal conflicts given the current situation: no    Objective:     Communicated with: nurse Enriquez prior to session.  Patient found HOB elevated with castillo catheter, peripheral IV, pulse ox (continuous), oxygen, telemetry, bed alarm upon OT entry to room.    General Precautions: Standard, fall, contact, airborne  Orthopedic Precautions: N/A  Braces: N/A  Respiratory Status: Room air  Initial vital signs:  HR 80, O2 sat 100%, /68  Final vital signs:  HR 80, O2 sat 100%, /65    Occupational Performance:    Bed Mobility:    Patient completed Rolling/Turning to Right with maximal assistance  Patient completed Scooting/Bridging with maximal assistance  Patient completed Supine to Sit with maximal assistance, 2 persons, and limited by c/o fear of falling from EOB  Patient completed Sit to Supine with maximal assistance    Functional Mobility/Transfers:  Patient completed Sit <> Stand Transfer with moderate assistance  with  rolling walker   Functional Mobility: mod A using RW to sidestep 2 feet to HOB, limited by avoiding weightbearing at LLE    Activities of Daily Living:  Feeding:  stand by assistance with setup of items and cues to initiate tasks  Grooming: minimum assistance occasional touching assist to wipe face  Lower Body Dressing: maximal assistance pt able to demonstrate  doffing L sock seated EOB but unable to don, limited by c/o L knee pain with mobility and with weakness limiting activity seated EOB  Toileting: total assistance with Magdaleno and uanble to tolerate sitting up to BSC at this time    Cognitive/Visual Perceptual:  Cognitive/Psychosocial Skills:     -       Oriented to: Person and Place   -       Follows Commands/attention:Easily distracted and Follows one-step commands  -       Safety awareness/insight to disability: intact     Physical Exam:  BUE AROM WFL, strength 3/5 at shoulders, 3+/5 all joints distal, limited by weakness and easily fatigued    AMPAC 6 Click ADL:  AMPAC Total Score:      Treatment & Education:  Pt educated on OT goals and POC, effects of Covid infection on alertness/arousal levels, and use of call bell for assist with all needs.  Pt verbalized understanding, will need reinforcement d/t cognitive deficits.    Patient left HOB elevated with all lines intact, call button in reach, and nurse notified that pt's bed alarm was not set before OT/PT left the room; nurse stated she would reset.    GOALS:   Multidisciplinary Problems       Occupational Therapy Goals          Problem: Occupational Therapy    Goal Priority Disciplines Outcome Interventions   Occupational Therapy Goal     OT, PT/OT Ongoing, Progressing    Description: Patient will perform feeding with setup seated EOB or at recliner chair with good tolerance for sitting up OOB 2 hours.     Patient will perform grooming with setup assist while at sink at WC level.    Patient will perform toileting with min assist using BSC.    Patient will perform toilet transfer with CG assist with use of RW.    Patient will perform upper body dressing with setup assist while seated EOB.                              History:     Past Medical History:   Diagnosis Date    Dementia 6/30/2022    See 6/30/2022 note    Depression 6/30/2022    Dysphagia 6/20/2022    mild-mod dysmotility 3/2021 esophagram     Gastroesophageal reflux disease 6/20/2022    Hyperlipidemia 6/20/2022    Hypertension 6/20/2022    MAGDY (iron deficiency anemia) 6/30/2022    MAGDY: FIT neg 2020, no prior EGD/colon, no weight loss or GI s/s, will monitor and consider but not strong candidate due to age and comorbidities, cont iron, Hb 11 on 5/2022 labs at [facility]    Insomnia 6/20/2022    Osteoarthritis of both knees 6/20/2022    Moderate DJD per 9/2021 knee x-rays    Osteopenia 6/20/2022    hip T -2.1 9/2021, declined treatment    Spondylosis of lumbar spine 6/20/2022    Type 2 diabetes mellitus 6/20/2022    Vitamin D deficiency 6/20/2022         Past Surgical History:   Procedure Laterality Date    CYSTOSCOPY  2018       Time Tracking:     OT Date of Treatment: 03/27/23  OT Start Time: 0836  OT Stop Time: 0907  OT Total Time (min): 31 min    Billable Minutes:Evaluation 31    3/27/2023

## 2023-03-27 NOTE — PLAN OF CARE
Problem: Occupational Therapy  Goal: Occupational Therapy Goal  Description: Patient will perform feeding with setup seated EOB or at recliner chair with good tolerance for sitting up OOB 2 hours.     Patient will perform grooming with setup assist while at sink at WC level.    Patient will perform toileting with min assist using BSC.    Patient will perform toilet transfer with CG assist with use of RW.    Patient will perform upper body dressing with setup assist while seated EOB.         Outcome: Ongoing, Progressing

## 2023-03-27 NOTE — PROGRESS NOTES
Inpatient Nutrition Assessment    Admit Date: 3/24/2023   Total duration of encounter: 3 days     Nutrition Recommendation/Prescription     Will change diet to cardiac/no concentrated sweets (hx DM)  Boost Glucose Control (provides 250 kcal, 14 g protein per serving)   3. MVI/fe  4. Biweekly wt  5. If appetite remains --decreased--consider megace  Will monitor nutrition status   Communication of Recommendations: reviewed with nurse    Nutrition Assessment     Malnutrition Assessment/Nutrition-Focused Physical Exam    Malnutrition in the context of acute illness or injury  Degree of Malnutrition: severe malnutrition  Energy Intake: </= 50% of estimated energy requirement for >/= 5 days  Interpretation of Weight Loss: >5% in 1 month  Body Fat:unable to obtain  Area of Body Fat Loss: unable to obtain  Muscle Mass Loss: unable to obtain  Area of Muscle Mass Loss: unable to obtain  Fluid Accumulation: unable to obtain  Edema: unable to obtain   Reduced  Strength: unable to obtain  A minimum of two characteristics is recommended for diagnosis of either severe or non-severe malnutrition.    Chart Review    Reason Seen: continuous nutrition monitoring    Malnutrition Screening Tool Results   Have you recently lost weight without trying?: No  Have you been eating poorly because of a decreased appetite?: No   MST Score: 0     Diagnosis:  NSTEMI, COVID +, UTI, CKD, CHF, MAGDY, DM, HTN, HLD, dementia    Relevant Medical History: dementia, HTN, HLD, MAGDY, DM, CKD    Nutrition-Related Medications: ASA, atorvastatin, rocephin, FeSO4, insulin pantoprazole   Calorie Containing IV Medications: no significant kcals from medications at this time    Nutrition-Related Labs:  (3-27) H?H 12.2/39.3 Gluc 307(H) Bun 15.8 Cr 1.0 GFR 49     Diet/PN Order: Diet heart healthy  Oral Supplement Order: none  Tube Feeding Order: none  Appetite/Oral Intake: poor/25-50% of meals  Factors Affecting Nutritional Intake: decreased  "appetite  Food/Lutheran/Cultural Preferences: none reported  Food Allergies: none reported    Skin Integrity: intact  Wound(s):   none reported     Comments    (3/27) Pt with decreased po intake approx week; ate 25% breakfast/drank milk ; will order boost shake for added nutrition. Pt unsure UBW--current wt 62.1kg; wt 2 weeks ago 74kg--? 17% wt loss; will track wts. Encourage oral intake; consider megace if appetite remains decreased. Gluc (H)--hx DM--will add no concentrated sweets.     Anthropometrics    Height: 5' 5" (165.1 cm)    Last Weight: 62.1 kg (137 lb) (23 0747) Weight Method: Bed Scale  BMI (Calculated): 22.8  BMI Classification: normal (BMI 18.5-24.9)     Ideal Body Weight (IBW), Female: 125 lb     % Ideal Body Weight, Female (lb): 109.6 %                    Usual Body Weight (UBW), kg:  (pt unsure UBW--EMR --prior wts 160-165#; will track)        Usual Weight Provided By: EMR weight history    Wt Readings from Last 5 Encounters:   23 62.1 kg (137 lb)   23 74.8 kg (165 lb)   23 75.5 kg (166 lb 7.2 oz)   23 76 kg (167 lb 8.8 oz)   22 76.9 kg (169 lb 8.5 oz)     Weight Change(s) Since Admission:  Admit Weight: 94.3 kg (208 lb) (23 1605)  3/27--today wt 62.1kg ; will track wts--prior wt approx 74kg.     Estimated Needs    Weight Used For Calorie Calculations: 62.1 kg (136 lb 14.5 oz)  Energy Calorie Requirements (kcal): 1552 kcal/d; 25 zulema/kg  Energy Need Method: Kcal/kg  Weight Used For Protein Calculations: 62.1 kg (136 lb 14.5 oz)  Protein Requirements: 80 gm protein/d; 1.3 gm/kg  Fluid Requirements (mL): 1552 ml/d; 1ml/zulema  Temp (24hrs), Av °F (36.7 °C), Min:97.5 °F (36.4 °C), Max:98.5 °F (36.9 °C)         Enteral Nutrition    Patient not receiving enteral nutrition at this time.    Parenteral Nutrition    Patient not receiving parenteral nutrition support at this time.    Evaluation of Received Nutrient Intake    Calories: not meeting estimated " needs  Protein: not meeting estimated needs    Patient Education    Not applicable.    Nutrition Diagnosis     PES: Malnutrition related to acute illness as evidenced by poor appetite approx week;  Wt loss--.> 5% month (new)    Interventions/Goals     Intervention(s): modified composition of meals/snacks, commercial beverage, multivitamin/mineral supplement therapy, and collaboration with other providers  Goal: Meet greater than 75% of nutritional needs by follow-up. (new)    Monitoring & Evaluation     Dietitian will monitor food and beverage intake, weight, and glucose/endocrine profile.  Nutrition Risk/Follow-Up: high (follow-up in 1-4 days)   Please consult if re-assessment needed sooner.

## 2023-03-28 LAB
ALBUMIN SERPL-MCNC: 3 G/DL (ref 3.4–4.8)
ALBUMIN/GLOB SERPL: 0.8 RATIO (ref 1.1–2)
ALP SERPL-CCNC: 91 UNIT/L (ref 40–150)
ALT SERPL-CCNC: 13 UNIT/L (ref 0–55)
AST SERPL-CCNC: 19 UNIT/L (ref 5–34)
BILIRUBIN DIRECT+TOT PNL SERPL-MCNC: 0.4 MG/DL
BUN SERPL-MCNC: 22.2 MG/DL (ref 9.8–20.1)
CALCIUM SERPL-MCNC: 9.5 MG/DL (ref 8.4–10.2)
CHLORIDE SERPL-SCNC: 102 MMOL/L (ref 98–111)
CO2 SERPL-SCNC: 23 MMOL/L (ref 23–31)
CREAT SERPL-MCNC: 1.41 MG/DL (ref 0.55–1.02)
GFR SERPLBLD CREATININE-BSD FMLA CKD-EPI: 35 MLS/MIN/1.73/M2
GLOBULIN SER-MCNC: 4 GM/DL (ref 2.4–3.5)
GLUCOSE SERPL-MCNC: 339 MG/DL (ref 75–121)
PHOSPHATE SERPL-MCNC: 2.6 MG/DL (ref 2.3–4.7)
POCT GLUCOSE: 188 MG/DL (ref 70–110)
POCT GLUCOSE: 243 MG/DL (ref 70–110)
POCT GLUCOSE: 311 MG/DL (ref 70–110)
POCT GLUCOSE: 352 MG/DL (ref 70–110)
POTASSIUM SERPL-SCNC: 4.3 MMOL/L (ref 3.5–5.1)
PROT SERPL-MCNC: 7 GM/DL (ref 5.8–7.6)
SODIUM SERPL-SCNC: 134 MMOL/L (ref 132–146)

## 2023-03-28 PROCEDURE — 84100 ASSAY OF PHOSPHORUS: CPT | Performed by: NURSE PRACTITIONER

## 2023-03-28 PROCEDURE — 25000003 PHARM REV CODE 250: Performed by: FAMILY MEDICINE

## 2023-03-28 PROCEDURE — 93005 ELECTROCARDIOGRAM TRACING: CPT

## 2023-03-28 PROCEDURE — 94761 N-INVAS EAR/PLS OXIMETRY MLT: CPT

## 2023-03-28 PROCEDURE — 25000003 PHARM REV CODE 250: Performed by: NURSE PRACTITIONER

## 2023-03-28 PROCEDURE — 80053 COMPREHEN METABOLIC PANEL: CPT | Performed by: NURSE PRACTITIONER

## 2023-03-28 PROCEDURE — 63600175 PHARM REV CODE 636 W HCPCS: Performed by: STUDENT IN AN ORGANIZED HEALTH CARE EDUCATION/TRAINING PROGRAM

## 2023-03-28 PROCEDURE — 27000207 HC ISOLATION

## 2023-03-28 PROCEDURE — 63600175 PHARM REV CODE 636 W HCPCS: Performed by: NURSE PRACTITIONER

## 2023-03-28 PROCEDURE — 63600175 PHARM REV CODE 636 W HCPCS

## 2023-03-28 PROCEDURE — 25000003 PHARM REV CODE 250: Performed by: STUDENT IN AN ORGANIZED HEALTH CARE EDUCATION/TRAINING PROGRAM

## 2023-03-28 PROCEDURE — 21400001 HC TELEMETRY ROOM

## 2023-03-28 RX ORDER — TALC
6 POWDER (GRAM) TOPICAL NIGHTLY PRN
Status: DISCONTINUED | OUTPATIENT
Start: 2023-03-28 | End: 2023-03-28

## 2023-03-28 RX ORDER — PANTOPRAZOLE SODIUM 40 MG/1
40 TABLET, DELAYED RELEASE ORAL DAILY
Status: DISCONTINUED | OUTPATIENT
Start: 2023-03-28 | End: 2023-04-05 | Stop reason: HOSPADM

## 2023-03-28 RX ORDER — HALOPERIDOL 1 MG/1
1 TABLET ORAL EVERY 12 HOURS PRN
Status: DISCONTINUED | OUTPATIENT
Start: 2023-03-28 | End: 2023-04-05 | Stop reason: HOSPADM

## 2023-03-28 RX ORDER — RISPERIDONE 0.25 MG/1
0.25 TABLET ORAL ONCE
Status: DISCONTINUED | OUTPATIENT
Start: 2023-03-28 | End: 2023-03-28

## 2023-03-28 RX ORDER — TALC
9 POWDER (GRAM) TOPICAL NIGHTLY PRN
Status: DISCONTINUED | OUTPATIENT
Start: 2023-03-28 | End: 2023-04-05 | Stop reason: HOSPADM

## 2023-03-28 RX ADMIN — INSULIN DETEMIR 20 UNITS: 100 INJECTION, SOLUTION SUBCUTANEOUS at 09:03

## 2023-03-28 RX ADMIN — HALOPERIDOL 1 MG: 1 TABLET ORAL at 02:03

## 2023-03-28 RX ADMIN — AMLODIPINE BESYLATE 5 MG: 5 TABLET ORAL at 09:03

## 2023-03-28 RX ADMIN — INSULIN ASPART 4 UNITS: 100 INJECTION, SOLUTION INTRAVENOUS; SUBCUTANEOUS at 05:03

## 2023-03-28 RX ADMIN — MUPIROCIN: 20 OINTMENT TOPICAL at 08:03

## 2023-03-28 RX ADMIN — MEMANTINE 5 MG: 5 TABLET ORAL at 08:03

## 2023-03-28 RX ADMIN — GUAIFENESIN AND DEXTROMETHORPHAN 10 ML: 100; 10 SYRUP ORAL at 05:03

## 2023-03-28 RX ADMIN — Medication 9 MG: at 03:03

## 2023-03-28 RX ADMIN — INSULIN DETEMIR 22 UNITS: 100 INJECTION, SOLUTION SUBCUTANEOUS at 09:03

## 2023-03-28 RX ADMIN — METOPROLOL TARTRATE 50 MG: 50 TABLET, FILM COATED ORAL at 08:03

## 2023-03-28 RX ADMIN — GUAIFENESIN AND DEXTROMETHORPHAN 10 ML: 100; 10 SYRUP ORAL at 11:03

## 2023-03-28 RX ADMIN — PANTOPRAZOLE SODIUM 40 MG: 40 TABLET, DELAYED RELEASE ORAL at 09:03

## 2023-03-28 RX ADMIN — INSULIN ASPART 5 UNITS: 100 INJECTION, SOLUTION INTRAVENOUS; SUBCUTANEOUS at 11:03

## 2023-03-28 RX ADMIN — METOPROLOL TARTRATE 50 MG: 50 TABLET, FILM COATED ORAL at 09:03

## 2023-03-28 RX ADMIN — MUPIROCIN: 20 OINTMENT TOPICAL at 09:03

## 2023-03-28 RX ADMIN — HEPARIN SODIUM 5000 UNITS: 5000 INJECTION INTRAVENOUS; SUBCUTANEOUS at 08:03

## 2023-03-28 RX ADMIN — ATORVASTATIN CALCIUM 20 MG: 20 TABLET, FILM COATED ORAL at 08:03

## 2023-03-28 RX ADMIN — MEMANTINE 5 MG: 5 TABLET ORAL at 09:03

## 2023-03-28 RX ADMIN — ASPIRIN 81 MG: 81 TABLET, COATED ORAL at 09:03

## 2023-03-28 RX ADMIN — HEPARIN SODIUM 5000 UNITS: 5000 INJECTION INTRAVENOUS; SUBCUTANEOUS at 09:03

## 2023-03-28 NOTE — PT/OT/SLP PROGRESS
Physical Therapy  Missed Treatment Note    Patient Name:  Michelle Saenz   MRN:  [id number]    Patient not seen today secondary to Nurse/ MARLYN hold, Other (Comment) (PT attempted session however nurse requested PT hold at this time as she was trying to ge pt to settle down to sleep. pt did not sleep last night). Will follow-up as scheduled.

## 2023-03-28 NOTE — SIGNIFICANT EVENT
Called by nurse to bedside at approx 2300 for concerns of worsening confusion and possible sundowning associated with agitation. Upon arrival, pt reported to have ripped IV out and only way to calm down is with constant attention from nursing staff to continuously talk to pt. Attempted to get sitter with pt, however, due to +covid status, per hospital policy, sitters not allowed with pt. Family is allowed to stay with pt, however. Attempted to call numbers on file in pt's chart - was able to contact pt's nephew Mr. Lee Kaufman who states he also has covid. Discussed possibility og him coming to the hospital and he states someone would be able to come in the am, however, not at the time. Attempted redirecting again with pt refusing to take melatonin or cooperate to lay in bed with preference to stay upright, however, pt would then attempt to stand up to walk. Currently thinks she is downstairs at her home and trying to go to bed upstairs, however, when standing, pt unable to walk on own and is a high fall risk. Consideration for antipsychotic medications for delirium given, new ekg obtained with Qtc 557. Unable to safely administer at this time. Pt did eventually agree to take melatonin. Soft restraints ordered, however, prior to placement, pt was seated at side of bed and remaining calm. Will postpone restraint placement at this time. Plan discussed with nursing staff who confirmed understanding of plan.

## 2023-03-28 NOTE — PROGRESS NOTES
[facility] FM Progress Note     Resident Team: [facility] Family Medicine List   Attending Physician: Sandeep TURNER   Resident: Leon TURNER, Joby TURNER     Subjective:      Brief HPI: Dany is a 89 y.o.  female with a history of dementia, HTN, HLD, MAGDY, DMII, CKD stage 3, B/L knee OA brought to the ED by EMS on 3/24/2023  with a primary complaint of Dizziness. Spoke with pt  over the phone who reported that pt has been dizzy for about 5 days with decreased PO intake. Pt looks sleepy during my encounter with waxes and wanes in mentation which her  confirmed to be her baseline. Had GCS of 14 and fingerstick CBG of 240 in route to the ED. She lives with  and has HH. She is wheelchair bound at home and ambulates with assistance.  reports no falls or trauma. Pt denies chest pain, SOB, abdominal pain.     Upon arrival to the ED, VSS; satturated at 96% on RA. Laboratory results reveals H/H of 11.9/31.9 which is abound baseline, Cr 1.62, trop 0.2228, .5. UA with WBC, LE and trace bacteria. Tested positive for COVID. CT head and CXR negative for any abnormality. EKG with NSR and PAC. Family medicine team consulted for admission for NSTEMI.     Interval History 3/28/23: VSS, afebrile, doing well from a respiratory standpoint. Had significant sundowning and agitated delirium overnight, pulled her IV accessa and was attempting ot get out of bed despite multiple attempt at re-direction and delirium precautions. She is cleared from a medical standpoint and is awaiting approval for SNF placement. I called and spoke with her  Scotty in regards to her delirium and to see if they would take patient back home since they were taking care of her prior to hospitalization w/ HH. He reports that he and nephew do not feel comfortable taking her home given that they are both sick with COVID as well. He reports that she experiences similar symptoms of agitation, confusion especially at nighttime at home as well.      Objective:     Last 24 Hour Vital Signs:  BP  Min: 130/65  Max: 187/68  Temp  Av.8 °F (36.6 °C)  Min: 97.5 °F (36.4 °C)  Max: 98.3 °F (36.8 °C)  Pulse  Av.4  Min: 64  Max: 87  Resp  Av.7  Min: 18  Max: 20  SpO2  Av.4 %  Min: 95 %  Max: 100 %  I/O last 3 completed shifts:  In: 530 [P.O.:480; IV Piggyback:50]  Out: 2150 [Urine:2150]    Physical Examination:  General: Obese, elderly female, in no distress   Respiratory: Lungs CTAB, no wheezes/rales/rhonchi, non labored respirations   Cardio: RRR, 2/6 systolic ejection murmur heard throughout, no gallop/rubs  Abdomen: Soft, non tender, non distended, +BS   Extremities: No lower extremity edema   Neuro: Alert and oriented x2, not to time. Able to answer most questions appropriately, calm resting in bed. No focal neurologic deficits observed     Current Medications:     Infusions:       Scheduled:   amLODIPine  5 mg Oral Daily    aspirin  81 mg Oral Daily    atorvastatin  20 mg Oral QHS    dextromethorphan-guaiFENesin  mg/5 ml  10 mL Oral Q6H    ferrous sulfate  1 tablet Oral Every other day    heparin (porcine)  5,000 Units Subcutaneous Q12H    insulin detemir U-100  20 Units Subcutaneous Daily    insulin detemir U-100  22 Units Subcutaneous QHS    memantine  5 mg Oral BID    metoprolol tartrate  50 mg Oral BID    mupirocin   Nasal BID    pantoprazole  40 mg Oral Daily        PRN:  acetaminophen, dextrose 10%, dextrose 10%, dextrose, dextrose, dextrose 50%, dextrose 50%, glucagon (human recombinant), hydrALAZINE, insulin aspart U-100, melatonin, naloxone, nitroGLYCERIN, sodium chloride 0.9%    Assessment & Plan:     Elevated troponin-Resolved    +COVID Pneumonitis        - Likely demand ischemia 2/2 to CKD, dehydration/COVID       -Trop of 0.228 on admission       - Loaded with aspirin 325 oral in the ED       - Trop trending down 0.228 -0.214- 0.172, no longer trended        - Switch from heparin gtt to heparin subq 5000 unit BID 3/25/23        -Continue aspirin 81mg daily       -Continue cardiac monitoring       -Continue airborne and contact droplet isolation       -Doing well from respiratory standpoint, saturating well on RA      UTI- Resolved        - UA with trace bacteria, WBC and 250 LE        - Completed 3 days IV Rocephin       - Urine Cx reviewed      CKD, acute on chronic- Resolved        -Likely 2/2 to dehydration       -Crt at baseline     Hx of Diastolic dysfunction      -Echo in 2021 revealed Ef 60% with diastolic dysfunction      -BNP of 334 on admission      -Repeat ECHO EF 55%, grade I DD, moderate AS      Hx of MAGDY       -H/H 11.5/37.1 on admission. Now 11.3/35.5        -Restarted on home ferrous sulfate 1 tablet qd       -continue to monitor     DMII       - On Lantus 30U subq Q am and 22U q PM, metformin 500mg BID at home       - Continue LD SSI        - Detemir 10 units started on admission        - Increased to home 22U at bedtime 3/27/23         - CBG remains significantly elevated , will add AM detemir 20U        - POCT glucose Q6hrs     HTN       -BP at goal        -Continue home lopressor 50mg, amlodipine 5 mg, and hydralazine 5mg IV prn       -Continue to Monitor BP     HLD       -Lipid panel 4 yrs ago wnl        -On formulary atorvastatin 20mg qd     Hx of dementia  Agitated delirium         - Restarted home Namenda 3/27/23         - Mentation is at baseline          - Multiple waxing/waning episodes of agitated delirium overnight          - Discussed with  in regard to discharge disposition and he reports similar symptoms at home           - Delirium precautions and modifications discussed with nursing, do not have staffing for 1:1 observation           - Consider Zyprexa 2.5mg PRN or Haldol 0.5mg for severe agitation. Monitor closely as Qtc is prolonged         CODE STATUS: full  Access: None   Antibiotics: None   Diet: Heart healthy  DVT Prophylaxis: Heparin   GI Prophylaxis: Protonix  Fluids: None        Disposition: Pending SNF placement, CM following.      Leon TURNER -III [facility]

## 2023-03-28 NOTE — PLAN OF CARE
Pt remained free from falls and injury throughout shift. Pt experienced severe confusion requiring persistent reorientation and education, attempting to get OOB throughout shift resulting in me sitting at the pt door through shift to monitor for safety. However pt responded well to PO Haldol. Pt family member to stay with pt for safety, thorough education performed on PPE requirements and infection control, will continue to educate as needed  Problem: Adult Inpatient Plan of Care  Goal: Plan of Care Review  Outcome: Ongoing, Progressing  Goal: Patient-Specific Goal (Individualized)  Outcome: Ongoing, Progressing  Goal: Absence of Hospital-Acquired Illness or Injury  Outcome: Ongoing, Progressing  Goal: Optimal Comfort and Wellbeing  Outcome: Ongoing, Progressing  Goal: Readiness for Transition of Care  Outcome: Ongoing, Progressing     Problem: Diabetes Comorbidity  Goal: Blood Glucose Level Within Targeted Range  Outcome: Ongoing, Progressing     Problem: Skin Injury Risk Increased  Goal: Skin Health and Integrity  Outcome: Ongoing, Progressing     Problem: Fall Injury Risk  Goal: Absence of Fall and Fall-Related Injury  Outcome: Ongoing, Progressing     Problem: Infection  Goal: Absence of Infection Signs and Symptoms  Outcome: Ongoing, Progressing     Problem: Restraint, Behavioral (Acute Care)  Goal: Absence of Harm or Injury  Outcome: Ongoing, Progressing

## 2023-03-28 NOTE — NURSING
"At 2348, upon entering pt's room to give pt midnight dose of dextromethorphan-guaifenesin. Pt was laying sideways in bed with legs dangling over railing. Pt stated and quote "I need to go to my house, I have business to tend to"; when assessing pt's orientation status pt states she is "at my brother's house". Pt also pulled out both IV accesses; stated "I don't need anymore IVs". Redirected pt and attempted to reposition back into bed. Pt then became agitated; stated "don't make me mad", balling fist, kicking legs. Used call light in room to ask another nurse, Kellie, for assistance. Charge nurse, Tory, notifies MD on-call, Dr. Zarate at this time. Charge nurse reports MD stated she will come see the pt. VS obtained at 0016; BP elevated at time due to pt agitation. Rechecked BP, currently 166/89. MD and charge nurse informed.     At 0104, second call attempt made to Dr. Zarate to assess pt. Dr. Zarate arrived on unit at 0125. At this time, Kellie was in room with pt keeping her calm. Informed MD that at least one nurse has stayed with pt since midnight; pt has been uncooperative; currently sitting on the side of the bed and attempted multiple times to get up and ambulate. Pt can pull self up to standing position; when standing gait is unsteady, legs wobble, and cannot ambulate with assist more than a few inches. Pt would sit down on side of bed a few seconds after getting up and states "I feel weak". When getting up pt repeats she "wants to go upstairs to her own bed". After many attempts at redirecting pt, pt continues to grow agitated and confused. MD placed orders for risperdone and melatonin; instructed to hold risperdone after EKG is obtained and not to give if Qtc is greater than 400. Unable to administer risperdone at this time; melatonin given at 0306.     Received MD orders for soft restraints at 0304. Pt sitting calmly on side of the bed at this time; pt instructed not to get up without calling for " "assistance. Pt stated understanding; however nurses concerned for pts periods of confusion. Concerns voiced to Dr. Zarate. Dr. Zarate instructed not to apply soft restraints at this time due to pts current calm appearance; MD stated "prefer to wait until pt tries to get up again". Dr. Zarate inquires about putting a chair in pts room at this time in hopes to get her to sit in chair instead of the side of bed. Informed MD concerns that pt cannot safely transfer to chair at this time.     Pt remains calm sitting on side of the bed, instructed not to get up without calling for assistance; instructed use of call light. Bed in lowest position; HOB elevated. Bed alarm set.   "

## 2023-03-29 LAB
ALBUMIN SERPL-MCNC: 2.7 G/DL (ref 3.4–4.8)
ALBUMIN/GLOB SERPL: 0.8 RATIO (ref 1.1–2)
ALP SERPL-CCNC: 82 UNIT/L (ref 40–150)
ALT SERPL-CCNC: 13 UNIT/L (ref 0–55)
AST SERPL-CCNC: 19 UNIT/L (ref 5–34)
BACTERIA BLD CULT: NORMAL
BACTERIA BLD CULT: NORMAL
BILIRUBIN DIRECT+TOT PNL SERPL-MCNC: 0.3 MG/DL
BUN SERPL-MCNC: 28 MG/DL (ref 9.8–20.1)
CALCIUM SERPL-MCNC: 9.4 MG/DL (ref 8.4–10.2)
CHLORIDE SERPL-SCNC: 107 MMOL/L (ref 98–111)
CO2 SERPL-SCNC: 26 MMOL/L (ref 23–31)
CREAT SERPL-MCNC: 1.09 MG/DL (ref 0.55–1.02)
GFR SERPLBLD CREATININE-BSD FMLA CKD-EPI: 47 MLS/MIN/1.73/M2
GLOBULIN SER-MCNC: 3.3 GM/DL (ref 2.4–3.5)
GLUCOSE SERPL-MCNC: 127 MG/DL (ref 75–121)
PHOSPHATE SERPL-MCNC: 2.9 MG/DL (ref 2.3–4.7)
POCT GLUCOSE: 101 MG/DL (ref 70–110)
POCT GLUCOSE: 180 MG/DL (ref 70–110)
POCT GLUCOSE: 191 MG/DL (ref 70–110)
POCT GLUCOSE: 270 MG/DL (ref 70–110)
POCT GLUCOSE: 87 MG/DL (ref 70–110)
POTASSIUM SERPL-SCNC: 4.1 MMOL/L (ref 3.5–5.1)
PROT SERPL-MCNC: 6 GM/DL (ref 5.8–7.6)
SODIUM SERPL-SCNC: 140 MMOL/L (ref 132–146)

## 2023-03-29 PROCEDURE — 80053 COMPREHEN METABOLIC PANEL: CPT | Performed by: NURSE PRACTITIONER

## 2023-03-29 PROCEDURE — 21400001 HC TELEMETRY ROOM

## 2023-03-29 PROCEDURE — 63600175 PHARM REV CODE 636 W HCPCS

## 2023-03-29 PROCEDURE — 25000003 PHARM REV CODE 250: Performed by: STUDENT IN AN ORGANIZED HEALTH CARE EDUCATION/TRAINING PROGRAM

## 2023-03-29 PROCEDURE — 97530 THERAPEUTIC ACTIVITIES: CPT

## 2023-03-29 PROCEDURE — 63600175 PHARM REV CODE 636 W HCPCS: Performed by: NURSE PRACTITIONER

## 2023-03-29 PROCEDURE — 25000003 PHARM REV CODE 250: Performed by: FAMILY MEDICINE

## 2023-03-29 PROCEDURE — 94761 N-INVAS EAR/PLS OXIMETRY MLT: CPT

## 2023-03-29 PROCEDURE — 97535 SELF CARE MNGMENT TRAINING: CPT

## 2023-03-29 PROCEDURE — 25000003 PHARM REV CODE 250: Performed by: NURSE PRACTITIONER

## 2023-03-29 PROCEDURE — 84100 ASSAY OF PHOSPHORUS: CPT | Performed by: NURSE PRACTITIONER

## 2023-03-29 PROCEDURE — 27000207 HC ISOLATION

## 2023-03-29 RX ADMIN — MEMANTINE 5 MG: 5 TABLET ORAL at 09:03

## 2023-03-29 RX ADMIN — MUPIROCIN: 20 OINTMENT TOPICAL at 08:03

## 2023-03-29 RX ADMIN — ASPIRIN 81 MG: 81 TABLET, COATED ORAL at 08:03

## 2023-03-29 RX ADMIN — MUPIROCIN: 20 OINTMENT TOPICAL at 09:03

## 2023-03-29 RX ADMIN — FERROUS SULFATE TAB EC 325 MG (65 MG FE EQUIVALENT) 1 EACH: 325 (65 FE) TABLET DELAYED RESPONSE at 08:03

## 2023-03-29 RX ADMIN — AMLODIPINE BESYLATE 5 MG: 5 TABLET ORAL at 08:03

## 2023-03-29 RX ADMIN — PANTOPRAZOLE SODIUM 40 MG: 40 TABLET, DELAYED RELEASE ORAL at 08:03

## 2023-03-29 RX ADMIN — Medication 9 MG: at 09:03

## 2023-03-29 RX ADMIN — METOPROLOL TARTRATE 50 MG: 50 TABLET, FILM COATED ORAL at 09:03

## 2023-03-29 RX ADMIN — METOPROLOL TARTRATE 50 MG: 50 TABLET, FILM COATED ORAL at 08:03

## 2023-03-29 RX ADMIN — INSULIN ASPART 1 UNITS: 100 INJECTION, SOLUTION INTRAVENOUS; SUBCUTANEOUS at 09:03

## 2023-03-29 RX ADMIN — HEPARIN SODIUM 5000 UNITS: 5000 INJECTION INTRAVENOUS; SUBCUTANEOUS at 09:03

## 2023-03-29 RX ADMIN — GUAIFENESIN AND DEXTROMETHORPHAN 10 ML: 100; 10 SYRUP ORAL at 05:03

## 2023-03-29 RX ADMIN — GUAIFENESIN AND DEXTROMETHORPHAN 10 ML: 100; 10 SYRUP ORAL at 06:03

## 2023-03-29 RX ADMIN — HEPARIN SODIUM 5000 UNITS: 5000 INJECTION INTRAVENOUS; SUBCUTANEOUS at 08:03

## 2023-03-29 RX ADMIN — GUAIFENESIN AND DEXTROMETHORPHAN 10 ML: 100; 10 SYRUP ORAL at 11:03

## 2023-03-29 RX ADMIN — MEMANTINE 5 MG: 5 TABLET ORAL at 08:03

## 2023-03-29 RX ADMIN — ATORVASTATIN CALCIUM 20 MG: 20 TABLET, FILM COATED ORAL at 09:03

## 2023-03-29 NOTE — PT/OT/SLP PROGRESS
Physical Therapy  Missed Treatment Note    Patient Name:  Michelle Saenz   MRN:  [id number]    Patient not seen today secondary to Other (Comment) (nurse going in to set pt up for breakfast). Will follow-up as scheduled.

## 2023-03-29 NOTE — PT/OT/SLP PROGRESS
Physical Therapy Treatment    Patient Name:  Michelle Saenz   MRN:  [id number]    Recommendations:     Discharge Recommendations:  nursing facility, skilled   Discharge Equipment Recommendations: none   Equipment to be obtained for discharge:  None. Pt has all DME  Barriers to discharge: Severity of deficits, Cognitive Status, and Level of Skilled Assistance Needed    Assessment:     Michelle Saenz is a 89 y.o. female admitted with a medical diagnosis of   1. Urinary tract infection without hematuria, site unspecified    2. COVID-19    3. Chronic kidney disease, unspecified CKD stage    4. NSTEMI (non-ST elevated myocardial infarction)    5. Moderate dementia associated with other underlying disease, without behavioral disturbance, psychotic disturbance, mood disturbance, or anxiety    6. Spondylosis of lumbar spine    7. Vitamin D deficiency    8. Type 2 diabetes mellitus with hyperglycemia, with long-term current use of insulin    9. Weakness    10. Fatigue, unspecified type    11. Acute cough    12. Pharyngitis, unspecified etiology    13. Elevated troponin    14. At risk for prolonged QT interval syndrome    Patient's niece present. Patient is agreeable but sleepy during session. She continues to be limited by knee pain.   Patient continues to demonstrate a need for therapy on a daily basis as patient continues to demonstrate decreased independence with functional mobility, bed mobility, transfers, and ambulation.     She presents with the following impairments/functional limitations:  weakness, impaired endurance, decreased ROM, impaired self care skills, impaired functional mobility, gait instability, impaired balance, pain, decreased lower extremity function .    Rehab Prognosis: Fair; patient would benefit from acute skilled PT services to address these deficits and reach maximum level of function.    Recent Surgery: * No surgery found *      Plan:     During this hospitalization, patient to be  seen  (3-5 x week) to address the identified rehab impairments via gait training, therapeutic activities, therapeutic exercises and progress toward the following goals:    Plan of Care Expires:  04/26/23    Subjective     Chief Complaint: pain in knee with standing / stepping  Patient/Family Comments/goals: none stated  Pain/Comfort:  Pain Rating 1: 10/10 (0/10 at rest; increased to 10/10 with WB and mobility)  Location - Side 1: Left  Location 1: knee  Pain Addressed 1: Reposition, Cessation of Activity  Pain Rating Post-Intervention 1: 0/10      Objective:     Communicated with nurse stringer prior to session.  Patient found HOB elevated with peripheral IV, PureWick, telemetry, pulse ox (continuous) upon PT entry to room. OT present; niece present. Bed alarm on    General Precautions: Standard, contact, fall, droplet   Orthopedic Precautions:N/A   Braces: N/A  Respiratory Status: Room air     Functional Mobility:  Bed Mobility:     Scooting: max A initially for positioning on EOB and then SBA for scooting along EOB   Supine to Sit: total assistance and of 2 persons  Sit to Supine: maximal assistance  Transfers:     Sit to Stand:  moderate assistance and of 2 persons with rolling walker  Gait: .Patient ambulated forward / backward 2ft f/b side steps right  with Rolling Walker and minimal assistance using swing to. Patient demonstrated decreased shannon, increased time in double stance, decreased step length, decreased stride length, and decreased weight-shifting ability during gait due to impaired balance, pain, impaired postural control, decreased ROM, and decreased strength.  Pt very limited by knee pain. Pt unable to lift R foot off the floor.          Therapeutic Activities and Exercises:   Static sitting balance initially required max A and BUE support however once supported she can sit SBA    Static standing balance; pt stands with min A for balance with BUE support on RW. Pt limited WB on LLE    Patient left  with bed in chair position with all lines intact, nurse notified, and niece present..nurse notified after session    GOALS:   Multidisciplinary Problems       Physical Therapy Goals          Problem: Physical Therapy    Goal Priority Disciplines Outcome Goal Variances Interventions   Physical Therapy Goal     PT, PT/OT Ongoing, Progressing     Description: Goals to be met by: discharge     Patient will increase functional independence with mobility by performin. Supine to sit with MInimal Assistance  2. Sit to supine with MInimal Assistance  3. Sit to stand transfer with Minimal Assistance  4. Bed to chair transfer with Minimal Assistance using Rolling Walker  5. Gait  x 50 feet with Minimal Assistance using Rolling Walker.                          Time Tracking:     PT Received On: 23  PT Start Time: 1021     PT Stop Time: 1048  PT Total Time (min): 27 min co tx time    Billable Minutes: Therapeutic Activity 13    Treatment Type: Treatment  PT/PTA: PT           2023

## 2023-03-29 NOTE — PLAN OF CARE
TRANSITION OF CARE PROGRESS NOTE    I have received checkout from Dr. Hemphill regarding this patient.     HO-I assessment:  Admission diagnosis: Weakness [R53.1]  NSTEMI (non-ST elevated myocardial infarction) [I21.4]  Vitamin D deficiency [E55.9]  Urinary tract infection without hematuria, site unspecified [N39.0]  Fatigue, unspecified type [R53.83]  Type 2 diabetes mellitus with hyperglycemia, with long-term current use of insulin [E11.65, Z79.4]  Spondylosis of lumbar spine [M47.816]  Chronic kidney disease, unspecified CKD stage [N18.9]  Moderate dementia associated with other underlying disease, without behavioral disturbance, psychotic disturbance, mood disturbance, or anxiety [F02.B0]  COVID-19 [U07.1]   Overnight management: Monitor VS and O2 saturation. Delirium precautions. Prioritize redirection, PRN Haloperidol 1mg q12hr  in place if repeated redirection fails. Airborne contact and droplet Isolation status. Continuous cardiac monitoring.   Code status: FULL    Please call Dr. Lucille Andino at [phone] from 03/29/2023 4PM to 03/30/2023 7AM if any issues arise.    Laina Eaton MD  [facility], HO-I

## 2023-03-29 NOTE — PROGRESS NOTES
Family Medicine  Progress Note     Attending Physician: Angel Hopkins MD  Hospital Length of Stay: 5 days  Subjective:      Brief HPI:  89 yo F with PMH dementia, HTN, HLD, MAGDY, DM III, CKD stage 3, bilateral knee OA complaint of dizziness on initial presentation. Patient admitted for COVID-19 infection, acute kidney injury, and NSTEMI (likely secondary to demand ischemia).     Interval History: Patient still has wax and wane in mentation. She did require haldol injection 1 mg overnight for delirium. Reports feeling comfortable in bed. Denies N/V/abdominal pain/fevers/or chills.    Objective:     Vital Signs (Most Recent):  Temp: 97.5 °F (36.4 °C) (03/29/23 0848)  Pulse: 64 (03/29/23 0848)  Resp: 18 (03/29/23 0848)  BP: (!) 164/73 (03/29/23 0848)  SpO2: 99 % (03/29/23 0848)   Vital Signs (24h Range):  Temp:  [97.5 °F (36.4 °C)-98.2 °F (36.8 °C)] 97.5 °F (36.4 °C)  Pulse:  [64-72] 64  Resp:  [18-21] 18  SpO2:  [95 %-99 %] 99 %  BP: (136-164)/(64-81) 164/73     Physical Examination:  Gen appearance: Not in acute distress, Satting 99 % on room air.   CV: RRR, 2/6 systolic ejection murmur heard throughout, no gallops or rubs  Resp: Clear breath sounds bilaterally  Abdomen: Soft, non-tender, non-distended  Extremities: No edema     Laboratory:  Most Recent Data:  Recent Lab Results  (Last 5 results in the past 24 hours)        03/29/23  0843   03/29/23  0623   03/29/23  0332   03/28/23  2127   03/28/23  1718        Albumin/Globulin Ratio     0.8           Albumin     2.7           Alkaline Phosphatase     82           ALT     13           AST     19           BILIRUBIN TOTAL     0.3           BUN     28.0           Calcium     9.4           Chloride     107           CO2     26           Creatinine     1.09           eGFR     47           Globulin, Total     3.3           Glucose     127           Phosphorus     2.9           POCT Glucose 87   101     243   188       Potassium     4.1           PROTEIN TOTAL     6.0            Sodium     140                                  Assessment & Plan:     COVID-19 pneumonitis, resolved  - Stable   - Continue airborne and contact isolation    UTI  - Completed Rocephin 3 days  - Resolved     ROXANNE on CKD   -Resolved  - Likely secondary to dehydration  - Baseline creatinine around 1.20     Hx of Diastolic dysfunction   - Repeat echocardiogram showed EF is 55 %, Grade 1 diastolic dysfunction    DM II  - Hold Lantus   - Sliding scale insulin   - CBG 4 times daily checks     HTN  HLD   - Continue Lopressor 50mg and  Amlodipine 5 mg    Hx of moderate dementia  -  Continue Namenda  - Delirium episodes. Haldol 1 mg PRN for delirium episodes     CODE STATUS: Full Code  Access: None  Antibiotics: None  Diet: Heart Healthy  DVT Prophylaxis: Heparin  GI Prophylaxis: Protonix 40 mg daily   Fluids: None    Disposition: Pending SNF placement

## 2023-03-29 NOTE — PT/OT/SLP PROGRESS
Occupational Therapy   Treatment    Name: Michelle Saenz  MRN: [id number]  Admitting Diagnosis:  COVID19, dementia      Recommendations:     Discharge Recommendations: nursing facility, skilled  Discharge Equipment Recommendations:  none  Barriers to discharge:  None    Assessment:     Michelle Saenz is a 89 y.o. female with a medical diagnosis of <principal problem not specified>.  She presents with functional decline, and nursing report of sundowning/agitated last night with niece in room throughout session and staying in room for supervision/safety.  Pt lethargic, falling asleep during session, but cooperative and pleasant throughout. Performance deficits affecting function are weakness, impaired endurance, impaired self care skills, impaired functional mobility, impaired balance, impaired cognition, decreased lower extremity function, decreased safety awareness, pain, decreased ROM, impaired fine motor, edema.     Rehab Prognosis:  Fair; patient would benefit from acute skilled OT services to address these deficits and reach maximum level of function.       Plan:     Patient to be seen  (2-5x/wk, M-F) to address the above listed problems via self-care/home management, therapeutic activities, therapeutic exercises  Plan of Care Expires:  (DC)  Plan of Care Reviewed with: patient, other (see comments) (niece)    Subjective     Chief Complaint: L knee pain with standing  Patient/Family Comments/goals: looking for placement d/t family acknowledging need for increased assist with pt care/safety  Pain/Comfort:  Pain Rating 1: 10/10  Location - Side 1: Left  Location 1: knee  Pain Addressed 1: Reposition, Cessation of Activity  Pain Rating Post-Intervention 1: 0/10    Objective:     Communicated with: nurse Woods prior to session.  Patient found HOB elevated with peripheral IV, PureWick, telemetry, pulse ox (continuous) upon OT entry to room.    General Precautions: Standard, contact, droplet, fall, airborne     Orthopedic Precautions:N/A  Braces: N/A  Respiratory Status: Room air   O2 sat 100%    Occupational Performance:     Bed Mobility:    Patient completed Scooting/Bridging with maximal assistance and initially max A but improved with cuing and balance training to be able to scoot sideways to HOB at end of session with SBA  Patient completed Supine to Sit with total assistance and 2 persons  Patient completed Sit to Supine with maximal assistance     Functional Mobility/Transfers:  Patient completed Sit <> Stand Transfer with moderate assistance and of 2 persons  with  rolling walker   Functional Mobility: min A sidestepping to HOB with RW    Activities of Daily Living:  Feeding:  stand by assistance to bring liquids to mouth using LUE, and to drink from straw  Grooming: stand by assistance seated EOB with setup assist and min verbal cues for brushing teeth and for wiping face with washcloth      Treatment & Education:  Pt asleep at intiation of tx and groggy upon wakening; OT oriented pt to place and situation, updated pt's niece in room on goals of facilitating appropriate sleep/awake cycle by attempting to keep pt alert during the day via conversation, playing games, bed in chair position, lights on, general stimulation; niece verbalized understanding.    Patient left with bed in chair position with all lines intact, call button in reach, and pt's niece present    GOALS:   Multidisciplinary Problems       Occupational Therapy Goals          Problem: Occupational Therapy    Goal Priority Disciplines Outcome Interventions   Occupational Therapy Goal     OT, PT/OT Ongoing, Progressing    Description: Patient will perform feeding with setup seated EOB or at recliner chair with good tolerance for sitting up OOB 2 hours.     Patient will perform grooming with setup assist while at sink at WC level.    Patient will perform toileting with min assist using BSC.    Patient will perform toilet transfer with CG assist with  use of RW.    Patient will perform upper body dressing with setup assist while seated EOB.                              Time Tracking:     OT Date of Treatment: 03/29/23  OT Start Time: 1021  OT Stop Time: 1048  OT Total Time (min): 27 min    Billable Minutes:Self Care/Home Management 14  Total Time 27    OT/MARY BETH: OT          3/29/2023

## 2023-03-30 PROBLEM — R79.89 ELEVATED TROPONIN: Status: ACTIVE | Noted: 2023-03-30

## 2023-03-30 PROBLEM — I21.4 NSTEMI (NON-ST ELEVATED MYOCARDIAL INFARCTION): Status: ACTIVE | Noted: 2023-03-30

## 2023-03-30 LAB
PHOSPHATE SERPL-MCNC: 3.5 MG/DL (ref 2.3–4.7)
POCT GLUCOSE: 239 MG/DL (ref 70–110)
POCT GLUCOSE: 251 MG/DL (ref 70–110)
POCT GLUCOSE: 257 MG/DL (ref 70–110)
POCT GLUCOSE: 310 MG/DL (ref 70–110)
POCT GLUCOSE: 331 MG/DL (ref 70–110)

## 2023-03-30 PROCEDURE — 27000207 HC ISOLATION

## 2023-03-30 PROCEDURE — 63600175 PHARM REV CODE 636 W HCPCS: Performed by: NURSE PRACTITIONER

## 2023-03-30 PROCEDURE — 97530 THERAPEUTIC ACTIVITIES: CPT

## 2023-03-30 PROCEDURE — 84100 ASSAY OF PHOSPHORUS: CPT | Performed by: NURSE PRACTITIONER

## 2023-03-30 PROCEDURE — 21400001 HC TELEMETRY ROOM

## 2023-03-30 PROCEDURE — 63600175 PHARM REV CODE 636 W HCPCS: Performed by: STUDENT IN AN ORGANIZED HEALTH CARE EDUCATION/TRAINING PROGRAM

## 2023-03-30 PROCEDURE — 97535 SELF CARE MNGMENT TRAINING: CPT

## 2023-03-30 PROCEDURE — 63600175 PHARM REV CODE 636 W HCPCS

## 2023-03-30 PROCEDURE — 25000003 PHARM REV CODE 250: Performed by: NURSE PRACTITIONER

## 2023-03-30 PROCEDURE — 25000003 PHARM REV CODE 250: Performed by: STUDENT IN AN ORGANIZED HEALTH CARE EDUCATION/TRAINING PROGRAM

## 2023-03-30 PROCEDURE — 94761 N-INVAS EAR/PLS OXIMETRY MLT: CPT

## 2023-03-30 RX ADMIN — HEPARIN SODIUM 5000 UNITS: 5000 INJECTION INTRAVENOUS; SUBCUTANEOUS at 09:03

## 2023-03-30 RX ADMIN — ASPIRIN 81 MG: 81 TABLET, COATED ORAL at 09:03

## 2023-03-30 RX ADMIN — INSULIN ASPART 3 UNITS: 100 INJECTION, SOLUTION INTRAVENOUS; SUBCUTANEOUS at 06:03

## 2023-03-30 RX ADMIN — INSULIN DETEMIR 10 UNITS: 100 INJECTION, SOLUTION SUBCUTANEOUS at 09:03

## 2023-03-30 RX ADMIN — INSULIN ASPART 2 UNITS: 100 INJECTION, SOLUTION INTRAVENOUS; SUBCUTANEOUS at 04:03

## 2023-03-30 RX ADMIN — AMLODIPINE BESYLATE 5 MG: 5 TABLET ORAL at 09:03

## 2023-03-30 RX ADMIN — MEMANTINE 5 MG: 5 TABLET ORAL at 09:03

## 2023-03-30 RX ADMIN — GUAIFENESIN AND DEXTROMETHORPHAN 10 ML: 100; 10 SYRUP ORAL at 11:03

## 2023-03-30 RX ADMIN — INSULIN ASPART 2 UNITS: 100 INJECTION, SOLUTION INTRAVENOUS; SUBCUTANEOUS at 09:03

## 2023-03-30 RX ADMIN — INSULIN ASPART 4 UNITS: 100 INJECTION, SOLUTION INTRAVENOUS; SUBCUTANEOUS at 11:03

## 2023-03-30 RX ADMIN — METOPROLOL TARTRATE 50 MG: 50 TABLET, FILM COATED ORAL at 09:03

## 2023-03-30 RX ADMIN — GUAIFENESIN AND DEXTROMETHORPHAN 10 ML: 100; 10 SYRUP ORAL at 05:03

## 2023-03-30 RX ADMIN — GUAIFENESIN AND DEXTROMETHORPHAN 10 ML: 100; 10 SYRUP ORAL at 06:03

## 2023-03-30 RX ADMIN — PANTOPRAZOLE SODIUM 40 MG: 40 TABLET, DELAYED RELEASE ORAL at 09:03

## 2023-03-30 RX ADMIN — ATORVASTATIN CALCIUM 20 MG: 20 TABLET, FILM COATED ORAL at 09:03

## 2023-03-30 NOTE — PT/OT/SLP PROGRESS
Physical Therapy Treatment    Patient Name:  Michelle Saenz   MRN:  [id number]    Recommendations:     Discharge Recommendations:  nursing facility, skilled   Discharge Equipment Recommendations: none   Equipment to be obtained for discharge:  None.  Barriers to discharge: Severity of deficits, Decreased caregiver support, Cognitive Status, Safety Awareness  , Level of Skilled Assistance Needed, and Endurance    Assessment:     Michelle Saenz is a 89 y.o. female admitted with a medical diagnosis of   1. Urinary tract infection without hematuria, site unspecified    2. COVID-19    3. Chronic kidney disease, unspecified CKD stage    4. NSTEMI (non-ST elevated myocardial infarction)    5. Moderate dementia associated with other underlying disease, without behavioral disturbance, psychotic disturbance, mood disturbance, or anxiety    6. Spondylosis of lumbar spine    7. Vitamin D deficiency    8. Type 2 diabetes mellitus with hyperglycemia, with long-term current use of insulin    9. Weakness    10. Fatigue, unspecified type    11. Acute cough    12. Pharyngitis, unspecified etiology    13. Elevated troponin    14. At risk for prolonged QT interval syndrome    Niece stepped out of room for session. Pt seen for co tx today. She is sleepy and requires some encouragement to participate. Pt is limited by knee pain and was unable to progress standing or stepping. PT discussed medication prior to activity. Pt reported she takes tylenol at home. Pt returned to bed and positioned into cardiac chair position. PT to progress as able     She presents with the following impairments/functional limitations:  weakness, impaired endurance, decreased ROM, impaired self care skills, impaired functional mobility, gait instability, impaired balance, pain, decreased lower extremity function .    Rehab Prognosis: Fair; patient would benefit from acute skilled PT services to address these deficits and reach maximum level of  function.    Recent Surgery: * No surgery found *      Plan:     During this hospitalization, patient to be seen  (3-5 x week) to address the identified rehab impairments via gait training, therapeutic activities, therapeutic exercises and progress toward the following goals:    Plan of Care Expires:  23    Subjective     Chief Complaint: pain  Patient/Family Comments/goals: none stated  Pain/Comfort:  Pain Rating 1: 10/10  Location - Side 1: Left  Location 1: knee  Pain Addressed 1: Reposition, Cessation of Activity, Nurse notified  Pain Rating Post-Intervention 1: 0/10      Objective:     Communicated with nurse stringer prior to session.  Patient found HOB elevated with peripheral IV, telemetry, PureWick upon PT entry to room.   Ot present  General Precautions: Standard, airborne, contact, droplet, fall   Orthopedic Precautions:N/A   Braces: N/A  Respiratory Status: Room air     Functional Mobility:  Bed Mobility:     Supine to Sit: maximal assistance  Sit to Supine: stand by assistance  Transfers:     Sit to Stand:  moderate assistance with rolling walker          Therapeutic Activities and Exercises:   Sitting static balance; close SBA   Standing static balance mod A 2* pain    Dynamic reaching activity; BUE reaching in all directions x 10 reps each. Pt reluctant to reach forward.    LE therex; B LE LAQ and ankle pumps x 10 reps; decreased Range L knee    Patient left with bed in chair position with all lines intact, call button in reach, and nurse notified..    GOALS:   Multidisciplinary Problems       Physical Therapy Goals          Problem: Physical Therapy    Goal Priority Disciplines Outcome Goal Variances Interventions   Physical Therapy Goal     PT, PT/OT Ongoing, Progressing     Description: Goals to be met by: discharge     Patient will increase functional independence with mobility by performin. Supine to sit with MInimal Assistance  2. Sit to supine with MInimal Assistance  3. Sit to stand  transfer with Minimal Assistance  4. Bed to chair transfer with Minimal Assistance using Rolling Walker  5. Gait  x 50 feet with Minimal Assistance using Rolling Walker.                          Time Tracking:     PT Received On: 03/30/23  PT Start Time: 1055     PT Stop Time: 1120  PT Total Time (min): 25 min co tx time    Billable Minutes: Therapeutic Activity 15    Treatment Type: Treatment  PT/PTA: PT           03/30/2023

## 2023-03-30 NOTE — PLAN OF CARE
TRANSITION OF CARE PROGRESS NOTE    I have received checkout from Dr. Hemphill regarding this patient.     HO 1 assessment:  Admission diagnosis: Weakness [R53.1]  NSTEMI (non-ST elevated myocardial infarction) [I21.4]  Vitamin D deficiency [E55.9]  Urinary tract infection without hematuria, site unspecified [N39.0]  Fatigue, unspecified type [R53.83]  Type 2 diabetes mellitus with hyperglycemia, with long-term current use of insulin [E11.65, Z79.4]  Spondylosis of lumbar spine [M47.816]  Chronic kidney disease, unspecified CKD stage [N18.9]  Moderate dementia associated with other underlying disease, without behavioral disturbance, psychotic disturbance, mood disturbance, or anxiety [F02.B0]  COVID-19 [U07.1]   Overnight management: Monitor VS and O2 saturation. Delirium precautions in place. Prioritize redirection if possible, PRN Haloperidol 1mg q12hr in place if repeated redirection fails. Airborne contact and droplet Isolation status. Continuous cardiac monitoring.   Code status: Full    Please call Dr. Radha Daugherty at [phone] from 03/30/2023 4PM to 03/31/2023 7AM if any issues arise.    Nathanael Hair MD    [facility] Family [facility] Resident HO-1

## 2023-03-30 NOTE — PT/OT/SLP PROGRESS
Occupational Therapy   Treatment    Name: Michelle Saenz  MRN: [id number]  Admitting Diagnosis:  NSTEMI, COVID 19, lumbar spondylosis, CKD, dementia    Recommendations:     Discharge Recommendations: nursing facility, skilled  Discharge Equipment Recommendations:  none  Barriers to discharge:  None    Assessment:     Michelle Saenz is a 89 y.o. female with a medical diagnosis of <principal problem not specified>.  She presents with functional decline, lethargy but cooperative, limited with standing by c/o L knee pain with weightbearing. Performance deficits affecting function are weakness, impaired endurance, impaired self care skills, impaired functional mobility, impaired balance, impaired cognition, decreased lower extremity function, decreased safety awareness, pain, decreased ROM, impaired fine motor, edema.     Rehab Prognosis:  Fair; patient would benefit from acute skilled OT services to address these deficits and reach maximum level of function.       Plan:     Patient to be seen  (2-5x/wk, M-F) to address the above listed problems via self-care/home management, therapeutic activities, therapeutic exercises  Plan of Care Expires:  (DC)  Plan of Care Reviewed with: patient, other (see comments) (niece)    Subjective     Chief Complaint: L knee pain with standing, general fatigue  Patient/Family Comments/goals: DC to SNF when medically appopriate  Pain/Comfort:  Pain Rating 1: 10/10  Location - Side 1: Left  Location 1: knee  Pain Addressed 1: Reposition, Cessation of Activity, Nurse notified  Pain Rating Post-Intervention 1: 0/10    Objective:     Communicated with: nurse Woods prior to session.  Patient found HOB elevated with peripheral IV, PureWick, telemetry upon OT entry to room.    General Precautions: Standard, airborne, contact, droplet, fall    Orthopedic Precautions:N/A  Braces: N/A  Respiratory Status: Room air     Occupational Performance:     Bed Mobility:    Patient completed  Scooting/Bridging with stand by assistance  Patient completed Supine to Sit with maximal assistance  Patient completed Sit to Supine with stand by assistance     Functional Mobility/Transfers:  Patient completed Sit <> Stand Transfer with moderate assistance  with  rolling walker   Functional Mobility: unable to tolerate    Activities of Daily Living:  Grooming: stand by assistance seated EOB to brush teeth and apply lotion to face and arms        Treatment & Education:  Pt required gentle encouragement to participate intially d/t c/o fatigue and wanting to take a nap.  Pt did cooperate throughout session despite noted fatigue and occasionally dozing off during session, but able to perform ADLs tasks with good sitting balance EOB, and to perform reaching activities to chest height.  Pt. educated on OT goals, POC, orientation to environment, use of call bell for assist with transfers OOB or for any other needs due to fall risk.     Patient left with bed in chair position with all lines intact, call button in reach, and nurse notified; pt's niece outside room at nurses' station but returning to room momentarily.    GOALS:   Multidisciplinary Problems       Occupational Therapy Goals          Problem: Occupational Therapy    Goal Priority Disciplines Outcome Interventions   Occupational Therapy Goal     OT, PT/OT Ongoing, Progressing    Description: Patient will perform feeding with setup seated EOB or at recliner chair with good tolerance for sitting up OOB 2 hours.     Patient will perform grooming with setup assist while at sink at WC level.    Patient will perform toileting with min assist using BSC.    Patient will perform toilet transfer with CG assist with use of RW.    Patient will perform upper body dressing with setup assist while seated EOB.                              Time Tracking:     OT Date of Treatment: 03/30/23  OT Start Time: 1055  OT Stop Time: 1120  OT Total Time (min): 25 min    Billable  Minutes:Self Care/Home Management 10  Total Time 25, with 1 unit of assist to PT    OT/MARY BETH: OT          3/30/2023

## 2023-03-30 NOTE — PLAN OF CARE
Problem: Adult Inpatient Plan of Care  Goal: Plan of Care Review  Outcome: Ongoing, Progressing  Goal: Patient-Specific Goal (Individualized)  Outcome: Ongoing, Progressing  Goal: Absence of Hospital-Acquired Illness or Injury  Outcome: Ongoing, Progressing  Goal: Optimal Comfort and Wellbeing  Outcome: Ongoing, Progressing  Goal: Readiness for Transition of Care  Outcome: Ongoing, Progressing     Problem: Diabetes Comorbidity  Goal: Blood Glucose Level Within Targeted Range  Outcome: Ongoing, Progressing     Problem: Skin Injury Risk Increased  Goal: Skin Health and Integrity  Outcome: Ongoing, Progressing     Problem: Infection  Goal: Absence of Infection Signs and Symptoms  Outcome: Ongoing, Progressing

## 2023-03-30 NOTE — PROGRESS NOTES
Family Medicine  Progress Note     Attending Physician: Angel Hopkins MD  Hospital Length of Stay: 6 days  Subjective:      Brief HPI:  89 yo F with PMH dementia, HTN, HLD, MAGDY, DM III, CKD stage 3, bilateral knee OA complaint of dizziness on initial presentation. Patient admitted for COVID-19 infection, acute kidney injury, and NSTEMI (likely secondary to demand ischemia).     Interval History: Pt is stable overnight. No acute events. Reports eating, drinking, passing bowel movements and urination. Denies fevers, chills, N/V, and or abdominal pain.   Objective:     Vital Signs (Most Recent):  Temp: 98.1 °F (36.7 °C) (03/30/23 1125)  Pulse: 70 (03/30/23 1125)  Resp: 18 (03/30/23 1125)  BP: (!) 157/75 (03/30/23 1125)  SpO2: 99 % (03/30/23 1125)   Vital Signs (24h Range):  Temp:  [97.9 °F (36.6 °C)-98.6 °F (37 °C)] 98.1 °F (36.7 °C)  Pulse:  [63-70] 70  Resp:  [18-20] 18  SpO2:  [97 %-99 %] 99 %  BP: (148-160)/(64-81) 157/75     Physical Examination:  Gen appearance: Not in acute distress, Satting 99 % on room air.   CV: RRR, 2/6 systolic ejection murmur heard throughout, no gallops or rubs  Resp: Clear breath sounds bilaterally  Abdomen: Soft, non-tender, non-distended  Extremities: No edema     Laboratory:  Most Recent Data:  Recent Lab Results  (Last 5 results in the past 24 hours)        03/30/23  1122   03/30/23  0926   03/30/23  0611   03/30/23  0354   03/29/23  2123        Phosphorus       3.5         POCT Glucose 331   251   257     270                              Assessment & Plan:   COVID-19 pneumonitis- stable  UTI- resolved  ROXANNE on CKD- resolved  Hx of Diastolic dysfunction  DM II  HTN  HLD  NSTEMI, type 2 -resolved   Moderate dementia    - Airborne and contact isolation to be continued for 10 days (from 03/24)  - Case management consulted for SNF placement, pending placement  - COVID-19 infection but stable on room air and asymptomatic  - Resumed Detemir 10 units BID (home regimen is 20 and 30 units am  and pm), put on SSI. Refrain from adding more than necessary insulin due to inpatient status.   - Continue Lopressor, Amlodipine, and Namenda    CODE STATUS: Full Code  Access: None  Antibiotics: completed Rocephin  Diet: Heart Healthy  DVT Prophylaxis: Heparin  GI Prophylaxis: Protonix  Fluids: None     Disposition: Admit to Tele initially for ROXANNE, NSTEMI demand ischemia. Pending SNF placement.

## 2023-03-31 LAB
POCT GLUCOSE: 184 MG/DL (ref 70–110)
POCT GLUCOSE: 248 MG/DL (ref 70–110)
POCT GLUCOSE: 348 MG/DL (ref 70–110)
POCT GLUCOSE: 358 MG/DL (ref 70–110)

## 2023-03-31 PROCEDURE — 25000003 PHARM REV CODE 250: Performed by: STUDENT IN AN ORGANIZED HEALTH CARE EDUCATION/TRAINING PROGRAM

## 2023-03-31 PROCEDURE — 94761 N-INVAS EAR/PLS OXIMETRY MLT: CPT

## 2023-03-31 PROCEDURE — 21400001 HC TELEMETRY ROOM

## 2023-03-31 PROCEDURE — 63600175 PHARM REV CODE 636 W HCPCS

## 2023-03-31 PROCEDURE — 25000003 PHARM REV CODE 250: Performed by: NURSE PRACTITIONER

## 2023-03-31 PROCEDURE — 63600175 PHARM REV CODE 636 W HCPCS: Performed by: STUDENT IN AN ORGANIZED HEALTH CARE EDUCATION/TRAINING PROGRAM

## 2023-03-31 PROCEDURE — 63600175 PHARM REV CODE 636 W HCPCS: Performed by: NURSE PRACTITIONER

## 2023-03-31 PROCEDURE — 27000207 HC ISOLATION

## 2023-03-31 PROCEDURE — 97530 THERAPEUTIC ACTIVITIES: CPT

## 2023-03-31 RX ADMIN — GUAIFENESIN AND DEXTROMETHORPHAN 10 ML: 100; 10 SYRUP ORAL at 12:03

## 2023-03-31 RX ADMIN — MEMANTINE 5 MG: 5 TABLET ORAL at 08:03

## 2023-03-31 RX ADMIN — AMLODIPINE BESYLATE 5 MG: 5 TABLET ORAL at 08:03

## 2023-03-31 RX ADMIN — Medication 9 MG: at 08:03

## 2023-03-31 RX ADMIN — ACETAMINOPHEN 650 MG: 325 TABLET ORAL at 08:03

## 2023-03-31 RX ADMIN — INSULIN DETEMIR 15 UNITS: 100 INJECTION, SOLUTION SUBCUTANEOUS at 08:03

## 2023-03-31 RX ADMIN — FERROUS SULFATE TAB EC 325 MG (65 MG FE EQUIVALENT) 1 EACH: 325 (65 FE) TABLET DELAYED RESPONSE at 08:03

## 2023-03-31 RX ADMIN — ATORVASTATIN CALCIUM 20 MG: 20 TABLET, FILM COATED ORAL at 08:03

## 2023-03-31 RX ADMIN — HEPARIN SODIUM 5000 UNITS: 5000 INJECTION INTRAVENOUS; SUBCUTANEOUS at 08:03

## 2023-03-31 RX ADMIN — GUAIFENESIN AND DEXTROMETHORPHAN 10 ML: 100; 10 SYRUP ORAL at 11:03

## 2023-03-31 RX ADMIN — METOPROLOL TARTRATE 50 MG: 50 TABLET, FILM COATED ORAL at 08:03

## 2023-03-31 RX ADMIN — GUAIFENESIN AND DEXTROMETHORPHAN 10 ML: 100; 10 SYRUP ORAL at 05:03

## 2023-03-31 RX ADMIN — INSULIN ASPART 2 UNITS: 100 INJECTION, SOLUTION INTRAVENOUS; SUBCUTANEOUS at 05:03

## 2023-03-31 RX ADMIN — INSULIN DETEMIR 10 UNITS: 100 INJECTION, SOLUTION SUBCUTANEOUS at 08:03

## 2023-03-31 RX ADMIN — ASPIRIN 81 MG: 81 TABLET, COATED ORAL at 08:03

## 2023-03-31 RX ADMIN — GUAIFENESIN AND DEXTROMETHORPHAN 10 ML: 100; 10 SYRUP ORAL at 06:03

## 2023-03-31 RX ADMIN — PANTOPRAZOLE SODIUM 40 MG: 40 TABLET, DELAYED RELEASE ORAL at 08:03

## 2023-03-31 RX ADMIN — INSULIN ASPART 5 UNITS: 100 INJECTION, SOLUTION INTRAVENOUS; SUBCUTANEOUS at 12:03

## 2023-03-31 RX ADMIN — INSULIN ASPART 2 UNITS: 100 INJECTION, SOLUTION INTRAVENOUS; SUBCUTANEOUS at 08:03

## 2023-03-31 NOTE — PLAN OF CARE
TRANSITION OF CARE PROGRESS NOTE    I have received checkout from Dr. Hemphill regarding this patient.     HO-I assessment:  Admission diagnosis: Weakness [R53.1]  NSTEMI (non-ST elevated myocardial infarction) [I21.4]  Vitamin D deficiency [E55.9]  Urinary tract infection without hematuria, site unspecified [N39.0]  Fatigue, unspecified type [R53.83]  Type 2 diabetes mellitus with hyperglycemia, with long-term current use of insulin [E11.65, Z79.4]  Spondylosis of lumbar spine [M47.816]  Chronic kidney disease, unspecified CKD stage [N18.9]  Moderate dementia associated with other underlying disease, without behavioral disturbance, psychotic disturbance, mood disturbance, or anxiety [F02.B0]  COVID-19 [U07.1]   Overnight management: Monitor VS and O2 saturation. Delirium precautions. Niece at bedside. Prioritize redirection, PRN Haloperidol 1mg q12hr  in place if repeated redirection fails. Airborne contact and droplet Isolation status. Continuous cardiac monitoring.   Code status: FULL    Please call Dr. Maribeth Zarate at [phone] from 03/31/2023 4PM to 04/01/2023 7AM if any issues arise.    Laina Eaton MD  [facility], HO-I

## 2023-03-31 NOTE — PROGRESS NOTES
Family Medicine  Progress Note     Attending Physician: Angel Hopkins MD  Hospital Length of Stay: 7 days  Subjective:   Interval History: Patient reports feeling comfortable and well overall. Her niece has been staying with her during night time so she has been feeling more at ease and less anxious. She has not been requiring Haldol since niece has been present during night time. Pt reports good appetite, no problems with bowel movements and or urination. Denies fevers, chills, and or generalized pain.     Objective:     Vital Signs (Most Recent):  Temp: 98.1 °F (36.7 °C) (03/31/23 0740)  Pulse: 64 (03/31/23 0740)  Resp: 18 (03/31/23 0430)  BP: (!) 169/75 (03/31/23 0740)  SpO2: 99 % (03/31/23 0740)   Vital Signs (24h Range):  Temp:  [98.1 °F (36.7 °C)-98.6 °F (37 °C)] 98.1 °F (36.7 °C)  Pulse:  [62-70] 64  Resp:  [18] 18  SpO2:  [97 %-100 %] 99 %  BP: (144-169)/(73-77) 169/75     Physical Examination:  Gen appearance: Not in acute distress, Satting  % on room air.   CV: RRR, 2/6 systolic ejection murmur heard throughout, no gallops or rubs  Resp: Clear breath sounds bilaterally  Abdomen: Soft, non-tender, non-distended  Extremities: No edema     Laboratory:  Most Recent Data:  Recent Lab Results         03/31/23  0517   03/30/23  1956   03/30/23  1629   03/30/23  1122        POCT Glucose 184   310   239   331               Assessment & Plan:     COVID-19- stable  UTI- resolved  ROXANNE on CKD- resolved  Hx of Diastolic dysfunction  DM II, insulin dependent  HTN  HLD  NSTEMI, type 2 -resolved   Moderate dementia    - Continue Airborne and contact isolation for 10 days (from dx date 03/24)  - CM consulted for SNF placement , pending final decision  - Continue PT/OT  - Will up-titrate Detemir to 15 units BID (home regimen is lantus 20 and 30 units AM and PM), put on SSI. Refrain from adding more than necessary insulin due to pt's inpatient status.  - Continue Lopressor, Amlodipine, and Namenda     CODE STATUS:  Full Code  Access: None  Antibiotics: Completed Rocephin  Diet: Heart healthy  DVT Prophylaxis: Heparin  GI Prophylaxis: Protonix  Fluids:  None    Disposition: Pending SNF placement

## 2023-03-31 NOTE — PT/OT/SLP PROGRESS
Physical Therapy Treatment    Patient Name:  Michelle Saenz   MRN:  [id number]    Recommendations:     Discharge Recommendations:  nursing facility, skilled   Discharge Equipment Recommendations: none   Equipment to be obtained for discharge:  None.  Barriers to discharge: Severity of deficits, Decreased caregiver support, Cognitive Status, Safety Awareness pain, Level of Skilled Assistance Needed, Endurance, and      Assessment:     Michelle Saenz is a 89 y.o. female admitted with a medical diagnosis of   1. Urinary tract infection without hematuria, site unspecified    2. COVID-19    3. Chronic kidney disease, unspecified CKD stage    4. NSTEMI (non-ST elevated myocardial infarction)    5. Moderate dementia associated with other underlying disease, without behavioral disturbance, psychotic disturbance, mood disturbance, or anxiety    6. Spondylosis of lumbar spine    7. Vitamin D deficiency    8. Type 2 diabetes mellitus with hyperglycemia, with long-term current use of insulin    9. Weakness    10. Fatigue, unspecified type    11. Acute cough    12. Pharyngitis, unspecified etiology    13. Elevated troponin    14. At risk for prolonged QT interval syndrome    Patient is more alert and talkative today. She remains limited by knee pain and is unable to walk and only able to stand x 1 attempt.     She presents with the following impairments/functional limitations:  weakness, impaired endurance, decreased ROM, impaired self care skills, impaired functional mobility, gait instability, impaired balance, pain, decreased lower extremity function .    Rehab Prognosis: Fair; patient would benefit from acute skilled PT services to address these deficits and reach maximum level of function.    Recent Surgery: * No surgery found *      Plan:     During this hospitalization, patient to be seen  (3-5 x week) to address the identified rehab impairments via gait training, therapeutic activities, therapeutic exercises  and progress toward the following goals:    Plan of Care Expires:  23    Subjective     Chief Complaint: pain in knee  Patient/Family Comments/goals: none stated  Pain/Comfort:  Pain Rating 1: 5/10  Location - Side 1: Left  Location 1: knee  Pain Addressed 1: Reposition, Nurse notified  Pain Rating Post-Intervention 1: 510      Objective:     Communicated with nurse patrick prior to session.  Patient found HOB elevated with peripheral IV, telemetry, PureWick upon PT entry to room.     General Precautions: Standard, airborne, contact, droplet, fall   Orthopedic Precautions:N/A   Braces: N/A  Respiratory Status: Room air     Functional Mobility:  Bed Mobility:     Supine to Sit: minimum assistance  Sit to Supine: minimum assistance  Transfers:     Sit to Stand:  moderate assistance with rolling walker  Gait: patient is only able to move towards HOB ~ 1-2 ft. Pt unable to lift R foot from floor due to L knee pain. Pt flexed and unable to participate further          Therapeutic Activities and Exercises:   Seated dynamic balance; BUE reaching x 10 reps with each UE    BLE LAQ and ankle pumps x 10 reps. Minimal range and AA for knee extension    Patient left with bed in chair position with all lines intact, call button in reach, bed alarm on, and nurse notified..    GOALS:   Multidisciplinary Problems       Physical Therapy Goals          Problem: Physical Therapy    Goal Priority Disciplines Outcome Goal Variances Interventions   Physical Therapy Goal     PT, PT/OT Ongoing, Progressing     Description: Goals to be met by: discharge     Patient will increase functional independence with mobility by performin. Supine to sit with MInimal Assistance  2. Sit to supine with MInimal Assistance  3. Sit to stand transfer with Minimal Assistance  4. Bed to chair transfer with Minimal Assistance using Rolling Walker  5. Gait  x 50 feet with Minimal Assistance using Rolling Walker.                          Time  Tracking:     PT Received On: 03/31/23  PT Start Time: 1046     PT Stop Time: 1109  PT Total Time (min): 23 min     Billable Minutes: Therapeutic Activity 23    Treatment Type: Treatment  PT/PTA: PT           03/31/2023

## 2023-03-31 NOTE — PROGRESS NOTES
Inpatient Nutrition Assessment    Admit Date: 3/24/2023   Total duration of encounter: 7 days     Nutrition Recommendation/Prescription     cardiac/no concentrated sweets (hx DM)  Boost Glucose Control (provides 250 kcal, 14 g protein per serving)   3. MVI/fe  4. Biweekly wt  5. If appetite remains --decreased--consider megace  Will monitor nutrition status   Communication of Recommendations: reviewed with nurse    Nutrition Assessment     Malnutrition Assessment/Nutrition-Focused Physical Exam    Malnutrition in the context of acute illness or injury  Degree of Malnutrition: severe malnutrition  Energy Intake: </= 50% of estimated energy requirement for >/= 5 days  Interpretation of Weight Loss: >5% in 1 month  Body Fat:does not meet criteria  Area of Body Fat Loss: does not meet criteria  Muscle Mass Loss: does not meet criteria  Area of Muscle Mass Loss: does not meet criteria  Fluid Accumulation: unable to obtain  Edema: unable to obtain   Reduced  Strength: unable to obtain  A minimum of two characteristics is recommended for diagnosis of either severe or non-severe malnutrition.    Chart Review    Reason Seen: continuous nutrition monitoring and follow-up    Malnutrition Screening Tool Results   Have you recently lost weight without trying?: No  Have you been eating poorly because of a decreased appetite?: No   MST Score: 0     Diagnosis:  NSTEMI, COVID +, UTI, CKD, CHF, MAGDY, DM, HTN, HLD, dementia    Relevant Medical History: dementia, HTN, HLD, MAGDY, DM, CKD    Nutrition-Related Medications: ASA, atorvastatin, rocephin, FeSO4, insulin pantoprazole ; lopressor  Calorie Containing IV Medications: no significant kcals from medications at this time    Nutrition-Related Labs:  (3-27) H?H 12.2/39.3 Gluc 307(H) Bun 15.8 Cr 1.0 GFR 49   3/29 Gluc 127 Bun 28 Cr 1.0     Diet/PN Order: Diet heart healthy  Oral Supplement Order: Boost Glucose Control  Tube Feeding Order: none  Appetite/Oral Intake: poor/25-50% of  "meals; pt does drink boost   Factors Affecting Nutritional Intake: decreased appetite  Food/Adventist/Cultural Preferences: none reported  Food Allergies: none reported    Skin Integrity: intact  Wound(s):   none reported     Comments  (3/31) Pt sleeping during rounds; family member is at bedside--reported pt eats small amount at meal time --25-50% meals but does drink boost supplement. Encouraged supplement use. Awaiting SNF placement.  Labs acknowledged.     (3/27) Pt with decreased po intake approx week; ate 25% breakfast/drank milk ; will order boost shake for added nutrition. Pt unsure UBW--current wt 62.1kg; wt 2 weeks ago 74kg--? 17% wt loss; will track wts. Encourage oral intake; consider megace if appetite remains decreased. Gluc (H)--hx DM--will add no concentrated sweets.     Anthropometrics    Height: 5' 5" (165.1 cm)    Last Weight: 62.1 kg (137 lb) (23 0747) Weight Method: Bed Scale  BMI (Calculated): 22.8  BMI Classification: normal (BMI 18.5-24.9)     Ideal Body Weight (IBW), Female: 125 lb     % Ideal Body Weight, Female (lb): 109.6 %                    Usual Body Weight (UBW), kg:  (pt unsure UBW--EMR --prior wts 160-165#; will track)        Usual Weight Provided By: EMR weight history    Wt Readings from Last 5 Encounters:   23 62.1 kg (137 lb)   23 74.8 kg (165 lb)   23 75.5 kg (166 lb 7.2 oz)   23 76 kg (167 lb 8.8 oz)   22 76.9 kg (169 lb 8.5 oz)     Weight Change(s) Since Admission:  Admit Weight: 94.3 kg (208 lb) (23 1605)  3/27--today wt 62.1kg ; will track wts--prior wt approx 74kg.     Estimated Needs    Weight Used For Calorie Calculations: 62.1 kg (136 lb 14.5 oz)  Energy Calorie Requirements (kcal): 1552 kcal/d; 25 zulema/kg  Energy Need Method: Kcal/kg  Weight Used For Protein Calculations: 62.1 kg (136 lb 14.5 oz)  Protein Requirements: 80 gm protein/d; 1.3 gm/kg  Fluid Requirements (mL): 1552 ml/d; 1ml/zulema  Temp (24hrs), Av.3 °F (36.8 °C), " Min:97.9 °F (36.6 °C), Max:98.6 °F (37 °C)         Enteral Nutrition    Patient not receiving enteral nutrition at this time.    Parenteral Nutrition    Patient not receiving parenteral nutrition support at this time.    Evaluation of Received Nutrient Intake    Calories: not meeting estimated needs  Protein: not meeting estimated needs    Patient Education    Not applicable.    Nutrition Diagnosis     PES: Malnutrition related to acute illness as evidenced by poor appetite approx week;  Wt loss--.> 5% month (continues)    Interventions/Goals     Intervention(s): modified composition of meals/snacks, commercial beverage, multivitamin/mineral supplement therapy, and collaboration with other providers  Goal: Meet greater than 75% of nutritional needs by follow-up. (goal progressing)    Monitoring & Evaluation     Dietitian will monitor food and beverage intake, weight, and glucose/endocrine profile.  Nutrition Risk/Follow-Up: high (follow-up in 1-4 days)   Please consult if re-assessment needed sooner.

## 2023-03-31 NOTE — PLAN OF CARE
03/31/23 1235   Discharge Reassessment   Assessment Type Discharge Planning Reassessment   Did the patient's condition or plan change since previous assessment? No   Post-Acute Status   Post-Acute Authorization Placement   Post-Acute Placement Status Pending payor review/awaiting authorization (if required)     Updates submitted to [facility] via [employer]. Request for insurance auth today for SNF placement.

## 2023-04-01 LAB
POCT GLUCOSE: 232 MG/DL (ref 70–110)
POCT GLUCOSE: 257 MG/DL (ref 70–110)
POCT GLUCOSE: 259 MG/DL (ref 70–110)
POCT GLUCOSE: 267 MG/DL (ref 70–110)

## 2023-04-01 PROCEDURE — 21400001 HC TELEMETRY ROOM

## 2023-04-01 PROCEDURE — 25000003 PHARM REV CODE 250: Performed by: STUDENT IN AN ORGANIZED HEALTH CARE EDUCATION/TRAINING PROGRAM

## 2023-04-01 PROCEDURE — 27000207 HC ISOLATION

## 2023-04-01 PROCEDURE — 94761 N-INVAS EAR/PLS OXIMETRY MLT: CPT

## 2023-04-01 PROCEDURE — 25000003 PHARM REV CODE 250: Performed by: NURSE PRACTITIONER

## 2023-04-01 PROCEDURE — 63600175 PHARM REV CODE 636 W HCPCS: Performed by: NURSE PRACTITIONER

## 2023-04-01 PROCEDURE — 63600175 PHARM REV CODE 636 W HCPCS

## 2023-04-01 PROCEDURE — 63600175 PHARM REV CODE 636 W HCPCS: Performed by: STUDENT IN AN ORGANIZED HEALTH CARE EDUCATION/TRAINING PROGRAM

## 2023-04-01 RX ORDER — AMLODIPINE BESYLATE 10 MG/1
10 TABLET ORAL DAILY
Status: DISCONTINUED | OUTPATIENT
Start: 2023-04-02 | End: 2023-04-02

## 2023-04-01 RX ORDER — AMLODIPINE BESYLATE 5 MG/1
5 TABLET ORAL ONCE
Status: COMPLETED | OUTPATIENT
Start: 2023-04-01 | End: 2023-04-01

## 2023-04-01 RX ADMIN — GUAIFENESIN AND DEXTROMETHORPHAN 10 ML: 100; 10 SYRUP ORAL at 05:04

## 2023-04-01 RX ADMIN — ATORVASTATIN CALCIUM 20 MG: 20 TABLET, FILM COATED ORAL at 08:04

## 2023-04-01 RX ADMIN — ASPIRIN 81 MG: 81 TABLET, COATED ORAL at 09:04

## 2023-04-01 RX ADMIN — ACETAMINOPHEN 650 MG: 325 TABLET ORAL at 09:04

## 2023-04-01 RX ADMIN — METOPROLOL TARTRATE 50 MG: 50 TABLET, FILM COATED ORAL at 08:04

## 2023-04-01 RX ADMIN — INSULIN ASPART 1 UNITS: 100 INJECTION, SOLUTION INTRAVENOUS; SUBCUTANEOUS at 08:04

## 2023-04-01 RX ADMIN — GUAIFENESIN AND DEXTROMETHORPHAN 10 ML: 100; 10 SYRUP ORAL at 11:04

## 2023-04-01 RX ADMIN — HEPARIN SODIUM 5000 UNITS: 5000 INJECTION INTRAVENOUS; SUBCUTANEOUS at 09:04

## 2023-04-01 RX ADMIN — AMLODIPINE BESYLATE 5 MG: 5 TABLET ORAL at 09:04

## 2023-04-01 RX ADMIN — INSULIN DETEMIR 17 UNITS: 100 INJECTION, SOLUTION SUBCUTANEOUS at 08:04

## 2023-04-01 RX ADMIN — AMLODIPINE BESYLATE 5 MG: 5 TABLET ORAL at 06:04

## 2023-04-01 RX ADMIN — INSULIN ASPART 2 UNITS: 100 INJECTION, SOLUTION INTRAVENOUS; SUBCUTANEOUS at 06:04

## 2023-04-01 RX ADMIN — INSULIN ASPART 3 UNITS: 100 INJECTION, SOLUTION INTRAVENOUS; SUBCUTANEOUS at 01:04

## 2023-04-01 RX ADMIN — PANTOPRAZOLE SODIUM 40 MG: 40 TABLET, DELAYED RELEASE ORAL at 09:04

## 2023-04-01 RX ADMIN — GUAIFENESIN AND DEXTROMETHORPHAN 10 ML: 100; 10 SYRUP ORAL at 06:04

## 2023-04-01 RX ADMIN — INSULIN DETEMIR 17 UNITS: 100 INJECTION, SOLUTION SUBCUTANEOUS at 09:04

## 2023-04-01 RX ADMIN — MEMANTINE 5 MG: 5 TABLET ORAL at 08:04

## 2023-04-01 RX ADMIN — HEPARIN SODIUM 5000 UNITS: 5000 INJECTION INTRAVENOUS; SUBCUTANEOUS at 08:04

## 2023-04-01 RX ADMIN — INSULIN ASPART 3 UNITS: 100 INJECTION, SOLUTION INTRAVENOUS; SUBCUTANEOUS at 05:04

## 2023-04-01 RX ADMIN — GUAIFENESIN AND DEXTROMETHORPHAN 10 ML: 100; 10 SYRUP ORAL at 01:04

## 2023-04-01 RX ADMIN — MEMANTINE 5 MG: 5 TABLET ORAL at 09:04

## 2023-04-01 RX ADMIN — METOPROLOL TARTRATE 50 MG: 50 TABLET, FILM COATED ORAL at 09:04

## 2023-04-01 NOTE — PROGRESS NOTES
"[facility] Family Medicine Progress Note     Michelle Saenz  [id number]  04/01/2023     Chief Complaint   Patient presents with    Dizziness     Dizziness x 1 day       Brief HPI: 89 yo F with PMH dementia, HTN, HLD, MAGDY, DM III, CKD stage 3, bilateral knee OA complaint of dizziness on initial presentation. Patient admitted for COVID-19 infection, acute kidney injury, and NSTEMI (likely secondary to demand ischemia).     Interval History: NAEON. BP was documented to be consistently elevated in 160s/70s overnight, VSS otherwise. CBGs overnight ranged from 184-348, fasting CBG of 232 this morning. Patient did not require PRN Haladol as niece at bedside for redirection. Did not receive PRN hydralazine overnight. Satting well on room air. Patient has no complaints this AM, denies chest pain, worsening shortness of breath, trouble breathing, HA, syncope, fever, chills, nausea, vomiting.     Blood pressure (!) 153/72, pulse 66, temperature 97.7 °F (36.5 °C), temperature source Oral, resp. rate 20, height 5' 5" (1.651 m), weight 62.1 kg (137 lb), SpO2 100 %    Physical Exam  Constitutional:       General: She is not in acute distress.  Cardiovascular:      Rate and Rhythm: Regular rhythm.   Pulmonary:      Effort: Pulmonary effort is normal. No accessory muscle usage or respiratory distress.      Comments: + cough  SpO2>95%  Chest:      Chest wall: No tenderness.   Musculoskeletal:      Right lower leg: No edema.      Left lower leg: No edema.   Skin:     Capillary Refill: Capillary refill takes less than 2 seconds.   Neurological:      Mental Status: She is alert and oriented to person, place, and time.         Labs  Lab Results   Component Value Date    WBC 6.8 03/27/2023    HGB 12.2 03/27/2023    HCT 39.3 03/27/2023    MCV 90.1 03/27/2023     03/27/2023         Lab Results   Component Value Date     03/29/2023    K 4.1 03/29/2023    CO2 26 03/29/2023    BUN 28.0 (H) 03/29/2023    CREATININE 1.09 (H) " 03/29/2023    CALCIUM 9.4 03/29/2023    EGFRNORACEVR 47 03/29/2023     Lab Results   Component Value Date    ALT 13 03/29/2023    AST 19 03/29/2023    ALKPHOS 82 03/29/2023    BILITOT 0.3 03/29/2023       Micro  Microbiology Results (last 7 days)       Procedure Component Value Units Date/Time    Blood Culture #1 **CANNOT BE ORDERED STAT** [[id number]]  (Normal) Collected: 03/24/23 1851    Order Status: Completed Specimen: Blood from Hand, Left Updated: 03/29/23 2301     CULTURE, BLOOD ([facility]) No Growth at 5 days    Blood Culture #2 **CANNOT BE ORDERED STAT** [[id number]]  (Normal) Collected: 03/24/23 1900    Order Status: Completed Specimen: Blood from Hand, Right Updated: 03/29/23 2301     CULTURE, BLOOD ([facility]) No Growth at 5 days    Urine culture [[id number]]  (Abnormal)  (Susceptibility) Collected: 03/24/23 1710    Order Status: Completed Specimen: Urine Updated: 03/27/23 0813     Urine Culture 25,000-50,000 colonies/ml Enterococcus faecalis     Comment: Ampicillin results may be used to predict susceptibility to amoxicillin-clavulanate, ampicillin-sulbactam, and piperacillin-tazobactam.                 Imaging  Imaging Results              CT Head Without Contrast (Final result)  Result time 03/24/23 17:38:46      Final result by Jean Paul Alfonso MD (03/24/23 17:38:46)                   Impression:      No acute intracranial findings identified.      Electronically signed by: Jean Paul Alfonso  Date:    03/24/2023  Time:    17:38               Narrative:    EXAMINATION:  CT HEAD WITHOUT CONTRAST    CLINICAL HISTORY:  Dizziness, persistent/recurrent, cardiac or vascular cause suspected;    TECHNIQUE:  Sequential axial images were performed of the brain without contrast.    Dose product length of 946 mGycm. Automated exposure control was utilized to minimize radiation dose.    COMPARISON:  January 4, 2023..    FINDINGS:  There is no intracranial mass effect, midline shift, hydrocephalus or hemorrhage. There is no sulcal  effacement or low attenuation changes to suggest recent large vessel territory infarction. Chronic similar appearing periventricular and subcortical white matter low attenuation changes are present and are consistent with chronic microangiopathic ischemia. The ventricular system and sulcal markings prominence is consistent with atrophy. There is no acute extra axial fluid collection.  There is mild mucoperiosteal thickening of the left maxillary sinus.  Otherwise, visualized paranasal sinuses are clear without mucosal thickening, polypoidal abnormality or air-fluid levels. Mastoid air cells aeration is optimal.                                       X-Ray Chest AP Portable (Final result)  Result time 03/24/23 17:04:48      Final result by Jean Paul Alfonso MD (03/24/23 17:04:48)                   Impression:      NO ACUTE CARDIOPULMONARY PROCESS IDENTIFIED.      Electronically signed by: Jean Paul Alfonso  Date:    03/24/2023  Time:    17:04               Narrative:    EXAMINATION:  XR CHEST AP PORTABLE    CLINICAL HISTORY:  Chest Pain;    TECHNIQUE:  One    COMPARISON:  March 9, 2023.    FINDINGS:  Cardiopericardial silhouette is within normal limits. Lungs are without dense focal or segmental consolidation, congestive process, pleural effusions or pneumothorax.                                        EKG   EKG:   Results for orders placed or performed during the hospital encounter of 03/24/23   EKG 12-lead    Collection Time: 03/28/23  2:47 AM    Narrative    Test Reason : Z91.89,    Vent. Rate : 074 BPM     Atrial Rate : 074 BPM     P-R Int : 138 ms          QRS Dur : 126 ms      QT Int : 502 ms       P-R-T Axes : 060 -21 074 degrees     QTc Int : 557 ms    Normal sinus rhythm  Left bundle branch block  Abnormal ECG  When compared with ECG of 25-MAR-2023 06:12,  Nonspecific T wave abnormality, improved in Anterior-lateral leads  Confirmed by Wan Vega MD ([id number]) on 3/28/2023 11:43:19 AM    Referred By: JACQUI    SELF           Confirmed By:Wan Vega MD        Assessment and Plan  COVID-19 Stable   -continue Airborne contact and droplet isoluation status (End on 3/24)   -not supplemental O2 dependent at this time, satting well on room air   -continue to provide supportive measures   -Dextromethorphan- guaifenesin for cough  -monitor Vsq4hr + continuous pulse ox     UTI-resolved   ROXANNE 2/2 Dehydration-resolved   -Trop and EKG trend stopped on Day 2 of admission   -received Rocephin for 3 days, completed 3/27  -no longer receiving IVF or daily labs     NSTEMI type 2- resolved   Hx of Diastolic dysfunction   -Troponin downtrended on Day 2 of admission   -continue home ASA 81mg, Lopressor 50mg BID  -SL Nitro q4hr in event of chest pain     DMII, insulin dependent   -elevated CBGs throughout last 24hrs   -increased detemir to 17u twice a day, continue LSSI  + CBGx4  -continue to monitor     HTN   HLD   -continue home Amlodipine  5mg qd and Pravastatin 20mg qhs   -PRN Hydralazine 5mg q2hr in place but was not used, called RN to administer this morning with BP reading of 180s/70s   -continue to monitor    Moderate dementia  -continue home Namenda 5mg q12hr   -Delirium precautions in place  -patient's niece at bedside for redirection  -PRN Halodol 1mg q12hr if redirection is not sufficient   -melatonin 9mg qhs as sleep aid   -PT and OT consulted for eval and treat  -CM consulted for SNF placement    Code Status: FULL   Access: None   Abx: Rocephin- completed   Diet: Heart Healthy  IVF: None  DVT ppx: Heparin 5000u q12h  GI prophylaxis: Protonix 40mg PO   Home Medications held on admission: Tessalon, Allegra, Insulin Glargine, Melatonin, Metformin, Prilosec    Dispo: SNF placement pending, insurance authorization sent for [facility]  [facility] -1

## 2023-04-02 LAB
POCT GLUCOSE: 121 MG/DL (ref 70–110)
POCT GLUCOSE: 143 MG/DL (ref 70–110)
POCT GLUCOSE: 158 MG/DL (ref 70–110)
POCT GLUCOSE: 218 MG/DL (ref 70–110)
POCT GLUCOSE: 390 MG/DL (ref 70–110)

## 2023-04-02 PROCEDURE — 63600175 PHARM REV CODE 636 W HCPCS: Performed by: NURSE PRACTITIONER

## 2023-04-02 PROCEDURE — 63600175 PHARM REV CODE 636 W HCPCS: Performed by: STUDENT IN AN ORGANIZED HEALTH CARE EDUCATION/TRAINING PROGRAM

## 2023-04-02 PROCEDURE — 25000003 PHARM REV CODE 250: Performed by: STUDENT IN AN ORGANIZED HEALTH CARE EDUCATION/TRAINING PROGRAM

## 2023-04-02 PROCEDURE — 63600175 PHARM REV CODE 636 W HCPCS

## 2023-04-02 PROCEDURE — 94761 N-INVAS EAR/PLS OXIMETRY MLT: CPT

## 2023-04-02 PROCEDURE — 21400001 HC TELEMETRY ROOM

## 2023-04-02 PROCEDURE — 27000207 HC ISOLATION

## 2023-04-02 PROCEDURE — 25000003 PHARM REV CODE 250: Performed by: NURSE PRACTITIONER

## 2023-04-02 RX ORDER — AMLODIPINE BESYLATE 5 MG/1
5 TABLET ORAL DAILY
Status: DISCONTINUED | OUTPATIENT
Start: 2023-04-02 | End: 2023-04-05 | Stop reason: HOSPADM

## 2023-04-02 RX ADMIN — GUAIFENESIN AND DEXTROMETHORPHAN 10 ML: 100; 10 SYRUP ORAL at 05:04

## 2023-04-02 RX ADMIN — ASPIRIN 81 MG: 81 TABLET, COATED ORAL at 09:04

## 2023-04-02 RX ADMIN — MEMANTINE 5 MG: 5 TABLET ORAL at 08:04

## 2023-04-02 RX ADMIN — GUAIFENESIN AND DEXTROMETHORPHAN 10 ML: 100; 10 SYRUP ORAL at 11:04

## 2023-04-02 RX ADMIN — HEPARIN SODIUM 5000 UNITS: 5000 INJECTION INTRAVENOUS; SUBCUTANEOUS at 09:04

## 2023-04-02 RX ADMIN — MEMANTINE 5 MG: 5 TABLET ORAL at 09:04

## 2023-04-02 RX ADMIN — METOPROLOL TARTRATE 50 MG: 50 TABLET, FILM COATED ORAL at 08:04

## 2023-04-02 RX ADMIN — INSULIN ASPART 3 UNITS: 100 INJECTION, SOLUTION INTRAVENOUS; SUBCUTANEOUS at 08:04

## 2023-04-02 RX ADMIN — FERROUS SULFATE TAB EC 325 MG (65 MG FE EQUIVALENT) 1 EACH: 325 (65 FE) TABLET DELAYED RESPONSE at 09:04

## 2023-04-02 RX ADMIN — HEPARIN SODIUM 5000 UNITS: 5000 INJECTION INTRAVENOUS; SUBCUTANEOUS at 08:04

## 2023-04-02 RX ADMIN — AMLODIPINE BESYLATE 5 MG: 5 TABLET ORAL at 09:04

## 2023-04-02 RX ADMIN — ACETAMINOPHEN 650 MG: 325 TABLET ORAL at 08:04

## 2023-04-02 RX ADMIN — ATORVASTATIN CALCIUM 20 MG: 20 TABLET, FILM COATED ORAL at 08:04

## 2023-04-02 RX ADMIN — METOPROLOL TARTRATE 50 MG: 50 TABLET, FILM COATED ORAL at 09:04

## 2023-04-02 RX ADMIN — ACETAMINOPHEN 650 MG: 325 TABLET ORAL at 01:04

## 2023-04-02 RX ADMIN — PANTOPRAZOLE SODIUM 40 MG: 40 TABLET, DELAYED RELEASE ORAL at 09:04

## 2023-04-02 RX ADMIN — INSULIN ASPART 2 UNITS: 100 INJECTION, SOLUTION INTRAVENOUS; SUBCUTANEOUS at 12:04

## 2023-04-02 RX ADMIN — GUAIFENESIN AND DEXTROMETHORPHAN 10 ML: 100; 10 SYRUP ORAL at 12:04

## 2023-04-02 RX ADMIN — INSULIN DETEMIR 15 UNITS: 100 INJECTION, SOLUTION SUBCUTANEOUS at 08:04

## 2023-04-02 NOTE — PROGRESS NOTES
" Progress Note  Family Medicine    Admit Date: 3/24/2023  Attending: Dr. Angel Hopkins    [facility] Day: 10    SUBJECTIVE:     Interval hx:  Had ep of itching overnight to R lower back, applied lotion with resolution. Not currently itching this am. Otherwise only complaint is L knee pain that is well controlled with prn tylenol. Denies any chest pain, SOB, abd pain, nausea, or vomiting. Niece at bedside keeping pt company overnight. Only concern was regarding itch. States pt eating well, when she does not eat her meal, she will drink boost supplement.     Review of Systems:  Gen: denies fever and chills  Resp: denies shortness of breath  CV: denies chest pain and palpitations  GI: denies nausea, vomiting, abdominal pain  MSK: see above hpi    Scheduled Meds:   amLODIPine  5 mg Oral Daily    aspirin  81 mg Oral Daily    atorvastatin  20 mg Oral QHS    dextromethorphan-guaiFENesin  mg/5 ml  10 mL Oral Q6H    ferrous sulfate  1 tablet Oral Every other day    heparin (porcine)  5,000 Units Subcutaneous Q12H    insulin detemir U-100  15 Units Subcutaneous BID    memantine  5 mg Oral BID    metoprolol tartrate  50 mg Oral BID    pantoprazole  40 mg Oral Daily     Continuous Infusions:  PRN Meds:acetaminophen, dextrose 10%, dextrose 10%, dextrose, dextrose, dextrose 50%, dextrose 50%, glucagon (human recombinant), haloperidoL, hydrALAZINE, insulin aspart U-100, melatonin, naloxone, nitroGLYCERIN, sodium chloride 0.9%    Review of patient's allergies indicates:   Allergen Reactions    Lisinopril Other (See Comments)     "cough"           OBJECTIVE:     Vitals(Most Recent)      BP  Min: 149/76  Max: 173/76  Temp  Av.8 °F (36.6 °C)  Min: 97.5 °F (36.4 °C)  Max: 98.2 °F (36.8 °C)  Pulse  Av.3  Min: 61  Max: 70  Resp  Av.3  Min: 18  Max: 19  SpO2  Av.6 %  Min: 95 %  Max: 100 %             Vitals([id number])  Temp:  [97.5 °F (36.4 °C)-98.2 °F (36.8 °C)]   Pulse:  [61-70]   Resp:  [18-19]   BP: " (149-173)/(70-78)   SpO2:  [95 %-100 %]     I & O(Aetc10a)    Intake/Output Summary (Last 24 hours) at 4/2/2023 1051  Last data filed at 4/2/2023 0445  Gross per 24 hour   Intake 570 ml   Output 1400 ml   Net -830 ml       Physical Exam:  Gen: alert and oriented, NAD  CV: RRR, S1 and S2 present, no murmurs, no LE edema, radial and dorsalis pedis pulses equal and present bilaterally  Resp: breathing comfortably on room air, CTAB, no wheezes or crackles  Abd: Non-distended, non-tender  Skin: R back with +dry skin, no rash, gross lesion, or wound.   MSK: neg ble edema  Psych: calm, cooperative, answers questions appropriately      ASSESSMENT/PLAN:   COVID-19- stable  UTI- resolved  ROXANNE on CKD- resolved  Hx of Diastolic dysfunction  DM II, insulin dependent  HTN  HLD  NSTEMI, type 2 -resolved   Moderate dementia     - Continue Airborne and contact isolation for 10 days (from dx date 03/24)  - CM consulted for SNF placement, pending insurance authorization  - Continue PT/OT  - cont low dose SSI. Fasting -140s, over last 24 hrs with CBGs in 200s. Cont 15u (down from 17u yesterday) this am, will monitor during the day as pt eats. Consider decreasing night time dose of detemir to 15 pending day time cbgs. Refrain from adding more than necessary insulin due to pt's age and inpatient status.  - cont amlodipine 5mg for now; am BP 140s/70s  - Continue Lopressor, Amlodipine, and Namenda   - has not needed haldol since niece staying with pt to assist in redirection. If refractory to redirection, consider further work-up for acute change, haldol 1mg po prn if needed for pt and medical personnel safety     CODE STATUS: Full Code  Access: None  Antibiotics: Completed Rocephin  Diet: Heart healthy  DVT Prophylaxis: Heparin  GI Prophylaxis: Protonix  Fluids:  None     Disposition: Pending SNF placement    Maribeth Zarate MD  [facility]

## 2023-04-03 LAB
POCT GLUCOSE: 199 MG/DL (ref 70–110)
POCT GLUCOSE: 246 MG/DL (ref 70–110)
POCT GLUCOSE: 269 MG/DL (ref 70–110)
POCT GLUCOSE: 286 MG/DL (ref 70–110)
POCT GLUCOSE: 340 MG/DL (ref 70–110)

## 2023-04-03 PROCEDURE — 25000003 PHARM REV CODE 250: Performed by: NURSE PRACTITIONER

## 2023-04-03 PROCEDURE — 63600175 PHARM REV CODE 636 W HCPCS

## 2023-04-03 PROCEDURE — 97530 THERAPEUTIC ACTIVITIES: CPT

## 2023-04-03 PROCEDURE — 63600175 PHARM REV CODE 636 W HCPCS: Performed by: STUDENT IN AN ORGANIZED HEALTH CARE EDUCATION/TRAINING PROGRAM

## 2023-04-03 PROCEDURE — 27000207 HC ISOLATION

## 2023-04-03 PROCEDURE — 21400001 HC TELEMETRY ROOM

## 2023-04-03 PROCEDURE — 63600175 PHARM REV CODE 636 W HCPCS: Performed by: NURSE PRACTITIONER

## 2023-04-03 PROCEDURE — 25000003 PHARM REV CODE 250: Performed by: STUDENT IN AN ORGANIZED HEALTH CARE EDUCATION/TRAINING PROGRAM

## 2023-04-03 PROCEDURE — 97168 OT RE-EVAL EST PLAN CARE: CPT

## 2023-04-03 PROCEDURE — 94761 N-INVAS EAR/PLS OXIMETRY MLT: CPT

## 2023-04-03 RX ADMIN — ACETAMINOPHEN 650 MG: 325 TABLET ORAL at 05:04

## 2023-04-03 RX ADMIN — ACETAMINOPHEN 650 MG: 325 TABLET ORAL at 08:04

## 2023-04-03 RX ADMIN — Medication 9 MG: at 08:04

## 2023-04-03 RX ADMIN — GUAIFENESIN AND DEXTROMETHORPHAN 10 ML: 100; 10 SYRUP ORAL at 05:04

## 2023-04-03 RX ADMIN — INSULIN ASPART 2 UNITS: 100 INJECTION, SOLUTION INTRAVENOUS; SUBCUTANEOUS at 05:04

## 2023-04-03 RX ADMIN — INSULIN ASPART 1 UNITS: 100 INJECTION, SOLUTION INTRAVENOUS; SUBCUTANEOUS at 08:04

## 2023-04-03 RX ADMIN — HEPARIN SODIUM 5000 UNITS: 5000 INJECTION INTRAVENOUS; SUBCUTANEOUS at 08:04

## 2023-04-03 RX ADMIN — ASPIRIN 81 MG: 81 TABLET, COATED ORAL at 08:04

## 2023-04-03 RX ADMIN — ACETAMINOPHEN 650 MG: 325 TABLET ORAL at 03:04

## 2023-04-03 RX ADMIN — INSULIN DETEMIR 15 UNITS: 100 INJECTION, SOLUTION SUBCUTANEOUS at 08:04

## 2023-04-03 RX ADMIN — INSULIN ASPART 4 UNITS: 100 INJECTION, SOLUTION INTRAVENOUS; SUBCUTANEOUS at 12:04

## 2023-04-03 RX ADMIN — GUAIFENESIN AND DEXTROMETHORPHAN 10 ML: 100; 10 SYRUP ORAL at 11:04

## 2023-04-03 RX ADMIN — MEMANTINE 5 MG: 5 TABLET ORAL at 08:04

## 2023-04-03 RX ADMIN — AMLODIPINE BESYLATE 5 MG: 5 TABLET ORAL at 08:04

## 2023-04-03 RX ADMIN — ATORVASTATIN CALCIUM 20 MG: 20 TABLET, FILM COATED ORAL at 08:04

## 2023-04-03 RX ADMIN — METOPROLOL TARTRATE 50 MG: 50 TABLET, FILM COATED ORAL at 08:04

## 2023-04-03 RX ADMIN — PANTOPRAZOLE SODIUM 40 MG: 40 TABLET, DELAYED RELEASE ORAL at 08:04

## 2023-04-03 NOTE — PLAN OF CARE
Problem: Occupational Therapy  Goal: Occupational Therapy Goal  Description: Patient will perform feeding with setup seated EOB or at recliner chair with good tolerance for sitting up OOB 2 hours. Met 4/3    All other goals updated at ReEval 4/3; pt progressing toward all following goals, continue therapy toward goals:    Patient will perform grooming with setup assist while at sink at WC level.      Patient will perform toileting with min assist using BSC.    Patient will perform toilet transfer with CG assist with use of RW.    Patient will perform upper body dressing with setup assist while seated EOB.         Outcome: Ongoing, Progressing

## 2023-04-03 NOTE — PT/OT/SLP PROGRESS
Physical Therapy Treatment    Patient Name:  Michelle Saenz   MRN:  [id number]    Recommendations     Discharge Recommendations:  nursing facility, skilled   Discharge Equipment Recommendations: bedside commode, wheelchair, shower chair, rollator   Equipment to be obtained for discharge:   possible RW vs rollator  Barriers to discharge: Barriers: Severity of deficits, Level of skilled assistance required, Decreased endurance, Limited family support, and Medical diagnosis    Assessment     Michelle Saenz is a 89 y.o. female admitted with a medical diagnosis of   1. Urinary tract infection without hematuria, site unspecified    2. COVID-19    3. Chronic kidney disease, unspecified CKD stage    4. NSTEMI (non-ST elevated myocardial infarction)    5. Moderate dementia associated with other underlying disease, without behavioral disturbance, psychotic disturbance, mood disturbance, or anxiety    6. Spondylosis of lumbar spine    7. Vitamin D deficiency    8. Type 2 diabetes mellitus with hyperglycemia, with long-term current use of insulin    9. Weakness    10. Fatigue, unspecified type    11. Acute cough    12. Pharyngitis, unspecified etiology    13. Elevated troponin    14. At risk for prolonged QT interval syndrome      Patient was able to remain in chair today for extended period of time following OT session. She did have difficulty returning to bed due to low chair height and pain.    Patient continues to demonstrate a need for therapy on a daily basis as patient continues to demonstrate decreased independence with functional mobility.     She presents with the following impairments/functional limitations:  weakness, impaired endurance, impaired self care skills, impaired functional mobility, gait instability, impaired balance, pain, decreased lower extremity function, decreased upper extremity function .    Rehab Prognosis: Fair.    Patient would benefit from continued skilled acute PT services to:  address above listed impairments/functional limitations; receive patient/caregiver education; reduce fall risk; and maximize independency/safety with functional mobility.        Recent Surgery: * No surgery found *      Plan     During this hospitalization, patient to be seen  (3-5 x week) to address the identified rehab impairments via gait training, therapeutic activities, therapeutic exercises and progress toward the following goals:    Plan of Care Expires:  04/26/23    Subjective     Communicated with patient's nurse myke prior to session.    Patient agreeable to participate in treatment session.    Chief Complaint: pain in knee  Patient/Family Comments/goals: none stated  Pain/Comfort:  Pain Rating 1: 5/10  Location - Side 1: Left  Location 1: knee  Pain Addressed 1: Reposition, Cessation of Activity  Pain Rating Post-Intervention 1: 5/10    Objective     Patient found up in chair niece prsent with peripheral IV, telemetry, PureWick, pulse ox (continuous)  upon PT entry to room.    General Precautions: Standard, airborne, droplet, fall, contact   Orthopedic Precautions:N/A   Braces: N/A  Respiratory Status: room air    Functional Mobility:    Bed Mobility:  Sit to Supine: moderate assistance with cues for proper technique    Transfers:  Chair to bed total assistance  to stand from chair but mod A to complete the f/b to bed with rolling walker and cues for foot placement and walker management using Stand Pivot. Pt with posterior COM while turning to sit on bed. Pt wanting to sit early. Small steps with flexed posture and decreased WB    Gait:  unable      Balance  Patient demonstrated static sitting on level surface with Standby Assistance and  no  verbal cues.     Therapeutic Activities     Patient assisted with functional mobility as noted above.  Patient educated on the importance of early mobility to prevent functional decline during hospital stay.  Patient educated on PT Plan of Care and role of PT in  acute care.  Patient was instructed to utilize staff assistance for mobility/transfers.  White board updated regarding patient's safest level of mobility with staff assistance.  Patient is appropriate for OOB w/ therapy staff only,     Patient left with HOB elevated with all lines intact, call button in reach, nurse notified, and niece present..    Goals     Multidisciplinary Problems       Physical Therapy Goals          Problem: Physical Therapy    Goal Priority Disciplines Outcome Goal Variances Interventions   Physical Therapy Goal     PT, PT/OT Ongoing, Progressing     Description: Goals to be met by: discharge     Patient will increase functional independence with mobility by performin. Supine to sit with MInimal Assistance  2. Sit to supine with MInimal Assistance  3. Sit to stand transfer with Minimal Assistance  4. Bed to chair transfer with Minimal Assistance using Rolling Walker  5. Gait  x 50 feet with Minimal Assistance using Rolling Walker.                          Time Tracking     PT Received On: 23  PT Start Time: 1302     PT Stop Time: 1314  PT Total Time (min): 12 min     Billable Minutes: Therapeutic Activity 2023

## 2023-04-03 NOTE — PT/OT/SLP RE-EVAL
Occupational Therapy   Re-evaluation    Name: Michelle Saenz  MRN: [id number]  Admitting Diagnosis:  NSTEMI, COVID 19, dementia  Recent Surgery: * No surgery found *      Recommendations:     Discharge Recommendations: nursing facility, skilled  Discharge Equipment Recommendations: none  Barriers to discharge:  None    Assessment:     Michelle Saenz is a 89 y.o. female with a medical diagnosis of <principal problem not specified>.  She presents with functional decline, lethargy and trouble maintaining alertness even during sessions, limited with transfers by c/o L knee pain which increases with weightbearing.  Performance deficits affecting function are weakness, impaired endurance, impaired self care skills, impaired functional mobility, impaired balance, impaired cognition, decreased lower extremity function, decreased safety awareness, pain, decreased ROM, impaired fine motor, edema.      Rehab Prognosis:  good; patient would benefit from acute skilled OT services to address these deficits and reach maximum level of function.       Plan:     Patient to be seen  (2-5x/wk, M-F) to address the above listed problems via self-care/home management, therapeutic activities, therapeutic exercises  Plan of Care Expires:  (DC)  Plan of Care Reviewed with: patient, other (see comments) (niece)    Subjective     Chief Complaint: L knee pain, fatigue  Patient/Family stated goals: maximize functional independence for DC to SNF and most likely to NH at highest quality of life available  Communicated with: nurse Woods prior to session.  Pain/Comfort:  Pain Rating 1: 5/10  Location - Side 1: Left  Location 1: knee  Pain Addressed 1: Pre-medicate for activity, Reposition, Distraction, Nurse notified, Cessation of Activity  Pain Rating Post-Intervention 1: 3/10    Objective:     Communicated with: nurse prior to session.  Patient found HOB elevated with: peripheral IV, telemetry, PureWick, pulse ox (continuous) upon OT  entry to room.    General Precautions: Standard, airborne, contact, droplet, fall  Orthopedic Precautions: N/A  Braces: N/A  Respiratory Status: Room air    Occupational Performance:    Bed Mobility:    Patient completed Supine to Sit with moderate assistance    Functional Mobility/Transfers:  Patient completed Bed <> Chair Transfer using Step Transfer technique with moderate assistance with rolling walker  Functional Mobility: NT    Activities of Daily Living:  Feeding:  stand by assistance with cues to initiate activity and to encourage increased intake  Grooming: stand by assistance and contact guard assistance seated EOB to brush teeth, wash face, etc.  Upper Body Dressing: moderate assistance seated EOB  Lower Body Dressing: maximal assistance seated EOB especially for threading LLE  Toileting: total assistance using Purewick and unsafe to tf to BSC at this time, will progress since improvement in ability to tf to chair today with mod A    Cognitive/Visual Perceptual:  Cognitive/Psychosocial Skills:     -       Oriented to: Person and Situation   -       Follows Commands/attention:Inattentive and Follows one-step commands  -       Safety awareness/insight to disability: impaired     Physical Exam:  JOSEFINA ANTOINE WFL, strength 3/5 at shoulders, 3+/5 all joints distal, limited by weakness and easily fatigued      Treatment & Education:  Pt. And niece in room educated on OT goals, POC, orientation to environment, use of call bell for assist with transfers OOB or for any other needs due to fall risk.  Goal for today is OOB 2 hours to eat a meal; nurse reported no issues thus far and lunch will be served soon at time of writing this eval.  Pt progressing well, with good potential, but limited by fatigue.  Note improvement today with weightbearing at L knee during tf with RW; pt able to take steps and shift weight onto LLE without c/o increased pain.      Patient left up in chair with all lines intact, call button in  reach, nurse notified, and pt's niece present    GOALS:   Multidisciplinary Problems       Occupational Therapy Goals          Problem: Occupational Therapy    Goal Priority Disciplines Outcome Interventions   Occupational Therapy Goal     OT, PT/OT Ongoing, Progressing    Description: Patient will perform feeding with setup seated EOB or at recliner chair with good tolerance for sitting up OOB 2 hours. Met 4/3    All other goals updated at ReEval 4/3; pt progressing toward all following goals, continue therapy toward goals:    Patient will perform grooming with setup assist while at sink at WC level.      Patient will perform toileting with min assist using BSC.    Patient will perform toilet transfer with CG assist with use of RW.    Patient will perform upper body dressing with setup assist while seated EOB.                              History:     Past Medical History:   Diagnosis Date    Dementia 6/30/2022    See 6/30/2022 note    Depression 6/30/2022    Dysphagia 6/20/2022    mild-mod dysmotility 3/2021 esophagram    Gastroesophageal reflux disease 6/20/2022    Hyperlipidemia 6/20/2022    Hypertension 6/20/2022    MAGDY (iron deficiency anemia) 6/30/2022    MAGDY: FIT neg 2020, no prior EGD/colon, no weight loss or GI s/s, will monitor and consider but not strong candidate due to age and comorbidities, cont iron, Hb 11 on 5/2022 labs at [facility]    Insomnia 6/20/2022    Osteoarthritis of both knees 6/20/2022    Moderate DJD per 9/2021 knee x-rays    Osteopenia 6/20/2022    hip T -2.1 9/2021, declined treatment    Spondylosis of lumbar spine 6/20/2022    Type 2 diabetes mellitus 6/20/2022    Vitamin D deficiency 6/20/2022         Past Surgical History:   Procedure Laterality Date    CYSTOSCOPY  2018       Time Tracking:     OT Date of Treatment: 04/03/23  OT Start Time: 0905  OT Stop Time: 0928  OT Total Time (min): 23 min    Billable Minutes:Jone 23    4/3/2023

## 2023-04-03 NOTE — PLAN OF CARE
TRANSITION OF CARE PROGRESS NOTE    I have received checkout from Dr. Marie regarding this patient.     HO-I assessment:  Admission diagnosis: Weakness [R53.1]  NSTEMI (non-ST elevated myocardial infarction) [I21.4]  Vitamin D deficiency [E55.9]  Urinary tract infection without hematuria, site unspecified [N39.0]  Fatigue, unspecified type [R53.83]  Type 2 diabetes mellitus with hyperglycemia, with long-term current use of insulin [E11.65, Z79.4]  Spondylosis of lumbar spine [M47.816]  Chronic kidney disease, unspecified CKD stage [N18.9]  Moderate dementia associated with other underlying disease, without behavioral disturbance, psychotic disturbance, mood disturbance, or anxiety [F02.B0]  COVID-19 [U07.1]   Overnight management: Monitor VS, O2 saturation, and CBG. Detemir 15u BID + LSSI. Delirium precautions. Niece at bedside. Prioritize redirection, PRN Haloperidol 1mg q12hr  in place if repeated redirection fails. Airborne contact and droplet Isolation status. Continuous cardiac monitoring.   Code status: FULL    Please call Dr. Refugio Jerry at [phone] from 04/03/2023 4PM to 04/04/2023 7AM if any issues arise.    Laina Eaton MD  [facility] [facility], HO-I

## 2023-04-03 NOTE — PROGRESS NOTES
\[facility]\"" Family Medicine Progress Note  Resident Team: [facility]   Attending Physician: Ricardo Gonzalez, *  Resident: Charla Marie MD    Date of Admit: 3/24/2023    Subjective:   Brief HPI:  89 y.o.  female with a history of dementia, HTN, HLD, MAGDY, DMII, CKD stage 3, B/L knee OA brought to the [facility] ED by EMS on 3/24/2023  with a primary complaint of Dizziness x5 days, Hx given by  via telephone.    Interval History: No acute events overnight. Patient doing well, continues to work with PT. Eating and drinking ok, No fevers, chills, abd pain, nausea, vomiting, CP, or SOB. Niece does reports patient complaining of pain in L knee.     ROS:  As per HPI    Objective:   Vitals:  Temp: 98.2 °F (36.8 °C) (04/03 0814)  Pulse: 66 (04/03 0814)  Resp: 18 (04/03 0814)  BP: 157/69 (04/03 0814)  SpO2: 98 % (04/03 0814)    Physical Examination:  General:  no acute distress  Respiratory: clear to auscultation bilaterally, non labored breathing, on RA  Cardiovascular: regular rate and rhythm, no edema, 2+ peripheral pulses  Gastrointestinal: soft, non-tender, non-distended with normal bowel sounds  Musculoskeletal: Ambulation with assistance and rolling walker,   Integumentary: no rashes or skin lesions present, skin dry      Laboratory:  CMP:  Recent Labs   Lab 03/28/23  0414 03/29/23  0332    140   K 4.3 4.1   CHLORIDE 102 107   CO2 23 26   BUN 22.2* 28.0*   CREATININE 1.41* 1.09*   GLUCOSE 339* 127*   CALCIUM 9.5 9.4   ALBUMIN 3.0* 2.7*   BILITOT 0.4 0.3   AST 19 19   ALT 13 13   ALKPHOS 91 82     CBC:No results for input(s): WBC, ABSNEUTRO, RBC, HGB, HCT, PLT, MCV, RDW in the last 168 hours.    Electrolytes:  Recent Labs   Lab 03/28/23  0414 03/29/23  0332 03/30/23  0354    140  --    K 4.3 4.1  --    CHLORIDE 102 107  --    CALCIUM 9.5 9.4  --    PHOS 2.6 2.9 3.5       24hr CBG:  Recent Labs   Lab 04/02/23  1156 04/02/23  1547 04/02/23  1952 04/03/23  0512 04/03/23  0812   POCTGLUCOSE 218* 158*  390* 199* 286*       Radiology:  No results found in the last 24 hours.     Assessment & Plan:     COVID-19- stable  Moderate Dementia  DM II, insulin dependent  Hx of Diastolic Dysfunction  HTN  HLD     - Continue Airborne and contact isolation for 10 days (from dx date 03/24; today 04/03/2023 makes 10 days of isolation from positive COVID-19 test)  - CM consulted for SNF placement, pending insurance authorization  - Continue PT/OT  - LSSI, Insulin Detemir 15u BID  - cont amlodipine 5mg, will consider increasing amlodipine to 10mg if BP remain above 160s  - Continue Lopressor, Amlodipine, and Namenda   - Delirium/sundowning under control at this time, family remains at bedside for redirection and orientation    UTI- resolved  ROXANNE on CKD- resolved  NSTEMI, type 2 -resolved     CODE STATUS: Full Code  Access: No IV access at this time  Antimicrobials: None  Diet: Diabetic  DVT Prophylaxis: Heparin  GI Prophylaxis: None  Fluids: None    Disposition: Patient currently awaiting completion of 10 days isolation from COVID-19 infection to be transitioned to NH. She is clinically improved but remains needing assistance with ambulation and ADLs.      A total of 20 mins were spent on this encounter with this patient, which include interviewing patient and/or family, chart reviewing, coordinating care, examining patient, and documenting.    Charla Marie MD  [facility]  PGY-3

## 2023-04-04 LAB
POCT GLUCOSE: 138 MG/DL (ref 70–110)
POCT GLUCOSE: 162 MG/DL (ref 70–110)
POCT GLUCOSE: 204 MG/DL (ref 70–110)
POCT GLUCOSE: 229 MG/DL (ref 70–110)

## 2023-04-04 PROCEDURE — 97530 THERAPEUTIC ACTIVITIES: CPT

## 2023-04-04 PROCEDURE — 25000003 PHARM REV CODE 250: Performed by: NURSE PRACTITIONER

## 2023-04-04 PROCEDURE — 25000003 PHARM REV CODE 250: Performed by: STUDENT IN AN ORGANIZED HEALTH CARE EDUCATION/TRAINING PROGRAM

## 2023-04-04 PROCEDURE — 63600175 PHARM REV CODE 636 W HCPCS

## 2023-04-04 PROCEDURE — 94761 N-INVAS EAR/PLS OXIMETRY MLT: CPT

## 2023-04-04 PROCEDURE — 97535 SELF CARE MNGMENT TRAINING: CPT

## 2023-04-04 PROCEDURE — 63600175 PHARM REV CODE 636 W HCPCS: Performed by: NURSE PRACTITIONER

## 2023-04-04 PROCEDURE — 21400001 HC TELEMETRY ROOM

## 2023-04-04 PROCEDURE — 63600175 PHARM REV CODE 636 W HCPCS: Performed by: STUDENT IN AN ORGANIZED HEALTH CARE EDUCATION/TRAINING PROGRAM

## 2023-04-04 RX ADMIN — GUAIFENESIN AND DEXTROMETHORPHAN 10 ML: 100; 10 SYRUP ORAL at 05:04

## 2023-04-04 RX ADMIN — INSULIN DETEMIR 15 UNITS: 100 INJECTION, SOLUTION SUBCUTANEOUS at 09:04

## 2023-04-04 RX ADMIN — METOPROLOL TARTRATE 50 MG: 50 TABLET, FILM COATED ORAL at 09:04

## 2023-04-04 RX ADMIN — GUAIFENESIN AND DEXTROMETHORPHAN 10 ML: 100; 10 SYRUP ORAL at 11:04

## 2023-04-04 RX ADMIN — GUAIFENESIN AND DEXTROMETHORPHAN 10 ML: 100; 10 SYRUP ORAL at 12:04

## 2023-04-04 RX ADMIN — FERROUS SULFATE TAB EC 325 MG (65 MG FE EQUIVALENT) 1 EACH: 325 (65 FE) TABLET DELAYED RESPONSE at 09:04

## 2023-04-04 RX ADMIN — HEPARIN SODIUM 5000 UNITS: 5000 INJECTION INTRAVENOUS; SUBCUTANEOUS at 09:04

## 2023-04-04 RX ADMIN — MEMANTINE 5 MG: 5 TABLET ORAL at 09:04

## 2023-04-04 RX ADMIN — AMLODIPINE BESYLATE 5 MG: 5 TABLET ORAL at 09:04

## 2023-04-04 RX ADMIN — INSULIN ASPART 1 UNITS: 100 INJECTION, SOLUTION INTRAVENOUS; SUBCUTANEOUS at 08:04

## 2023-04-04 RX ADMIN — INSULIN ASPART 2 UNITS: 100 INJECTION, SOLUTION INTRAVENOUS; SUBCUTANEOUS at 12:04

## 2023-04-04 RX ADMIN — ACETAMINOPHEN 650 MG: 325 TABLET ORAL at 05:04

## 2023-04-04 RX ADMIN — PANTOPRAZOLE SODIUM 40 MG: 40 TABLET, DELAYED RELEASE ORAL at 09:04

## 2023-04-04 RX ADMIN — INSULIN DETEMIR 15 UNITS: 100 INJECTION, SOLUTION SUBCUTANEOUS at 08:04

## 2023-04-04 RX ADMIN — ATORVASTATIN CALCIUM 20 MG: 20 TABLET, FILM COATED ORAL at 09:04

## 2023-04-04 RX ADMIN — ASPIRIN 81 MG: 81 TABLET, COATED ORAL at 09:04

## 2023-04-04 NOTE — PHYSICIAN QUERY
PT Name: Michelle Saenz  MR #: [id number]     DOCUMENTATION CLARIFICATION      CDS/: Silvia Perry RN             Contact information:[email or web address]  This form is a permanent document in the medical record.    Query Date: April 4, 2023    By submitting this query, we are merely seeking further clarification of documentation to reflect the severity of illness of your patient. Please utilize your independent clinical judgment when addressing the question(s) below.     The Medical Record contains the following:   Indicators   Supporting Clinical Findings Location in Medical Record    Chest Pain, Angina      Coronary Artery Disease     x EKG EKG (my interpretation): NSR, L axis deviation, no ST-T wave abnormalities, no Q-waves., PAC's noted, unchanged from previous tracings   HM note 3-25   x Troponin Troponin=0.228->0.214->0.172  0.156->0.129 Lab 3-24 to 3-25    Echo Results      Angiography     x Documentation of acute cardiac condition NSTEMI (likely secondary to demand ischemia)    NSTEMI, type 2 -resolved     NSTEMI  +COVID  - Likely demand ischemia 2/2 to CKD, dehydration or COVID     HM note 3-29      HM note 4-4    H&P   x Medication/Treatment - Start on heparin 25,000 units IV  -Continue aspirin 81mg daily H&P    Other        Provider, please clarify the cardiac diagnosis related to the above documentation:  [   ] NSTEMI   [   ] NSTEMI/Myocardial Infarction Type 2 due to  COVID   [   ] NSTEMI/Myocardial Infarction Type 2 due to please specify _________   [ x  ] Demand Ischemia   [   ] Other Cardiac Diagnosis (please specify): _______________       Please document in your progress notes daily for the duration of treatment until resolved, and include in your discharge summary.    Form No. [id number]

## 2023-04-04 NOTE — PT/OT/SLP PROGRESS
Occupational Therapy   Treatment    Name: Michelle Saenz  MRN: [id number]  Admitting Diagnosis:  NSTEMI, dementia     Recommendations:     Discharge Recommendations: nursing facility, skilled  Discharge Equipment Recommendations:  none  Barriers to discharge:  None    Assessment:     Michelle Saenz is a 89 y.o. female with a medical diagnosis of <principal problem not specified>.  She presents with slow but steady improvement, especially since decreased c/o L knee pain during transfers.  However pt is still very sleepy and falls asleep if not frequently stimulated to maintain arousal. Performance deficits affecting function are weakness, impaired endurance, impaired self care skills, impaired functional mobility, gait instability, impaired cognition.     Rehab Prognosis:  Good; patient would benefit from acute skilled OT services to address these deficits and reach maximum level of function.       Plan:     Patient to be seen  (2-5x/wk, M-F) to address the above listed problems via therapeutic exercises, therapeutic activities, self-care/home management  Plan of Care Expires:  (DC)  Plan of Care Reviewed with: patient, caregiver    Subjective     Chief Complaint: fatigue  Patient/Family Comments/goals: DC to SNF for therapy to maximize functional independence and quality of life  Pain/Comfort:  Pain Rating 1: 0/10  Pain Rating Post-Intervention 1: 0/10    Objective:     Communicated with: nurse Woods prior to session.  Patient found HOB elevated with peripheral IV, telemetry, PureWick, pulse ox (continuous) upon OT entry to room.    General Precautions: Standard, fall    Orthopedic Precautions:N/A  Braces: N/A  Respiratory Status: Room air   O2 sats maintained at % throughout session on room air    Occupational Performance:     Bed Mobility:    Patient completed Rolling/Turning to Right with minimum assistance  Patient completed Scooting/Bridging with minimum assistance  Patient completed Supine to  Sit with moderate assistance     Functional Mobility/Transfers:  Patient completed Bed <> Chair Transfer using Step Transfer technique with moderate assistance with rolling walker  Functional Mobility: NT    Activities of Daily Living:  Grooming: stand by assistance with setup of items and cues to initiate tasks, including rinsing mouth with mouthwash (pt held both mouthwash and small basin for multiple reps), washing face with washcloth, and replacing clean dentures in her mouth with good sitting balance and no cues required for sequencing tasks, maintaining good alertness throughout grooming activities  Upper Body Dressing: minimum assistance for o/h threading, no LOB during dynamic task      Treatment & Education:  Pt. educated on POC, orientation to environment, use of call bell for assist with transfers OOB or for any other needs due to fall risk once sitting up in chair.  Pt also performed alternating UE reaching while seated EOB to improve sitting balance during increasing excursions at varying levels, with SBA throughout, to chest and waist height.      Patient left up in chair with all lines intact, call button in reach, and nurse notified.  PT aware and will assist pt back to bed after lunch as tolerated.    GOALS:   Multidisciplinary Problems       Occupational Therapy Goals          Problem: Occupational Therapy    Goal Priority Disciplines Outcome Interventions   Occupational Therapy Goal     OT, PT/OT Ongoing, Progressing    Description: Patient will perform feeding with setup seated EOB or at recliner chair with good tolerance for sitting up OOB 2 hours. Met 4/3    All other goals updated at ReEval 4/3; pt progressing toward all following goals, continue therapy toward goals:    Patient will perform grooming with setup assist while at sink at WC level.      Patient will perform toileting with min assist using BSC.    Patient will perform toilet transfer with CG assist with use of RW.    Patient will  perform upper body dressing with setup assist while seated EOB.                              Time Tracking:     OT Date of Treatment: 04/04/23  OT Start Time: 1019  OT Stop Time: 1044  OT Total Time (min): 25 min    Billable Minutes:Self Care/Home Management 15  Therapeutic Activity 10    OT/MARY BETH: OT          4/4/2023

## 2023-04-04 NOTE — PT/OT/SLP PROGRESS
Physical Therapy  Missed Treatment Note    Patient Name:  Michelle Saenz   MRN:  [id number]    Patient not seen today secondary to Patient unwilling to participate, Other (Comment) (pt was recently assisted back to bed with nursing). Will follow-up as scheduled.

## 2023-04-04 NOTE — PROGRESS NOTES
[facility] Family Medicine Progress Note  Resident Team: [facility]     Attending Physician: Ricardo Gonzalez, *  Resident: Charla Marie MD and Misti De La Torre MD    Date of Admit: 3/24/2023    Subjective:   Brief HPI:  89 y.o.  female with a history of dementia, HTN, HLD, MAGDY, DMII, CKD stage 3, B/L knee OA brought to the [facility] ED by EMS on 3/24/2023  with a primary complaint of Dizziness x5 days, Hx given by  via telephone.    Interval History:   No acute events overnight. Patient doing well overall. Niece at bedside reporting pt doing well. Minimal occasional non-productive cough.     ROS:  As per HPI    Objective:   Vitals:  Temp: 98 °F (36.7 °C) (04/04 0831)  Pulse: 61 (04/04 0831)  Resp: 18 (04/04 0831)  BP: 114/64 (04/04 0831)  SpO2: 99 % (04/04 0831)    Physical Examination:  General:  no acute distress  Respiratory: clear to auscultation bilaterally, non labored breathing, on RA  Cardiovascular: regular rate and rhythm, no edema, 2+ peripheral pulses  Gastrointestinal: soft, non-tender, non-distended with normal bowel sounds  Musculoskeletal: Ambulation with assistance and rolling walker  Integumentary: no rashes or skin lesions present, skin dry      Laboratory:  CMP:  Recent Labs   Lab 03/29/23  0332      K 4.1   CHLORIDE 107   CO2 26   BUN 28.0*   CREATININE 1.09*   GLUCOSE 127*   CALCIUM 9.4   ALBUMIN 2.7*   BILITOT 0.3   AST 19   ALT 13   ALKPHOS 82     CBC:No results for input(s): WBC, ABSNEUTRO, RBC, HGB, HCT, PLT, MCV, RDW in the last 168 hours.    Electrolytes:  Recent Labs   Lab 03/29/23  0332 03/30/23  0354     --    K 4.1  --    CHLORIDE 107  --    CALCIUM 9.4  --    PHOS 2.9 3.5       24hr CBG:  Recent Labs   Lab 04/03/23  0812 04/03/23  1143 04/03/23  1559 04/03/23  2000 04/04/23  0854   POCTGLUCOSE 286* 340* 246* 269* 138*       Radiology:  No results found in the last 24 hours.     Assessment & Plan:     COVID-19- stable  Moderate Dementia  DM II, insulin  dependent  Hx of Diastolic Dysfunction  HTN  HLD     - COVID: 10 day precautions complete on 4/3/23.   - CM consulted for SNF placement, pending insurance authorization  - Continue PT/OT  - LSSI, Insulin Detemir 15u BID  - cont amlodipine 5mg. Consider increasing amlodipine to 10mg if BP remain above 160's  - Continue Lopressor, Amlodipine, and Namenda   - Delirium/sundowning under control at this time, family remains at bedside for redirection and orientation    UTI- resolved  ROXANNE on CKD- resolved  NSTEMI, type 2 -resolved     CODE STATUS: Full Code  Access: No IV access at this time  Antimicrobials: None  Diet: Diabetic  DVT Prophylaxis: Heparin  GI Prophylaxis: None  Fluids: None    Disposition: Clinically improved but remains in need of assistance with ambulation and ADLs. 10 day precautions complete on 4/3/23. Awaiting placement, pending insurance.        Misti Robins M.D.  [facility] -2

## 2023-04-04 NOTE — PROGRESS NOTES
Inpatient Nutrition Assessment    Admit Date: 3/24/2023   Total duration of encounter: 11 days     Nutrition Recommendation/Prescription     Continue heart healthy/diabetic diet (hx DM)  Boost Glucose Control (provides 250 kcal, 14 g protein per serving)   3. MVI/fe  4. Biweekly wt  5. If appetite remains --decreased--consider megace  Will monitor nutrition status   Communication of Recommendations: reviewed with nurse    Nutrition Assessment     Malnutrition Assessment/Nutrition-Focused Physical Exam    Malnutrition in the context of acute illness or injury  Degree of Malnutrition: severe malnutrition  Energy Intake: </= 50% of estimated energy requirement for >/= 5 days  Interpretation of Weight Loss: >5% in 1 month  Body Fat:does not meet criteria  Area of Body Fat Loss: does not meet criteria  Muscle Mass Loss: does not meet criteria  Area of Muscle Mass Loss: does not meet criteria  Fluid Accumulation: unable to obtain  Edema: unable to obtain   Reduced  Strength: unable to obtain  A minimum of two characteristics is recommended for diagnosis of either severe or non-severe malnutrition.    Chart Review    Reason Seen: continuous nutrition monitoring and follow-up    Malnutrition Screening Tool Results   Have you recently lost weight without trying?: No  Have you been eating poorly because of a decreased appetite?: No   MST Score: 0     Diagnosis:  NSTEMI, COVID +, UTI, CKD, CHF, MAGDY, DM, HTN, HLD, dementia    Relevant Medical History: dementia, HTN, HLD, MAGDY, DM, CKD    Nutrition-Related Medications: ASA, atorvastatin, rocephin, FeSO4, insulin pantoprazole ; lopressor  Calorie Containing IV Medications: no significant kcals from medications at this time    Nutrition-Related Labs:  (3-27) H?H 12.2/39.3 Gluc 307(H) Bun 15.8 Cr 1.0 GFR 49   3/29 Gluc 127 Bun 28 Cr 1.0   (4/4)no new labs     Diet/PN Order: Diet heart healthy Diabetic  Oral Supplement Order: Boost Glucose Control  Tube Feeding Order:  "none  Appetite/Oral Intake: poor/25-50% of meals; pt does drink boost   Factors Affecting Nutritional Intake: decreased appetite  Food/Amish/Cultural Preferences: none reported  Food Allergies: none reported    Skin Integrity: intact  Wound(s):   none reported     Comments  (4/4) Pt sleeping during rounds; family member at bedside; reported pt continues to eat small amounts of food but does drink oral supplement; encouraged po intake; no new wt. No new labs. Suggest Megace to stimulate appetite.     (3/31) Pt sleeping during rounds; family member is at bedside--reported pt eats small amount at meal time --25-50% meals but does drink boost supplement. Encouraged supplement use. Awaiting SNF placement.  Labs acknowledged.     (3/27) Pt with decreased po intake approx week; ate 25% breakfast/drank milk ; will order boost shake for added nutrition. Pt unsure UBW--current wt 62.1kg; wt 2 weeks ago 74kg--? 17% wt loss; will track wts. Encourage oral intake; consider megace if appetite remains decreased. Gluc (H)--hx DM--will add no concentrated sweets.     Anthropometrics    Height: 5' 5" (165.1 cm)    Last Weight: 62.1 kg (137 lb) (03/27/23 0747) Weight Method: Bed Scale  BMI (Calculated): 22.8  BMI Classification: normal (BMI 18.5-24.9)     Ideal Body Weight (IBW), Female: 125 lb     % Ideal Body Weight, Female (lb): 109.6 %                    Usual Body Weight (UBW), kg:  (pt unsure UBW--EMR --prior wts 160-165#; will track)        Usual Weight Provided By: EMR weight history    Wt Readings from Last 5 Encounters:   03/27/23 62.1 kg (137 lb)   03/09/23 74.8 kg (165 lb)   02/02/23 75.5 kg (166 lb 7.2 oz)   01/04/23 76 kg (167 lb 8.8 oz)   12/21/22 76.9 kg (169 lb 8.5 oz)     Weight Change(s) Since Admission:  Admit Weight: 94.3 kg (208 lb) (03/24/23 1605)  3/27--today wt 62.1kg ; will track wts--prior wt approx 74kg.     Estimated Needs    Weight Used For Calorie Calculations: 62.1 kg (136 lb 14.5 oz)  Energy " Calorie Requirements (kcal): 1552 kcal/d; 25 zulema/kg  Energy Need Method: Kcal/kg  Weight Used For Protein Calculations: 62.1 kg (136 lb 14.5 oz)  Protein Requirements: 80 gm protein/d; 1.3 gm/kg  Fluid Requirements (mL): 1552 ml/d; 1ml/zulema  Temp (24hrs), Av.1 °F (36.7 °C), Min:97.7 °F (36.5 °C), Max:98.5 °F (36.9 °C)         Enteral Nutrition    Patient not receiving enteral nutrition at this time.    Parenteral Nutrition    Patient not receiving parenteral nutrition support at this time.    Evaluation of Received Nutrient Intake    Calories: not meeting estimated needs  Protein: not meeting estimated needs    Patient Education    Not applicable.    Nutrition Diagnosis     PES: Malnutrition related to acute illness as evidenced by poor appetite approx week;  Wt loss--.> 5% month (continues)    Interventions/Goals     Intervention(s): modified composition of meals/snacks, commercial beverage, multivitamin/mineral supplement therapy, and collaboration with other providers  Goal: Meet greater than 75% of nutritional needs by follow-up. (goal progressing)    Monitoring & Evaluation     Dietitian will monitor food and beverage intake, weight, and glucose/endocrine profile.  Nutrition Risk/Follow-Up: high (follow-up in 1-4 days)   Please consult if re-assessment needed sooner.

## 2023-04-05 VITALS
HEART RATE: 62 BPM | SYSTOLIC BLOOD PRESSURE: 135 MMHG | OXYGEN SATURATION: 99 % | DIASTOLIC BLOOD PRESSURE: 69 MMHG | BODY MASS INDEX: 22.82 KG/M2 | HEIGHT: 65 IN | WEIGHT: 137 LBS | TEMPERATURE: 99 F | RESPIRATION RATE: 20 BRPM

## 2023-04-05 LAB
POCT GLUCOSE: 157 MG/DL (ref 70–110)
POCT GLUCOSE: 289 MG/DL (ref 70–110)

## 2023-04-05 PROCEDURE — 25000003 PHARM REV CODE 250: Performed by: NURSE PRACTITIONER

## 2023-04-05 PROCEDURE — 63600175 PHARM REV CODE 636 W HCPCS: Performed by: STUDENT IN AN ORGANIZED HEALTH CARE EDUCATION/TRAINING PROGRAM

## 2023-04-05 PROCEDURE — 25000003 PHARM REV CODE 250: Performed by: STUDENT IN AN ORGANIZED HEALTH CARE EDUCATION/TRAINING PROGRAM

## 2023-04-05 PROCEDURE — 63600175 PHARM REV CODE 636 W HCPCS

## 2023-04-05 PROCEDURE — 63600175 PHARM REV CODE 636 W HCPCS: Performed by: NURSE PRACTITIONER

## 2023-04-05 RX ADMIN — ASPIRIN 81 MG: 81 TABLET, COATED ORAL at 09:04

## 2023-04-05 RX ADMIN — GUAIFENESIN AND DEXTROMETHORPHAN 10 ML: 100; 10 SYRUP ORAL at 12:04

## 2023-04-05 RX ADMIN — HEPARIN SODIUM 5000 UNITS: 5000 INJECTION INTRAVENOUS; SUBCUTANEOUS at 09:04

## 2023-04-05 RX ADMIN — INSULIN DETEMIR 15 UNITS: 100 INJECTION, SOLUTION SUBCUTANEOUS at 09:04

## 2023-04-05 RX ADMIN — ACETAMINOPHEN 650 MG: 325 TABLET ORAL at 10:04

## 2023-04-05 RX ADMIN — METOPROLOL TARTRATE 50 MG: 50 TABLET, FILM COATED ORAL at 09:04

## 2023-04-05 RX ADMIN — ACETAMINOPHEN 650 MG: 325 TABLET ORAL at 03:04

## 2023-04-05 RX ADMIN — MEMANTINE 5 MG: 5 TABLET ORAL at 09:04

## 2023-04-05 RX ADMIN — INSULIN ASPART 3 UNITS: 100 INJECTION, SOLUTION INTRAVENOUS; SUBCUTANEOUS at 12:04

## 2023-04-05 RX ADMIN — GUAIFENESIN AND DEXTROMETHORPHAN 10 ML: 100; 10 SYRUP ORAL at 05:04

## 2023-04-05 RX ADMIN — PANTOPRAZOLE SODIUM 40 MG: 40 TABLET, DELAYED RELEASE ORAL at 09:04

## 2023-04-05 RX ADMIN — AMLODIPINE BESYLATE 5 MG: 5 TABLET ORAL at 09:04

## 2023-04-05 NOTE — DISCHARGE SUMMARY
[facility] Discharge Summary    Admitting Physician: Ricardo Gonzalez DO  Attending Physician: Ricardo Gonzalez, *  Date of Admit: 3/24/2023  Date of Discharge: 4/5/2023    Condition: Stable  Outcome: Patient tolerated treatment/procedure well without complication and is now ready for discharge.  DISPOSITION: Skilled Nursing Facility      Discharge Diagnoses     Problem List Items Addressed This Visit          Unprioritized    Type 2 diabetes mellitus    Relevant Medications    insulin aspart U-100 injection 0-5 Units    insulin detemir U-100 injection 15 Units    Other Relevant Orders    Hemoglobin A1C (Completed)    POCT glucose    Vitamin D deficiency    Spondylosis of lumbar spine    Dementia    NSTEMI (non-ST elevated myocardial infarction)    Relevant Orders    EKG 12-lead (Completed)    Vital Signs    Bladder scan    Notify Physician    Insert saline lock    Recheck Blood Glucose:    Full code    Troponin I (Completed)    EKG 12-lead (Completed)    PT evaluate and treat    OT evaluate and treat    TSH (Completed)    Echo (Completed)    APTT (Completed)    Protime-INR (Completed)    CBC auto differential (Completed)    Admit to Inpatient (Completed)    EKG 12-lead    EKG 12-lead (Completed)    Diet heart healthy Diabetic    Elevated troponin     Other Visit Diagnoses       Urinary tract infection without hematuria, site unspecified    -  Primary    COVID-19        Chronic kidney disease, unspecified CKD stage        Weakness        Fatigue, unspecified type        Relevant Orders    TSH (Completed)    T4, Free (Completed)    Acute cough        Relevant Orders    CHLORASEPTIC SPRAY    Pharyngitis, unspecified etiology        Relevant Orders    POCT rapid strep A    At risk for prolonged QT interval syndrome        Relevant Orders    EKG 12-lead (Completed)            Principal Problem:  <principal problem not specified>    Consultants and Procedures     Consultants:  IP CONSULT TO SOCIAL  "WORK/CASE MANAGEMENT  IP CONSULT TO SOCIAL WORK/CASE MANAGEMENT    Procedures:   * No surgery found *     Brief Admission History      89 y.o.  female with a history of dementia, HTN, HLD, MAGDY, DMII, CKD stage 3, B/L knee OA brought to the [facility] ED by EMS on 3/24/2023  with a primary complaint of Dizziness x5 days with decreased PO intake, Hx given by  via telephone.    Hospital Course with Pertinent Findings     On admission patient was found to have a NSTEMI and was COVID positive. She did not report any CP when admitted, troponin was trended and trended down by the next morning. She was stable on room air and did not require any supplemental oxygen or antiviral medications for the COVID infection. Patient had a few episodes of sundowning and delirium while in the hospital, she was not responding to redirection, but did respond to a one time dose of haloperidol. Patient has since had family members at bedside during the night that has helped with orientations. Patient had elevated serum glucose and CBGs on admission, she had an insulin regimen of 15u Levemir BID which has helped with better control. Patient also worked with PT while in hospital although slightly limited secondary to osteoarthritis pain in bilateral knees, L>R.     Discharge physical exam:  Vitals  BP: (!) 147/72  Temp: 97.6 °F (36.4 °C)  Temp Source: Oral  Pulse: 66  Resp: 20  SpO2: 99 %  Height: 5' 5" (165.1 cm)  Weight: 62.1 kg (137 lb)    General: well-developed well-nourished in no acute distress  Neck: full range of motion, no thyromegaly or lymphadenopathy  Respiratory: clear to auscultation bilaterally  Cardiovascular: regular rate and rhythm, 2+ peripheral pulses, trace bilateral LE pitting edema  Gastrointestinal: soft, non-tender, non-distended with normal bowel sounds, without masses to palpation  Genitourinary: no CVA tenderness to palpation  Musculoskeletal: mild tenderness in L knee to palpation, ambulates only with " "assistance  Integumentary: no rashes or skin lesions present      TIME SPENT ON DISCHARGE: 60 minutes    Discharge Medications        Medication List        CONTINUE taking these medications      aspirin 81 MG EC tablet  Commonly known as: ECOTRIN     BD ULTRA-FINE FARIDA PEN NEEDLE 32 gauge x " Ndle  Generic drug: pen needle, diabetic  USE 1 NEW PEN NEEDLE FOR EACH INJECTION TWICE DAILY     EASY TOUCH TWIST LANCETS 33 gauge Misc  Generic drug: lancets  USE TO CHECK BLOOD SUGAR LEVELS TWICE DAILY DUE TO FLUCTUATING BLOOD SUGARS     ferrous sulfate 325 mg (65 mg iron) Tab tablet  Commonly known as: FEOSOL     fexofenadine 180 MG tablet  Commonly known as: ALLEGRA  Take 1 tablet (180 mg total) by mouth once daily.     FREESTYLE JOSHUA 2 READER [facility]  Generic drug: flash glucose scanning reader  Check CBG TID, pt is type2 diabetic, insulin requiring, with uncontrolled and fluctuating blood sugars, dxE11.9     FREESTYLE JOSHUA 2 SENSOR Kit  Generic drug: flash glucose sensor  Check CBG TID, pt is type2 diabetic, insulin requiring, with uncontrolled and fluctuating blood sugars, dxE11.9     insulin glargine 100 units/mL SubQ pen  Commonly known as: LANTUS SOLOSTAR U-100 INSULIN  Inject 30 units subq Q am, and 22 units q PM     melatonin 3 mg tablet  Commonly known as: MELATIN  Take 1 tablet (3 mg total) by mouth nightly as needed for Insomnia.     memantine 5 MG Tab  Commonly known as: NAMENDA  Take 1 tablet (5 mg total) by mouth 2 (two) times daily. For memory     metFORMIN 500 MG ER 24hr tablet  Commonly known as: GLUCOPHAGE-XR  Take 1 tablet (500 mg total) by mouth 2 (two) times daily with meals.     metoprolol tartrate 50 MG tablet  Commonly known as: LOPRESSOR  Take 1 tablet (50 mg total) by mouth 2 (two) times daily.     omeprazole 40 MG capsule  Commonly known as: PRILOSEC  Take 1 capsule (40 mg total) by mouth once daily.     pravastatin 20 MG tablet  Commonly known as: PRAVACHOL  SMARTSI Tablet(s) By Mouth " Every Evening     TRUE METRIX GLUCOSE METER Misc  Generic drug: blood-glucose meter     TRUE METRIX GLUCOSE TEST STRIP Strp  Generic drug: blood sugar diagnostic  USE TO CHECK BLOOD SUGAR LEVELS TWICE DAILY DUE TO FLUCTUATING BLOOD SUGARS     UNABLE TO FIND  Lightweight wheelchair     Dx:M47.816, M17.0     Length of need: 99            STOP taking these medications      amoxicillin 875 MG tablet  Commonly known as: AMOXIL     benzonatate 100 MG capsule  Commonly known as: TESSALON              Discharge Information:     Patient discharged in stable condition to SNF. Family not able to care for her at home, will consider long term stay in NH. Advised family to discuss with SNF once she is at the facility. Will follow up in [facility] in 1-2 weeks.    Charla Marie MD  U   PGY-3

## 2023-04-05 NOTE — PROGRESS NOTES
Pt approved for SNF placement at [facility]. Notified Dr. Marie who will put in discharge orders.

## 2023-04-05 NOTE — PLAN OF CARE
04/05/23 1141   Final Note   Anticipated Discharge Disposition SNF   Post-Acute Status   Post-Acute Authorization Placement   Post-Acute Placement Status Set-up Complete/Auth obtained     Pt discharging to [facility] via facility van. Report to be called to Glenn at [phone]. Informed nephew, Lee Kaufman, who will relay to spouse, Scotty.

## 2023-04-05 NOTE — DISCHARGE INSTRUCTIONS
Patient aware of transfer to nursing home. Explained that family couldn't care for her at home at this time. She verbalized understanding.

## 2023-04-05 NOTE — PLAN OF CARE
TRANSITION OF CARE PROGRESS NOTE    I have received checkout from Dr. Robins regarding this patient.     HO-I assessment:  Admission diagnosis: Weakness [R53.1]  NSTEMI (non-ST elevated myocardial infarction) [I21.4]  Vitamin D deficiency [E55.9]  Urinary tract infection without hematuria, site unspecified [N39.0]  Fatigue, unspecified type [R53.83]  Type 2 diabetes mellitus with hyperglycemia, with long-term current use of insulin [E11.65, Z79.4]  Spondylosis of lumbar spine [M47.816]  Chronic kidney disease, unspecified CKD stage [N18.9]  Moderate dementia associated with other underlying disease, without behavioral disturbance, psychotic disturbance, mood disturbance, or anxiety [F02.B0]  COVID-19 [U07.1]   Overnight management: Delirium precautions. Prioritize redirection, PRN Haloperidol 1mg q12hr  in place if repeated redirection fails. Airborne contact and droplet Isolation status. Telemetry monitoring.   Code status: FULL    Please call Dr. Misti Robins at [phone] from 04/04/2023 4PM to 04/05/2023 7AM if any issues arise.    Percy Ulloa MD  [facility] - PGY-1

## 2023-04-05 NOTE — PLAN OF CARE
04/05/23 0847   Medicare Message   Important Message from Medicare regarding Discharge Appeal Rights Given to patient/caregiver;Explained to patient/caregiver;Signed/date by patient/caregiver   Date IMM was signed 04/05/23   Time IMM was signed 0847

## 2023-04-06 ENCOUNTER — LAB REQUISITION (OUTPATIENT)
Dept: LAB | Facility: HOSPITAL | Age: 88
End: 2023-04-06
Payer: MEDICARE

## 2023-04-06 DIAGNOSIS — I10 ESSENTIAL (PRIMARY) HYPERTENSION: ICD-10-CM

## 2023-04-06 DIAGNOSIS — E11.9 TYPE 2 DIABETES MELLITUS WITHOUT COMPLICATIONS: ICD-10-CM

## 2023-04-06 LAB
ALBUMIN SERPL-MCNC: 2.8 G/DL (ref 3.4–4.8)
ALBUMIN/GLOB SERPL: 1.1 RATIO (ref 1.1–2)
ALP SERPL-CCNC: 74 UNIT/L (ref 40–150)
ALT SERPL-CCNC: 13 UNIT/L (ref 0–55)
AST SERPL-CCNC: 13 UNIT/L (ref 5–34)
BASOPHILS # BLD AUTO: 0.05 X10(3)/MCL (ref 0–0.2)
BASOPHILS NFR BLD AUTO: 0.5 %
BILIRUBIN DIRECT+TOT PNL SERPL-MCNC: 0.3 MG/DL
BNP BLD-MCNC: 24.7 PG/ML
BUN SERPL-MCNC: 35 MG/DL (ref 9.8–20.1)
CALCIUM SERPL-MCNC: 9.4 MG/DL (ref 8.4–10.2)
CHLORIDE SERPL-SCNC: 106 MMOL/L (ref 98–111)
CHOLEST SERPL-MCNC: 130 MG/DL
CHOLEST/HDLC SERPL: 3 {RATIO} (ref 0–5)
CO2 SERPL-SCNC: 25 MMOL/L (ref 23–31)
CREAT SERPL-MCNC: 1.27 MG/DL (ref 0.55–1.02)
DEPRECATED CALCIDIOL+CALCIFEROL SERPL-MC: 27.5 NG/ML (ref 30–80)
EOSINOPHIL # BLD AUTO: 0.15 X10(3)/MCL (ref 0–0.9)
EOSINOPHIL NFR BLD AUTO: 1.4 %
ERYTHROCYTE [DISTWIDTH] IN BLOOD BY AUTOMATED COUNT: 14.6 % (ref 11.5–17)
EST. AVERAGE GLUCOSE BLD GHB EST-MCNC: 214.5 MG/DL
GFR SERPLBLD CREATININE-BSD FMLA CKD-EPI: 40 MLS/MIN/1.73/M2
GLOBULIN SER-MCNC: 2.6 GM/DL (ref 2.4–3.5)
GLUCOSE SERPL-MCNC: 167 MG/DL (ref 75–121)
HBA1C MFR BLD: 9.1 %
HCT VFR BLD AUTO: 31.6 % (ref 37–47)
HDLC SERPL-MCNC: 43 MG/DL (ref 35–60)
HGB BLD-MCNC: 9.7 G/DL (ref 12–16)
IMM GRANULOCYTES # BLD AUTO: 0.04 X10(3)/MCL (ref 0–0.04)
IMM GRANULOCYTES NFR BLD AUTO: 0.4 %
IRON SERPL-MCNC: 46 UG/DL (ref 50–170)
LDLC SERPL CALC-MCNC: 59 MG/DL (ref 50–140)
LYMPHOCYTES # BLD AUTO: 3.85 X10(3)/MCL (ref 0.6–4.6)
LYMPHOCYTES NFR BLD AUTO: 36.6 %
MCH RBC QN AUTO: 28.4 PG (ref 27–31)
MCHC RBC AUTO-ENTMCNC: 30.7 G/DL (ref 33–36)
MCV RBC AUTO: 92.4 FL (ref 80–94)
MONOCYTES # BLD AUTO: 1.11 X10(3)/MCL (ref 0.1–1.3)
MONOCYTES NFR BLD AUTO: 10.6 %
NEUTROPHILS # BLD AUTO: 5.31 X10(3)/MCL (ref 2.1–9.2)
NEUTROPHILS NFR BLD AUTO: 50.5 %
NRBC BLD AUTO-RTO: 0 %
PLATELET # BLD AUTO: 513 X10(3)/MCL (ref 130–400)
PMV BLD AUTO: 10.1 FL (ref 7.4–10.4)
POTASSIUM SERPL-SCNC: 5 MMOL/L (ref 3.5–5.1)
PREALB SERPL-MCNC: 20 MG/DL (ref 14–37)
PROT SERPL-MCNC: 5.4 GM/DL (ref 5.8–7.6)
RBC # BLD AUTO: 3.42 X10(6)/MCL (ref 4.2–5.4)
SODIUM SERPL-SCNC: 140 MMOL/L (ref 132–146)
TRIGL SERPL-MCNC: 140 MG/DL (ref 37–140)
TSH SERPL-ACNC: 1.3 UIU/ML (ref 0.35–4.94)
VLDLC SERPL CALC-MCNC: 28 MG/DL
WBC # SPEC AUTO: 10.5 X10(3)/MCL (ref 4.5–11.5)

## 2023-04-06 PROCEDURE — 80061 LIPID PANEL: CPT | Performed by: FAMILY MEDICINE

## 2023-04-06 PROCEDURE — 84443 ASSAY THYROID STIM HORMONE: CPT | Performed by: FAMILY MEDICINE

## 2023-04-06 PROCEDURE — 83880 ASSAY OF NATRIURETIC PEPTIDE: CPT | Performed by: FAMILY MEDICINE

## 2023-04-06 PROCEDURE — 83036 HEMOGLOBIN GLYCOSYLATED A1C: CPT | Performed by: FAMILY MEDICINE

## 2023-04-06 PROCEDURE — 84134 ASSAY OF PREALBUMIN: CPT | Performed by: FAMILY MEDICINE

## 2023-04-06 PROCEDURE — 83540 ASSAY OF IRON: CPT | Performed by: FAMILY MEDICINE

## 2023-04-06 PROCEDURE — 80053 COMPREHEN METABOLIC PANEL: CPT | Performed by: FAMILY MEDICINE

## 2023-04-06 PROCEDURE — 82306 VITAMIN D 25 HYDROXY: CPT | Performed by: FAMILY MEDICINE

## 2023-04-06 PROCEDURE — 85025 COMPLETE CBC W/AUTO DIFF WBC: CPT | Performed by: FAMILY MEDICINE

## 2023-04-06 NOTE — PT/OT/SLP DISCHARGE
Physical Therapy Discharge Summary    Name: Michelle Saenz  MRN: [id number]   Principal Problem: NSTEMI (non-ST elevated myocardial infarction)     Patient Discharged from acute Physical Therapy on 2023.  Please refer to prior PT noted date on 4/3/23 for functional status.     Assessment:     Patient was discharged from acute setting to SNF. Refer to therapy initial evaluation and last progress note for initial and most recent functional status and goal achievement.  Recommendations made may be found in medical record.    Objective:     GOALS:   Multidisciplinary Problems       Physical Therapy Goals          Problem: Physical Therapy    Goal Priority Disciplines Outcome Goal Variances Interventions   Physical Therapy Goal     PT, PT/OT Adequate for Care Transition; goals not met     Description: Goals to be met by: discharge     Patient will increase functional independence with mobility by performin. Supine to sit with MInimal Assistance  2. Sit to supine with MInimal Assistance  3. Sit to stand transfer with Minimal Assistance  4. Bed to chair transfer with Minimal Assistance using Rolling Walker  5. Gait  x 50 feet with Minimal Assistance using Rolling Walker.                          Reasons for Discontinuation of Therapy Services  Transfer to alternate level of care.      Plan:     Patient Discharged to: Skilled Nursing Facility.      2023

## 2023-04-06 NOTE — PT/OT/SLP DISCHARGE
Occupational Therapy Discharge Summary    Michelle Saenz  MRN: [id number]   Principal Problem: NSTEMI (non-ST elevated myocardial infarction)   Patient Active Problem List   Diagnosis    Hypertension    Hyperlipidemia    Gastroesophageal reflux disease    Type 2 diabetes mellitus    Vitamin D deficiency    Osteopenia    Spondylosis of lumbar spine    Insomnia    Dysphagia    Osteoarthritis of both knees    MAGDY (iron deficiency anemia)    Depression    Dementia    NSTEMI (non-ST elevated myocardial infarction)    Elevated troponin          Patient Discharged from acute Occupational Therapy.  Please refer to prior OT note for functional status.    Assessment:      Patient has not met goals.    Objective:     GOALS:   Multidisciplinary Problems       Occupational Therapy Goals          Problem: Occupational Therapy    Goal Priority Disciplines Outcome Interventions   Occupational Therapy Goal     OT, PT/OT Goals not met.    Description: Patient will perform feeding with setup seated EOB or at recliner chair with good tolerance for sitting up OOB 2 hours. Met 4/3    All other goals updated at ReEval 4/3; pt progressing toward all following goals, continue therapy toward goals:    Patient will perform grooming with setup assist while at sink at WC level.      Patient will perform toileting with min assist using BSC.    Patient will perform toilet transfer with CG assist with use of RW.    Patient will perform upper body dressing with setup assist while seated EOB.                              Reasons for Discontinuation of Therapy Services  Transfer to alternate level of care.      Plan:     Patient Discharged to: Skilled Nursing Facility    4/6/2023

## 2023-07-10 PROBLEM — I21.4 NSTEMI (NON-ST ELEVATED MYOCARDIAL INFARCTION): Status: RESOLVED | Noted: 2023-03-30 | Resolved: 2023-07-10

## 2023-07-20 ENCOUNTER — LAB REQUISITION (OUTPATIENT)
Dept: LAB | Facility: HOSPITAL | Age: 88
End: 2023-07-20
Payer: MEDICARE

## 2023-07-20 DIAGNOSIS — Z29.9 ENCOUNTER FOR PROPHYLACTIC MEASURES, UNSPECIFIED: ICD-10-CM

## 2023-07-20 LAB
ALBUMIN SERPL-MCNC: 2.5 G/DL (ref 3.4–4.8)
ALBUMIN/GLOB SERPL: 0.9 RATIO (ref 1.1–2)
ALP SERPL-CCNC: 60 UNIT/L (ref 40–150)
ALT SERPL-CCNC: 6 UNIT/L (ref 0–55)
AST SERPL-CCNC: 8 UNIT/L (ref 5–34)
BASOPHILS # BLD AUTO: 0.04 X10(3)/MCL
BASOPHILS NFR BLD AUTO: 0.4 %
BILIRUBIN DIRECT+TOT PNL SERPL-MCNC: 0.1 MG/DL
BUN SERPL-MCNC: 28.4 MG/DL (ref 9.8–20.1)
CALCIUM SERPL-MCNC: 8.7 MG/DL (ref 8.4–10.2)
CHLORIDE SERPL-SCNC: 111 MMOL/L (ref 98–111)
CO2 SERPL-SCNC: 23 MMOL/L (ref 23–31)
CREAT SERPL-MCNC: 1.26 MG/DL (ref 0.55–1.02)
EOSINOPHIL # BLD AUTO: 0.14 X10(3)/MCL (ref 0–0.9)
EOSINOPHIL NFR BLD AUTO: 1.5 %
ERYTHROCYTE [DISTWIDTH] IN BLOOD BY AUTOMATED COUNT: 14.5 % (ref 11.5–17)
GFR SERPLBLD CREATININE-BSD FMLA CKD-EPI: 40 MLS/MIN/1.73/M2
GLOBULIN SER-MCNC: 2.7 GM/DL (ref 2.4–3.5)
GLUCOSE SERPL-MCNC: 67 MG/DL (ref 75–121)
HCT VFR BLD AUTO: 29.1 % (ref 37–47)
HGB BLD-MCNC: 8.7 G/DL (ref 12–16)
IMM GRANULOCYTES # BLD AUTO: 0.03 X10(3)/MCL (ref 0–0.04)
IMM GRANULOCYTES NFR BLD AUTO: 0.3 %
LYMPHOCYTES # BLD AUTO: 3.25 X10(3)/MCL (ref 0.6–4.6)
LYMPHOCYTES NFR BLD AUTO: 34.5 %
MCH RBC QN AUTO: 27.6 PG (ref 27–31)
MCHC RBC AUTO-ENTMCNC: 29.9 G/DL (ref 33–36)
MCV RBC AUTO: 92.4 FL (ref 80–94)
MONOCYTES # BLD AUTO: 0.88 X10(3)/MCL (ref 0.1–1.3)
MONOCYTES NFR BLD AUTO: 9.3 %
NEUTROPHILS # BLD AUTO: 5.09 X10(3)/MCL (ref 2.1–9.2)
NEUTROPHILS NFR BLD AUTO: 54 %
NRBC BLD AUTO-RTO: 0 %
PLATELET # BLD AUTO: 387 X10(3)/MCL (ref 130–400)
PMV BLD AUTO: 9.6 FL (ref 7.4–10.4)
POTASSIUM SERPL-SCNC: 5.1 MMOL/L (ref 3.5–5.1)
PROT SERPL-MCNC: 5.2 GM/DL (ref 5.8–7.6)
RBC # BLD AUTO: 3.15 X10(6)/MCL (ref 4.2–5.4)
SODIUM SERPL-SCNC: 144 MMOL/L (ref 132–146)
WBC # SPEC AUTO: 9.43 X10(3)/MCL (ref 4.5–11.5)

## 2023-07-20 PROCEDURE — 85025 COMPLETE CBC W/AUTO DIFF WBC: CPT | Performed by: FAMILY MEDICINE

## 2023-07-20 PROCEDURE — 80053 COMPREHEN METABOLIC PANEL: CPT | Performed by: FAMILY MEDICINE

## 2023-07-25 ENCOUNTER — LAB REQUISITION (OUTPATIENT)
Dept: LAB | Facility: HOSPITAL | Age: 88
End: 2023-07-25
Payer: MEDICARE

## 2023-07-25 DIAGNOSIS — E11.9 TYPE 2 DIABETES MELLITUS WITHOUT COMPLICATIONS: ICD-10-CM

## 2023-07-25 LAB
EST. AVERAGE GLUCOSE BLD GHB EST-MCNC: 145.6 MG/DL
HBA1C MFR BLD: 6.7 %

## 2023-07-25 PROCEDURE — 83036 HEMOGLOBIN GLYCOSYLATED A1C: CPT | Performed by: FAMILY MEDICINE

## 2023-08-16 LAB
INR PPP: 1.1
PROTHROMBIN TIME: 13.9 SECONDS (ref 11.4–14)

## 2023-09-26 ENCOUNTER — LAB REQUISITION (OUTPATIENT)
Dept: LAB | Facility: HOSPITAL | Age: 88
End: 2023-09-26
Payer: MEDICARE

## 2023-09-26 DIAGNOSIS — E11.9 TYPE 2 DIABETES MELLITUS WITHOUT COMPLICATIONS: ICD-10-CM

## 2023-09-26 LAB — GLUCOSE SERPL-MCNC: 79 MG/DL (ref 75–121)

## 2023-09-26 PROCEDURE — 82947 ASSAY GLUCOSE BLOOD QUANT: CPT | Performed by: FAMILY MEDICINE

## 2023-10-11 ENCOUNTER — LAB REQUISITION (OUTPATIENT)
Dept: LAB | Facility: HOSPITAL | Age: 88
End: 2023-10-11
Payer: MEDICARE

## 2023-10-11 DIAGNOSIS — Z29.9 ENCOUNTER FOR PROPHYLACTIC MEASURES, UNSPECIFIED: ICD-10-CM

## 2023-10-11 LAB
ALBUMIN SERPL-MCNC: 2.8 G/DL (ref 3.4–4.8)
ALBUMIN/GLOB SERPL: 1 RATIO (ref 1.1–2)
ALP SERPL-CCNC: 71 UNIT/L (ref 40–150)
ALT SERPL-CCNC: 6 UNIT/L (ref 0–55)
AST SERPL-CCNC: 8 UNIT/L (ref 5–34)
BASOPHILS # BLD AUTO: 0.04 X10(3)/MCL
BASOPHILS NFR BLD AUTO: 0.4 %
BILIRUB SERPL-MCNC: 0.3 MG/DL
BUN SERPL-MCNC: 15.6 MG/DL (ref 9.8–20.1)
CALCIUM SERPL-MCNC: 8.7 MG/DL (ref 8.4–10.2)
CHLORIDE SERPL-SCNC: 106 MMOL/L (ref 98–111)
CO2 SERPL-SCNC: 24 MMOL/L (ref 23–31)
CREAT SERPL-MCNC: 1.1 MG/DL (ref 0.55–1.02)
EOSINOPHIL # BLD AUTO: 0.17 X10(3)/MCL (ref 0–0.9)
EOSINOPHIL NFR BLD AUTO: 1.5 %
ERYTHROCYTE [DISTWIDTH] IN BLOOD BY AUTOMATED COUNT: 15.5 % (ref 11.5–17)
GFR SERPLBLD CREATININE-BSD FMLA CKD-EPI: 47 MLS/MIN/1.73/M2
GLOBULIN SER-MCNC: 2.8 GM/DL (ref 2.4–3.5)
GLUCOSE SERPL-MCNC: 123 MG/DL (ref 75–121)
HCT VFR BLD AUTO: 32.1 % (ref 37–47)
HGB BLD-MCNC: 9.7 G/DL (ref 12–16)
IMM GRANULOCYTES # BLD AUTO: 0.05 X10(3)/MCL (ref 0–0.04)
IMM GRANULOCYTES NFR BLD AUTO: 0.4 %
LYMPHOCYTES # BLD AUTO: 0.81 X10(3)/MCL (ref 0.6–4.6)
LYMPHOCYTES NFR BLD AUTO: 7.2 %
MCH RBC QN AUTO: 28.4 PG (ref 27–31)
MCHC RBC AUTO-ENTMCNC: 30.2 G/DL (ref 33–36)
MCV RBC AUTO: 94.1 FL (ref 80–94)
MONOCYTES # BLD AUTO: 0.92 X10(3)/MCL (ref 0.1–1.3)
MONOCYTES NFR BLD AUTO: 8.1 %
NEUTROPHILS # BLD AUTO: 9.3 X10(3)/MCL (ref 2.1–9.2)
NEUTROPHILS NFR BLD AUTO: 82.4 %
NRBC BLD AUTO-RTO: 0 %
PLATELET # BLD AUTO: 303 X10(3)/MCL (ref 130–400)
PMV BLD AUTO: 9.5 FL (ref 7.4–10.4)
POTASSIUM SERPL-SCNC: 5.1 MMOL/L (ref 3.5–5.1)
PROT SERPL-MCNC: 5.6 GM/DL (ref 5.8–7.6)
RBC # BLD AUTO: 3.41 X10(6)/MCL (ref 4.2–5.4)
SODIUM SERPL-SCNC: 141 MMOL/L (ref 132–146)
WBC # SPEC AUTO: 11.29 X10(3)/MCL (ref 4.5–11.5)

## 2023-10-11 PROCEDURE — 85025 COMPLETE CBC W/AUTO DIFF WBC: CPT | Performed by: FAMILY MEDICINE

## 2023-10-11 PROCEDURE — 80053 COMPREHEN METABOLIC PANEL: CPT | Performed by: FAMILY MEDICINE

## 2023-10-27 ENCOUNTER — LAB REQUISITION (OUTPATIENT)
Dept: LAB | Facility: HOSPITAL | Age: 88
End: 2023-10-27
Payer: MEDICARE

## 2023-10-27 DIAGNOSIS — E11.9 TYPE 2 DIABETES MELLITUS WITHOUT COMPLICATIONS: ICD-10-CM

## 2023-10-27 LAB
ALBUMIN SERPL-MCNC: 2.6 G/DL (ref 3.4–4.8)
ALBUMIN/GLOB SERPL: 1 RATIO (ref 1.1–2)
ALP SERPL-CCNC: 90 UNIT/L (ref 40–150)
ALT SERPL-CCNC: 6 UNIT/L (ref 0–55)
AST SERPL-CCNC: 9 UNIT/L (ref 5–34)
BASOPHILS # BLD AUTO: 0.07 X10(3)/MCL
BASOPHILS NFR BLD AUTO: 0.7 %
BILIRUB SERPL-MCNC: 0.2 MG/DL
BUN SERPL-MCNC: 14.2 MG/DL (ref 9.8–20.1)
CALCIUM SERPL-MCNC: 8.8 MG/DL (ref 8.4–10.2)
CHLORIDE SERPL-SCNC: 110 MMOL/L (ref 98–111)
CHOLEST SERPL-MCNC: 118 MG/DL
CHOLEST/HDLC SERPL: 3 {RATIO} (ref 0–5)
CO2 SERPL-SCNC: 28 MMOL/L (ref 23–31)
CREAT SERPL-MCNC: 0.97 MG/DL (ref 0.55–1.02)
EOSINOPHIL # BLD AUTO: 0.25 X10(3)/MCL (ref 0–0.9)
EOSINOPHIL NFR BLD AUTO: 2.6 %
ERYTHROCYTE [DISTWIDTH] IN BLOOD BY AUTOMATED COUNT: 15 % (ref 11.5–17)
EST. AVERAGE GLUCOSE BLD GHB EST-MCNC: 128.4 MG/DL
GFR SERPLBLD CREATININE-BSD FMLA CKD-EPI: 54 MLS/MIN/1.73/M2
GLOBULIN SER-MCNC: 2.6 GM/DL (ref 2.4–3.5)
GLUCOSE SERPL-MCNC: 67 MG/DL (ref 75–121)
HBA1C MFR BLD: 6.1 %
HCT VFR BLD AUTO: 28.9 % (ref 37–47)
HDLC SERPL-MCNC: 46 MG/DL (ref 35–60)
HGB BLD-MCNC: 8.7 G/DL (ref 12–16)
IMM GRANULOCYTES # BLD AUTO: 0.04 X10(3)/MCL (ref 0–0.04)
IMM GRANULOCYTES NFR BLD AUTO: 0.4 %
LDLC SERPL CALC-MCNC: 53 MG/DL (ref 50–140)
LYMPHOCYTES # BLD AUTO: 3.33 X10(3)/MCL (ref 0.6–4.6)
LYMPHOCYTES NFR BLD AUTO: 34.1 %
MCH RBC QN AUTO: 28.1 PG (ref 27–31)
MCHC RBC AUTO-ENTMCNC: 30.1 G/DL (ref 33–36)
MCV RBC AUTO: 93.2 FL (ref 80–94)
MONOCYTES # BLD AUTO: 1.02 X10(3)/MCL (ref 0.1–1.3)
MONOCYTES NFR BLD AUTO: 10.5 %
NEUTROPHILS # BLD AUTO: 5.05 X10(3)/MCL (ref 2.1–9.2)
NEUTROPHILS NFR BLD AUTO: 51.7 %
NRBC BLD AUTO-RTO: 0 %
PLATELET # BLD AUTO: 380 X10(3)/MCL (ref 130–400)
PMV BLD AUTO: 9.9 FL (ref 7.4–10.4)
POTASSIUM SERPL-SCNC: 4.7 MMOL/L (ref 3.5–5.1)
PREALB SERPL-MCNC: 20.1 MG/DL (ref 14–37)
PROT SERPL-MCNC: 5.2 GM/DL (ref 5.8–7.6)
RBC # BLD AUTO: 3.1 X10(6)/MCL (ref 4.2–5.4)
SODIUM SERPL-SCNC: 146 MMOL/L (ref 132–146)
TRIGL SERPL-MCNC: 93 MG/DL (ref 37–140)
TSH SERPL-ACNC: 1.75 UIU/ML (ref 0.35–4.94)
VLDLC SERPL CALC-MCNC: 19 MG/DL
WBC # SPEC AUTO: 9.76 X10(3)/MCL (ref 4.5–11.5)

## 2023-10-27 PROCEDURE — 80061 LIPID PANEL: CPT | Performed by: FAMILY MEDICINE

## 2023-10-27 PROCEDURE — 84134 ASSAY OF PREALBUMIN: CPT | Performed by: FAMILY MEDICINE

## 2023-10-27 PROCEDURE — 85025 COMPLETE CBC W/AUTO DIFF WBC: CPT | Performed by: FAMILY MEDICINE

## 2023-10-27 PROCEDURE — 84443 ASSAY THYROID STIM HORMONE: CPT | Performed by: FAMILY MEDICINE

## 2023-10-27 PROCEDURE — 80053 COMPREHEN METABOLIC PANEL: CPT | Performed by: FAMILY MEDICINE

## 2023-10-27 PROCEDURE — 83036 HEMOGLOBIN GLYCOSYLATED A1C: CPT | Performed by: FAMILY MEDICINE

## 2023-12-12 ENCOUNTER — LAB REQUISITION (OUTPATIENT)
Dept: LAB | Facility: HOSPITAL | Age: 88
End: 2023-12-12
Payer: MEDICARE

## 2023-12-12 DIAGNOSIS — E11.9 TYPE 2 DIABETES MELLITUS WITHOUT COMPLICATIONS: ICD-10-CM

## 2023-12-12 DIAGNOSIS — Z29.9 ENCOUNTER FOR PROPHYLACTIC MEASURES, UNSPECIFIED: ICD-10-CM

## 2023-12-12 LAB
APPEARANCE UR: ABNORMAL
BACTERIA #/AREA URNS AUTO: ABNORMAL /HPF
BILIRUB UR QL STRIP.AUTO: NEGATIVE
COLOR UR AUTO: ABNORMAL
EST. AVERAGE GLUCOSE BLD GHB EST-MCNC: 165.7 MG/DL
GLUCOSE SERPL-MCNC: 107 MG/DL (ref 75–121)
GLUCOSE UR QL STRIP.AUTO: NEGATIVE
HBA1C MFR BLD: 7.4 %
KETONES UR QL STRIP.AUTO: NEGATIVE
LEUKOCYTE ESTERASE UR QL STRIP.AUTO: ABNORMAL
NITRITE UR QL STRIP.AUTO: NEGATIVE
PH UR STRIP.AUTO: 6 [PH]
PROT UR QL STRIP.AUTO: NEGATIVE
RBC #/AREA URNS AUTO: ABNORMAL /HPF
RBC UR QL AUTO: ABNORMAL
SP GR UR STRIP.AUTO: <=1.005 (ref 1–1.03)
SQUAMOUS #/AREA URNS AUTO: ABNORMAL /HPF
UROBILINOGEN UR STRIP-ACNC: 0.2
WBC #/AREA URNS AUTO: ABNORMAL /HPF

## 2023-12-12 PROCEDURE — 81001 URINALYSIS AUTO W/SCOPE: CPT | Performed by: FAMILY MEDICINE

## 2023-12-12 PROCEDURE — 83036 HEMOGLOBIN GLYCOSYLATED A1C: CPT | Performed by: FAMILY MEDICINE

## 2023-12-12 PROCEDURE — 87077 CULTURE AEROBIC IDENTIFY: CPT | Mod: 91 | Performed by: FAMILY MEDICINE

## 2023-12-12 PROCEDURE — 82947 ASSAY GLUCOSE BLOOD QUANT: CPT | Performed by: FAMILY MEDICINE

## 2023-12-15 LAB
BACTERIA UR CULT: ABNORMAL
BACTERIA UR CULT: ABNORMAL

## 2024-01-19 ENCOUNTER — LAB REQUISITION (OUTPATIENT)
Dept: LAB | Facility: HOSPITAL | Age: 89
End: 2024-01-19
Payer: MEDICARE

## 2024-01-19 DIAGNOSIS — E11.9 TYPE 2 DIABETES MELLITUS WITHOUT COMPLICATIONS: ICD-10-CM

## 2024-01-19 LAB — GLUCOSE SERPL-MCNC: 164 MG/DL (ref 75–121)

## 2024-01-19 PROCEDURE — 82947 ASSAY GLUCOSE BLOOD QUANT: CPT | Performed by: FAMILY MEDICINE

## 2024-02-19 ENCOUNTER — LAB REQUISITION (OUTPATIENT)
Dept: LAB | Facility: HOSPITAL | Age: 89
End: 2024-02-19
Payer: MEDICARE

## 2024-02-19 DIAGNOSIS — E11.9 TYPE 2 DIABETES MELLITUS WITHOUT COMPLICATIONS: ICD-10-CM

## 2024-02-19 LAB — GLUCOSE SERPL-MCNC: 129 MG/DL (ref 75–121)

## 2024-02-19 PROCEDURE — 82947 ASSAY GLUCOSE BLOOD QUANT: CPT | Performed by: FAMILY MEDICINE

## 2024-03-25 ENCOUNTER — LAB REQUISITION (OUTPATIENT)
Dept: LAB | Facility: HOSPITAL | Age: 89
End: 2024-03-25
Payer: MEDICARE

## 2024-03-25 DIAGNOSIS — E11.9 TYPE 2 DIABETES MELLITUS WITHOUT COMPLICATIONS: ICD-10-CM

## 2024-03-25 LAB — GLUCOSE SERPL-MCNC: 85 MG/DL (ref 75–121)

## 2024-03-25 PROCEDURE — 82947 ASSAY GLUCOSE BLOOD QUANT: CPT | Performed by: FAMILY MEDICINE

## 2024-04-25 ENCOUNTER — LAB REQUISITION (OUTPATIENT)
Dept: LAB | Facility: HOSPITAL | Age: 89
End: 2024-04-25
Payer: MEDICARE

## 2024-04-25 DIAGNOSIS — E11.9 TYPE 2 DIABETES MELLITUS WITHOUT COMPLICATIONS: ICD-10-CM

## 2024-04-25 LAB
ALBUMIN SERPL-MCNC: 2.3 G/DL (ref 3.4–4.8)
ALBUMIN/GLOB SERPL: 0.8 RATIO (ref 1.1–2)
ALP SERPL-CCNC: 84 UNIT/L (ref 40–150)
ALT SERPL-CCNC: 5 UNIT/L (ref 0–55)
AST SERPL-CCNC: 10 UNIT/L (ref 5–34)
BASOPHILS # BLD AUTO: 0.07 X10(3)/MCL
BASOPHILS NFR BLD AUTO: 0.8 %
BILIRUB SERPL-MCNC: 0.2 MG/DL
BILIRUBIN DIRECT+TOT PNL SERPL-MCNC: <0.1 MG/DL (ref 0–?)
BUN SERPL-MCNC: 13.9 MG/DL (ref 9.8–20.1)
CALCIUM SERPL-MCNC: 8.4 MG/DL (ref 8.4–10.2)
CHLORIDE SERPL-SCNC: 111 MMOL/L (ref 98–111)
CHOLEST SERPL-MCNC: 158 MG/DL
CHOLEST/HDLC SERPL: 3 {RATIO} (ref 0–5)
CO2 SERPL-SCNC: 24 MMOL/L (ref 23–31)
CREAT SERPL-MCNC: 1.08 MG/DL (ref 0.55–1.02)
EOSINOPHIL # BLD AUTO: 0.45 X10(3)/MCL (ref 0–0.9)
EOSINOPHIL NFR BLD AUTO: 5.4 %
ERYTHROCYTE [DISTWIDTH] IN BLOOD BY AUTOMATED COUNT: 17.5 % (ref 11.5–17)
EST. AVERAGE GLUCOSE BLD GHB EST-MCNC: 142.7 MG/DL
GFR SERPLBLD CREATININE-BSD FMLA CKD-EPI: 48 MLS/MIN/1.73/M2
GLOBULIN SER-MCNC: 2.8 GM/DL (ref 2.4–3.5)
GLUCOSE SERPL-MCNC: 95 MG/DL (ref 75–121)
HBA1C MFR BLD: 6.6 %
HCT VFR BLD AUTO: 28.6 % (ref 37–47)
HDLC SERPL-MCNC: 59 MG/DL (ref 35–60)
HGB BLD-MCNC: 8.7 G/DL (ref 12–16)
IMM GRANULOCYTES # BLD AUTO: 0.03 X10(3)/MCL (ref 0–0.04)
IMM GRANULOCYTES NFR BLD AUTO: 0.4 %
LDLC SERPL CALC-MCNC: 74 MG/DL (ref 50–140)
LYMPHOCYTES # BLD AUTO: 2.62 X10(3)/MCL (ref 0.6–4.6)
LYMPHOCYTES NFR BLD AUTO: 31.6 %
MCH RBC QN AUTO: 28.1 PG (ref 27–31)
MCHC RBC AUTO-ENTMCNC: 30.4 G/DL (ref 33–36)
MCV RBC AUTO: 92.3 FL (ref 80–94)
MONOCYTES # BLD AUTO: 0.84 X10(3)/MCL (ref 0.1–1.3)
MONOCYTES NFR BLD AUTO: 10.1 %
NEUTROPHILS # BLD AUTO: 4.29 X10(3)/MCL (ref 2.1–9.2)
NEUTROPHILS NFR BLD AUTO: 51.7 %
NRBC BLD AUTO-RTO: 0 %
PLATELET # BLD AUTO: 354 X10(3)/MCL (ref 130–400)
PMV BLD AUTO: 10 FL (ref 7.4–10.4)
POTASSIUM SERPL-SCNC: 4.4 MMOL/L (ref 3.5–5.1)
PREALB SERPL-MCNC: 13.5 MG/DL (ref 14–37)
PROT SERPL-MCNC: 5.1 GM/DL (ref 5.8–7.6)
RBC # BLD AUTO: 3.1 X10(6)/MCL (ref 4.2–5.4)
SODIUM SERPL-SCNC: 144 MMOL/L (ref 132–146)
TRIGL SERPL-MCNC: 124 MG/DL (ref 37–140)
TSH SERPL-ACNC: 2.31 UIU/ML (ref 0.35–4.94)
VLDLC SERPL CALC-MCNC: 25 MG/DL
WBC # SPEC AUTO: 8.3 X10(3)/MCL (ref 4.5–11.5)

## 2024-04-25 PROCEDURE — 85025 COMPLETE CBC W/AUTO DIFF WBC: CPT | Performed by: FAMILY MEDICINE

## 2024-04-25 PROCEDURE — 80061 LIPID PANEL: CPT | Performed by: FAMILY MEDICINE

## 2024-04-25 PROCEDURE — 80053 COMPREHEN METABOLIC PANEL: CPT | Performed by: FAMILY MEDICINE

## 2024-04-25 PROCEDURE — 82248 BILIRUBIN DIRECT: CPT | Performed by: FAMILY MEDICINE

## 2024-04-25 PROCEDURE — 84443 ASSAY THYROID STIM HORMONE: CPT | Performed by: FAMILY MEDICINE

## 2024-04-25 PROCEDURE — 83036 HEMOGLOBIN GLYCOSYLATED A1C: CPT | Performed by: FAMILY MEDICINE

## 2024-04-25 PROCEDURE — 84134 ASSAY OF PREALBUMIN: CPT | Performed by: FAMILY MEDICINE

## 2024-05-23 ENCOUNTER — LAB REQUISITION (OUTPATIENT)
Dept: LAB | Facility: HOSPITAL | Age: 89
End: 2024-05-23
Payer: MEDICARE

## 2024-05-23 DIAGNOSIS — E11.9 TYPE 2 DIABETES MELLITUS WITHOUT COMPLICATIONS: ICD-10-CM

## 2024-05-23 LAB — GLUCOSE SERPL-MCNC: 112 MG/DL (ref 75–121)

## 2024-05-23 PROCEDURE — 82947 ASSAY GLUCOSE BLOOD QUANT: CPT | Performed by: FAMILY MEDICINE

## 2024-06-19 ENCOUNTER — LAB REQUISITION (OUTPATIENT)
Dept: LAB | Facility: HOSPITAL | Age: 89
End: 2024-06-19
Payer: MEDICARE

## 2024-06-19 DIAGNOSIS — E11.9 TYPE 2 DIABETES MELLITUS WITHOUT COMPLICATIONS: ICD-10-CM

## 2024-06-19 LAB — GLUCOSE SERPL-MCNC: 153 MG/DL (ref 75–121)

## 2024-06-19 PROCEDURE — 82947 ASSAY GLUCOSE BLOOD QUANT: CPT | Performed by: FAMILY MEDICINE

## 2024-07-18 ENCOUNTER — LAB REQUISITION (OUTPATIENT)
Dept: LAB | Facility: HOSPITAL | Age: 89
End: 2024-07-18
Payer: MEDICARE

## 2024-07-18 DIAGNOSIS — E11.9 TYPE 2 DIABETES MELLITUS WITHOUT COMPLICATIONS: ICD-10-CM

## 2024-07-18 LAB
EST. AVERAGE GLUCOSE BLD GHB EST-MCNC: 159.9 MG/DL
GLUCOSE SERPL-MCNC: 101 MG/DL (ref 75–121)
HBA1C MFR BLD: 7.2 %

## 2024-07-18 PROCEDURE — 83036 HEMOGLOBIN GLYCOSYLATED A1C: CPT | Performed by: FAMILY MEDICINE

## 2024-07-18 PROCEDURE — 82947 ASSAY GLUCOSE BLOOD QUANT: CPT | Performed by: FAMILY MEDICINE

## 2024-08-28 ENCOUNTER — LAB REQUISITION (OUTPATIENT)
Dept: LAB | Facility: HOSPITAL | Age: 89
End: 2024-08-28
Payer: MEDICARE

## 2024-08-28 DIAGNOSIS — E11.9 TYPE 2 DIABETES MELLITUS WITHOUT COMPLICATIONS: ICD-10-CM

## 2024-08-28 LAB — GLUCOSE SERPL-MCNC: 109 MG/DL (ref 75–121)

## 2024-08-28 PROCEDURE — 82947 ASSAY GLUCOSE BLOOD QUANT: CPT | Performed by: FAMILY MEDICINE

## 2024-09-06 NOTE — TELEPHONE ENCOUNTER
Medication: Allopurinol and atorvastatin passed protocol.   Last office visit date: 3/11/2024  Next appointment scheduled?: Yes  9/18/2024    Controlled Substance: No        Date of any Lab Results pertaining to medication:   Lab Results   Component Value Date    CHOLESTEROL 161 03/08/2024    HDL 37 (L) 03/08/2024    CALCLDL 76 03/08/2024    TRIGLYCERIDE 242 (H) 03/08/2024     Uric Acid (mg/dL)   Date Value   01/19/2022 6.0        Refilled per epic protocol.   Stepan Hernandez called and stated that patients lab was done and dropped off a [facility] and that all medications are present in the home.

## 2024-10-30 ENCOUNTER — LAB REQUISITION (OUTPATIENT)
Dept: LAB | Facility: HOSPITAL | Age: 89
End: 2024-10-30
Payer: MEDICARE

## 2024-10-30 DIAGNOSIS — E11.9 TYPE 2 DIABETES MELLITUS WITHOUT COMPLICATIONS: ICD-10-CM

## 2024-10-30 LAB
ALBUMIN SERPL-MCNC: 2.8 G/DL (ref 3.4–4.8)
ALBUMIN/GLOB SERPL: 1 RATIO (ref 1.1–2)
ALP SERPL-CCNC: 73 UNIT/L (ref 40–150)
ALT SERPL-CCNC: <5 UNIT/L (ref 0–55)
ANION GAP SERPL CALC-SCNC: 9 MEQ/L
ANISOCYTOSIS BLD QL SMEAR: ABNORMAL
AST SERPL-CCNC: 11 UNIT/L (ref 5–34)
BASOPHILS # BLD AUTO: 0.04 X10(3)/MCL
BASOPHILS NFR BLD AUTO: 0.3 %
BILIRUB SERPL-MCNC: 0.2 MG/DL
BUN SERPL-MCNC: 14.5 MG/DL (ref 9.8–20.1)
CALCIUM SERPL-MCNC: 8.6 MG/DL (ref 8.4–10.2)
CHLORIDE SERPL-SCNC: 110 MMOL/L (ref 98–111)
CHOLEST SERPL-MCNC: 127 MG/DL
CHOLEST/HDLC SERPL: 3 {RATIO} (ref 0–5)
CO2 SERPL-SCNC: 23 MMOL/L (ref 23–31)
CREAT SERPL-MCNC: 1.12 MG/DL (ref 0.55–1.02)
CREAT/UREA NIT SERPL: 13
EOSINOPHIL # BLD AUTO: 0.32 X10(3)/MCL (ref 0–0.9)
EOSINOPHIL NFR BLD AUTO: 2.8 %
ERYTHROCYTE [DISTWIDTH] IN BLOOD BY AUTOMATED COUNT: 16.3 % (ref 11.5–17)
EST. AVERAGE GLUCOSE BLD GHB EST-MCNC: 116.9 MG/DL
GFR SERPLBLD CREATININE-BSD FMLA CKD-EPI: 45 ML/MIN/1.73/M2
GLOBULIN SER-MCNC: 2.9 GM/DL (ref 2.4–3.5)
GLUCOSE SERPL-MCNC: 93 MG/DL (ref 75–121)
HBA1C MFR BLD: 5.7 %
HCT VFR BLD AUTO: 24.6 % (ref 37–47)
HDLC SERPL-MCNC: 47 MG/DL (ref 35–60)
HGB BLD-MCNC: 7.1 G/DL (ref 12–16)
HYPOCHROMIA BLD QL SMEAR: ABNORMAL
IMM GRANULOCYTES # BLD AUTO: 0.04 X10(3)/MCL (ref 0–0.04)
IMM GRANULOCYTES NFR BLD AUTO: 0.3 %
LDLC SERPL CALC-MCNC: 54 MG/DL (ref 50–140)
LYMPHOCYTES # BLD AUTO: 2.74 X10(3)/MCL (ref 0.6–4.6)
LYMPHOCYTES NFR BLD AUTO: 23.7 %
MCH RBC QN AUTO: 26 PG (ref 27–31)
MCHC RBC AUTO-ENTMCNC: 28.9 G/DL (ref 33–36)
MCV RBC AUTO: 90.1 FL (ref 80–94)
MONOCYTES # BLD AUTO: 1.06 X10(3)/MCL (ref 0.1–1.3)
MONOCYTES NFR BLD AUTO: 9.2 %
NEUTROPHILS # BLD AUTO: 7.35 X10(3)/MCL (ref 2.1–9.2)
NEUTROPHILS NFR BLD AUTO: 63.7 %
NRBC BLD AUTO-RTO: 0 %
PLATELET # BLD AUTO: 368 X10(3)/MCL (ref 130–400)
PLATELET # BLD EST: ADEQUATE 10*3/UL
PMV BLD AUTO: 10.3 FL (ref 7.4–10.4)
POTASSIUM SERPL-SCNC: 4.3 MMOL/L (ref 3.5–5.1)
PREALB SERPL-MCNC: 19.8 MG/DL (ref 14–37)
PROT SERPL-MCNC: 5.7 GM/DL (ref 5.8–7.6)
RBC # BLD AUTO: 2.73 X10(6)/MCL (ref 4.2–5.4)
RBC MORPH BLD: ABNORMAL
SODIUM SERPL-SCNC: 142 MMOL/L (ref 136–145)
TRIGL SERPL-MCNC: 129 MG/DL (ref 37–140)
TSH SERPL-ACNC: 3.09 UIU/ML (ref 0.35–4.94)
VLDLC SERPL CALC-MCNC: 26 MG/DL
WBC # BLD AUTO: 11.55 X10(3)/MCL (ref 4.5–11.5)

## 2024-10-30 PROCEDURE — 84443 ASSAY THYROID STIM HORMONE: CPT | Performed by: FAMILY MEDICINE

## 2024-10-30 PROCEDURE — 80061 LIPID PANEL: CPT | Performed by: FAMILY MEDICINE

## 2024-10-30 PROCEDURE — 80053 COMPREHEN METABOLIC PANEL: CPT | Performed by: FAMILY MEDICINE

## 2024-10-30 PROCEDURE — 84134 ASSAY OF PREALBUMIN: CPT | Performed by: FAMILY MEDICINE

## 2024-10-30 PROCEDURE — 85025 COMPLETE CBC W/AUTO DIFF WBC: CPT | Performed by: FAMILY MEDICINE

## 2024-10-30 PROCEDURE — 83036 HEMOGLOBIN GLYCOSYLATED A1C: CPT | Performed by: FAMILY MEDICINE

## 2024-11-05 ENCOUNTER — LAB REQUISITION (OUTPATIENT)
Dept: LAB | Facility: HOSPITAL | Age: 89
End: 2024-11-05
Payer: MEDICARE

## 2024-11-05 DIAGNOSIS — Z29.9 ENCOUNTER FOR PROPHYLACTIC MEASURES, UNSPECIFIED: ICD-10-CM

## 2024-11-05 LAB
BASOPHILS # BLD AUTO: 0.06 X10(3)/MCL
BASOPHILS NFR BLD AUTO: 0.5 %
EOSINOPHIL # BLD AUTO: 0.3 X10(3)/MCL (ref 0–0.9)
EOSINOPHIL NFR BLD AUTO: 2.6 %
ERYTHROCYTE [DISTWIDTH] IN BLOOD BY AUTOMATED COUNT: 16 % (ref 11.5–17)
HCT VFR BLD AUTO: 23.6 % (ref 37–47)
HGB BLD-MCNC: 7 G/DL (ref 12–16)
IMM GRANULOCYTES # BLD AUTO: 0.05 X10(3)/MCL (ref 0–0.04)
IMM GRANULOCYTES NFR BLD AUTO: 0.4 %
LYMPHOCYTES # BLD AUTO: 2.68 X10(3)/MCL (ref 0.6–4.6)
LYMPHOCYTES NFR BLD AUTO: 23 %
MCH RBC QN AUTO: 26.4 PG (ref 27–31)
MCHC RBC AUTO-ENTMCNC: 29.7 G/DL (ref 33–36)
MCV RBC AUTO: 89.1 FL (ref 80–94)
MONOCYTES # BLD AUTO: 1.05 X10(3)/MCL (ref 0.1–1.3)
MONOCYTES NFR BLD AUTO: 9 %
NEUTROPHILS # BLD AUTO: 7.53 X10(3)/MCL (ref 2.1–9.2)
NEUTROPHILS NFR BLD AUTO: 64.5 %
NRBC BLD AUTO-RTO: 0 %
PLATELET # BLD AUTO: 329 X10(3)/MCL (ref 130–400)
PMV BLD AUTO: 10 FL (ref 7.4–10.4)
RBC # BLD AUTO: 2.65 X10(6)/MCL (ref 4.2–5.4)
WBC # BLD AUTO: 11.67 X10(3)/MCL (ref 4.5–11.5)

## 2024-11-05 PROCEDURE — 85025 COMPLETE CBC W/AUTO DIFF WBC: CPT | Performed by: FAMILY MEDICINE

## 2024-11-11 ENCOUNTER — LAB REQUISITION (OUTPATIENT)
Dept: LAB | Facility: HOSPITAL | Age: 89
End: 2024-11-11
Payer: MEDICARE

## 2024-11-11 DIAGNOSIS — Z29.9 ENCOUNTER FOR PROPHYLACTIC MEASURES, UNSPECIFIED: ICD-10-CM

## 2024-11-11 LAB
ANISOCYTOSIS BLD QL SMEAR: ABNORMAL
BASOPHILS # BLD AUTO: 0.05 X10(3)/MCL
BASOPHILS NFR BLD AUTO: 0.4 %
EOSINOPHIL # BLD AUTO: 0.23 X10(3)/MCL (ref 0–0.9)
EOSINOPHIL NFR BLD AUTO: 1.8 %
ERYTHROCYTE [DISTWIDTH] IN BLOOD BY AUTOMATED COUNT: 16.3 % (ref 11.5–17)
FERRITIN SERPL-MCNC: 20.6 NG/ML (ref 4.63–204)
HCT VFR BLD AUTO: 26.3 % (ref 37–47)
HGB BLD-MCNC: 7.6 G/DL (ref 12–16)
HYPOCHROMIA BLD QL SMEAR: ABNORMAL
IMM GRANULOCYTES # BLD AUTO: 0.04 X10(3)/MCL (ref 0–0.04)
IMM GRANULOCYTES NFR BLD AUTO: 0.3 %
IRON SERPL-MCNC: 17 UG/DL (ref 50–170)
LYMPHOCYTES # BLD AUTO: 2.78 X10(3)/MCL (ref 0.6–4.6)
LYMPHOCYTES NFR BLD AUTO: 22.2 %
MCH RBC QN AUTO: 25.9 PG (ref 27–31)
MCHC RBC AUTO-ENTMCNC: 28.9 G/DL (ref 33–36)
MCV RBC AUTO: 89.8 FL (ref 80–94)
MONOCYTES # BLD AUTO: 1.19 X10(3)/MCL (ref 0.1–1.3)
MONOCYTES NFR BLD AUTO: 9.5 %
NEUTROPHILS # BLD AUTO: 8.25 X10(3)/MCL (ref 2.1–9.2)
NEUTROPHILS NFR BLD AUTO: 65.8 %
NRBC BLD AUTO-RTO: 0 %
PLATELET # BLD AUTO: 380 X10(3)/MCL (ref 130–400)
PLATELET # BLD EST: ADEQUATE 10*3/UL
PMV BLD AUTO: 10.3 FL (ref 7.4–10.4)
POIKILOCYTOSIS BLD QL SMEAR: SLIGHT
RBC # BLD AUTO: 2.93 X10(6)/MCL (ref 4.2–5.4)
RBC MORPH BLD: ABNORMAL
WBC # BLD AUTO: 12.54 X10(3)/MCL (ref 4.5–11.5)

## 2024-11-11 PROCEDURE — 83540 ASSAY OF IRON: CPT | Performed by: FAMILY MEDICINE

## 2024-11-11 PROCEDURE — 82728 ASSAY OF FERRITIN: CPT | Performed by: FAMILY MEDICINE

## 2024-11-11 PROCEDURE — 85025 COMPLETE CBC W/AUTO DIFF WBC: CPT | Performed by: FAMILY MEDICINE

## 2024-12-03 ENCOUNTER — LAB REQUISITION (OUTPATIENT)
Dept: LAB | Facility: HOSPITAL | Age: 89
End: 2024-12-03
Payer: MEDICARE

## 2024-12-03 DIAGNOSIS — Z29.9 ENCOUNTER FOR PROPHYLACTIC MEASURES, UNSPECIFIED: ICD-10-CM

## 2024-12-03 LAB
ALBUMIN SERPL-MCNC: 2.9 G/DL (ref 3.4–4.8)
ALBUMIN/GLOB SERPL: 1 RATIO (ref 1.1–2)
ALP SERPL-CCNC: 74 UNIT/L (ref 40–150)
ALT SERPL-CCNC: 7 UNIT/L (ref 0–55)
ANION GAP SERPL CALC-SCNC: 11 MEQ/L
AST SERPL-CCNC: 9 UNIT/L (ref 5–34)
BASOPHILS # BLD AUTO: 0.04 X10(3)/MCL
BASOPHILS NFR BLD AUTO: 0.3 %
BILIRUB SERPL-MCNC: 0.1 MG/DL
BUN SERPL-MCNC: 29.3 MG/DL (ref 9.8–20.1)
CALCIUM SERPL-MCNC: 8.7 MG/DL (ref 8.4–10.2)
CHLORIDE SERPL-SCNC: 108 MMOL/L (ref 98–111)
CO2 SERPL-SCNC: 24 MMOL/L (ref 23–31)
CREAT SERPL-MCNC: 1.3 MG/DL (ref 0.55–1.02)
CREAT/UREA NIT SERPL: 23
EOSINOPHIL # BLD AUTO: 0.33 X10(3)/MCL (ref 0–0.9)
EOSINOPHIL NFR BLD AUTO: 2.5 %
ERYTHROCYTE [DISTWIDTH] IN BLOOD BY AUTOMATED COUNT: 15.9 % (ref 11.5–17)
GFR SERPLBLD CREATININE-BSD FMLA CKD-EPI: 38 ML/MIN/1.73/M2
GLOBULIN SER-MCNC: 2.9 GM/DL (ref 2.4–3.5)
GLUCOSE SERPL-MCNC: 110 MG/DL (ref 75–121)
HCT VFR BLD AUTO: 25.7 % (ref 37–47)
HGB BLD-MCNC: 7.5 G/DL (ref 12–16)
IMM GRANULOCYTES # BLD AUTO: 0.05 X10(3)/MCL (ref 0–0.04)
IMM GRANULOCYTES NFR BLD AUTO: 0.4 %
LYMPHOCYTES # BLD AUTO: 2.69 X10(3)/MCL (ref 0.6–4.6)
LYMPHOCYTES NFR BLD AUTO: 20.1 %
MCH RBC QN AUTO: 25.5 PG (ref 27–31)
MCHC RBC AUTO-ENTMCNC: 29.2 G/DL (ref 33–36)
MCV RBC AUTO: 87.4 FL (ref 80–94)
MONOCYTES # BLD AUTO: 1.14 X10(3)/MCL (ref 0.1–1.3)
MONOCYTES NFR BLD AUTO: 8.5 %
NEUTROPHILS # BLD AUTO: 9.1 X10(3)/MCL (ref 2.1–9.2)
NEUTROPHILS NFR BLD AUTO: 68.2 %
NRBC BLD AUTO-RTO: 0 %
PLATELET # BLD AUTO: 346 X10(3)/MCL (ref 130–400)
PMV BLD AUTO: 10 FL (ref 7.4–10.4)
POTASSIUM SERPL-SCNC: 4.8 MMOL/L (ref 3.5–5.1)
PROT SERPL-MCNC: 5.8 GM/DL (ref 5.8–7.6)
RBC # BLD AUTO: 2.94 X10(6)/MCL (ref 4.2–5.4)
SODIUM SERPL-SCNC: 143 MMOL/L (ref 136–145)
WBC # BLD AUTO: 13.35 X10(3)/MCL (ref 4.5–11.5)

## 2024-12-03 PROCEDURE — 80053 COMPREHEN METABOLIC PANEL: CPT | Performed by: FAMILY MEDICINE

## 2024-12-03 PROCEDURE — 85025 COMPLETE CBC W/AUTO DIFF WBC: CPT | Performed by: FAMILY MEDICINE

## 2024-12-30 ENCOUNTER — LAB REQUISITION (OUTPATIENT)
Dept: LAB | Facility: HOSPITAL | Age: 89
End: 2024-12-30
Payer: MEDICARE

## 2024-12-30 DIAGNOSIS — Z29.9 ENCOUNTER FOR PROPHYLACTIC MEASURES, UNSPECIFIED: ICD-10-CM

## 2024-12-30 LAB
BASOPHILS # BLD AUTO: 0.04 X10(3)/MCL
BASOPHILS NFR BLD AUTO: 0.4 %
EOSINOPHIL # BLD AUTO: 0.36 X10(3)/MCL (ref 0–0.9)
EOSINOPHIL NFR BLD AUTO: 3.8 %
ERYTHROCYTE [DISTWIDTH] IN BLOOD BY AUTOMATED COUNT: 16.7 % (ref 11.5–17)
HCT VFR BLD AUTO: 27.1 % (ref 37–47)
HGB BLD-MCNC: 8.2 G/DL (ref 12–16)
IMM GRANULOCYTES # BLD AUTO: 0.03 X10(3)/MCL (ref 0–0.04)
IMM GRANULOCYTES NFR BLD AUTO: 0.3 %
LYMPHOCYTES # BLD AUTO: 2.88 X10(3)/MCL (ref 0.6–4.6)
LYMPHOCYTES NFR BLD AUTO: 30.2 %
MCH RBC QN AUTO: 26 PG (ref 27–31)
MCHC RBC AUTO-ENTMCNC: 30.3 G/DL (ref 33–36)
MCV RBC AUTO: 86 FL (ref 80–94)
MONOCYTES # BLD AUTO: 0.84 X10(3)/MCL (ref 0.1–1.3)
MONOCYTES NFR BLD AUTO: 8.8 %
NEUTROPHILS # BLD AUTO: 5.38 X10(3)/MCL (ref 2.1–9.2)
NEUTROPHILS NFR BLD AUTO: 56.5 %
NRBC BLD AUTO-RTO: 0.2 %
PLATELET # BLD AUTO: 373 X10(3)/MCL (ref 130–400)
PLATELETS.RETICULATED NFR BLD AUTO: 2.1 % (ref 0.9–11.2)
PMV BLD AUTO: 10.5 FL (ref 7.4–10.4)
RBC # BLD AUTO: 3.15 X10(6)/MCL (ref 4.2–5.4)
WBC # BLD AUTO: 9.53 X10(3)/MCL (ref 4.5–11.5)

## 2024-12-30 PROCEDURE — 85025 COMPLETE CBC W/AUTO DIFF WBC: CPT | Performed by: FAMILY MEDICINE

## 2025-01-05 ENCOUNTER — LAB REQUISITION (OUTPATIENT)
Dept: LAB | Facility: HOSPITAL | Age: OVER 89
End: 2025-01-05
Payer: MEDICARE

## 2025-01-05 DIAGNOSIS — Z29.9 ENCOUNTER FOR PROPHYLACTIC MEASURES, UNSPECIFIED: ICD-10-CM

## 2025-01-05 LAB
FLUAV AG UPPER RESP QL IA.RAPID: DETECTED
FLUBV AG UPPER RESP QL IA.RAPID: NOT DETECTED

## 2025-01-05 PROCEDURE — 87502 INFLUENZA DNA AMP PROBE: CPT | Performed by: FAMILY MEDICINE

## 2025-01-06 ENCOUNTER — LAB REQUISITION (OUTPATIENT)
Dept: LAB | Facility: HOSPITAL | Age: OVER 89
End: 2025-01-06
Payer: MEDICARE

## 2025-01-06 DIAGNOSIS — Z29.9 ENCOUNTER FOR PROPHYLACTIC MEASURES, UNSPECIFIED: ICD-10-CM

## 2025-01-06 LAB
ALBUMIN SERPL-MCNC: 2.4 G/DL (ref 3.4–4.8)
ALBUMIN/GLOB SERPL: 0.8 RATIO (ref 1.1–2)
ALP SERPL-CCNC: 52 UNIT/L (ref 40–150)
ALT SERPL-CCNC: 45 UNIT/L (ref 0–55)
ANION GAP SERPL CALC-SCNC: 7 MEQ/L
AST SERPL-CCNC: 63 UNIT/L (ref 5–34)
BILIRUB SERPL-MCNC: 0.2 MG/DL
BUN SERPL-MCNC: 44.3 MG/DL (ref 9.8–20.1)
CALCIUM SERPL-MCNC: 8.1 MG/DL (ref 8.4–10.2)
CHLORIDE SERPL-SCNC: 116 MMOL/L (ref 98–111)
CO2 SERPL-SCNC: 21 MMOL/L (ref 23–31)
CREAT SERPL-MCNC: 1.74 MG/DL (ref 0.55–1.02)
CREAT/UREA NIT SERPL: 25
GFR SERPLBLD CREATININE-BSD FMLA CKD-EPI: 27 ML/MIN/1.73/M2
GLOBULIN SER-MCNC: 2.9 GM/DL (ref 2.4–3.5)
GLUCOSE SERPL-MCNC: 139 MG/DL (ref 75–121)
POTASSIUM SERPL-SCNC: 4.6 MMOL/L (ref 3.5–5.1)
PROT SERPL-MCNC: 5.3 GM/DL (ref 5.8–7.6)
SODIUM SERPL-SCNC: 144 MMOL/L (ref 136–145)

## 2025-01-06 PROCEDURE — 80053 COMPREHEN METABOLIC PANEL: CPT | Performed by: FAMILY MEDICINE

## 2025-01-27 ENCOUNTER — LAB REQUISITION (OUTPATIENT)
Dept: LAB | Facility: HOSPITAL | Age: OVER 89
End: 2025-01-27
Payer: MEDICARE

## 2025-01-27 DIAGNOSIS — E11.9 TYPE 2 DIABETES MELLITUS WITHOUT COMPLICATIONS: ICD-10-CM

## 2025-01-27 LAB — GLUCOSE SERPL-MCNC: 106 MG/DL (ref 75–121)

## 2025-01-27 PROCEDURE — 82947 ASSAY GLUCOSE BLOOD QUANT: CPT | Performed by: FAMILY MEDICINE

## 2025-02-11 ENCOUNTER — LAB REQUISITION (OUTPATIENT)
Dept: LAB | Facility: HOSPITAL | Age: OVER 89
End: 2025-02-11
Payer: MEDICARE

## 2025-02-11 DIAGNOSIS — Z29.9 ENCOUNTER FOR PROPHYLACTIC MEASURES, UNSPECIFIED: ICD-10-CM

## 2025-02-11 LAB
ALBUMIN SERPL-MCNC: 3 G/DL (ref 3.4–4.8)
ALBUMIN/GLOB SERPL: 1 RATIO (ref 1.1–2)
ALP SERPL-CCNC: 75 UNIT/L (ref 40–150)
ALT SERPL-CCNC: 7 UNIT/L (ref 0–55)
ANION GAP SERPL CALC-SCNC: 12 MEQ/L
AST SERPL-CCNC: 10 UNIT/L (ref 5–34)
BASOPHILS # BLD AUTO: 0.03 X10(3)/MCL
BASOPHILS NFR BLD AUTO: 0.2 %
BILIRUB SERPL-MCNC: 0.2 MG/DL
BUN SERPL-MCNC: 44.6 MG/DL (ref 9.8–20.1)
CALCIUM SERPL-MCNC: 8.5 MG/DL (ref 8.4–10.2)
CHLORIDE SERPL-SCNC: 111 MMOL/L (ref 98–111)
CHOLEST SERPL-MCNC: 126 MG/DL
CHOLEST/HDLC SERPL: 2 {RATIO} (ref 0–5)
CO2 SERPL-SCNC: 19 MMOL/L (ref 23–31)
CREAT SERPL-MCNC: 2.23 MG/DL (ref 0.55–1.02)
CREAT/UREA NIT SERPL: 20
EOSINOPHIL # BLD AUTO: 0.18 X10(3)/MCL (ref 0–0.9)
EOSINOPHIL NFR BLD AUTO: 1.4 %
ERYTHROCYTE [DISTWIDTH] IN BLOOD BY AUTOMATED COUNT: 18.9 % (ref 11.5–17)
GFR SERPLBLD CREATININE-BSD FMLA CKD-EPI: 20 ML/MIN/1.73/M2
GLOBULIN SER-MCNC: 3.1 GM/DL (ref 2.4–3.5)
GLUCOSE SERPL-MCNC: 101 MG/DL (ref 75–121)
HCT VFR BLD AUTO: 29.1 % (ref 37–47)
HDLC SERPL-MCNC: 55 MG/DL (ref 35–60)
HGB BLD-MCNC: 8.8 G/DL (ref 12–16)
IMM GRANULOCYTES # BLD AUTO: 0.05 X10(3)/MCL (ref 0–0.04)
IMM GRANULOCYTES NFR BLD AUTO: 0.4 %
LDLC SERPL CALC-MCNC: 48 MG/DL (ref 50–140)
LYMPHOCYTES # BLD AUTO: 2.47 X10(3)/MCL (ref 0.6–4.6)
LYMPHOCYTES NFR BLD AUTO: 18.6 %
MAGNESIUM SERPL-MCNC: 1.3 MG/DL (ref 1.6–2.6)
MCH RBC QN AUTO: 25.3 PG (ref 27–31)
MCHC RBC AUTO-ENTMCNC: 30.2 G/DL (ref 33–36)
MCV RBC AUTO: 83.6 FL (ref 80–94)
MONOCYTES # BLD AUTO: 1.02 X10(3)/MCL (ref 0.1–1.3)
MONOCYTES NFR BLD AUTO: 7.7 %
NEUTROPHILS # BLD AUTO: 9.5 X10(3)/MCL (ref 2.1–9.2)
NEUTROPHILS NFR BLD AUTO: 71.7 %
NRBC BLD AUTO-RTO: 0 %
PLATELET # BLD AUTO: 382 X10(3)/MCL (ref 130–400)
PMV BLD AUTO: 10.7 FL (ref 7.4–10.4)
POTASSIUM SERPL-SCNC: 4.5 MMOL/L (ref 3.5–5.1)
PROT SERPL-MCNC: 6.1 GM/DL (ref 5.8–7.6)
RBC # BLD AUTO: 3.48 X10(6)/MCL (ref 4.2–5.4)
SODIUM SERPL-SCNC: 142 MMOL/L (ref 136–145)
TRIGL SERPL-MCNC: 115 MG/DL (ref 37–140)
TSH SERPL-ACNC: 1.68 UIU/ML (ref 0.35–4.94)
VLDLC SERPL CALC-MCNC: 23 MG/DL
WBC # BLD AUTO: 13.25 X10(3)/MCL (ref 4.5–11.5)

## 2025-02-11 PROCEDURE — 80053 COMPREHEN METABOLIC PANEL: CPT | Performed by: FAMILY MEDICINE

## 2025-02-11 PROCEDURE — 80061 LIPID PANEL: CPT | Performed by: FAMILY MEDICINE

## 2025-02-11 PROCEDURE — 85025 COMPLETE CBC W/AUTO DIFF WBC: CPT | Performed by: FAMILY MEDICINE

## 2025-02-11 PROCEDURE — 83735 ASSAY OF MAGNESIUM: CPT | Performed by: FAMILY MEDICINE

## 2025-02-11 PROCEDURE — 84443 ASSAY THYROID STIM HORMONE: CPT | Performed by: FAMILY MEDICINE

## 2025-02-18 ENCOUNTER — LAB REQUISITION (OUTPATIENT)
Dept: LAB | Facility: HOSPITAL | Age: OVER 89
End: 2025-02-18
Payer: MEDICARE

## 2025-02-18 DIAGNOSIS — E56.9 VITAMIN DEFICIENCY, UNSPECIFIED: ICD-10-CM

## 2025-02-18 LAB
BACTERIA #/AREA URNS AUTO: ABNORMAL /HPF
BILIRUB UR QL STRIP.AUTO: NEGATIVE
CAOX CRY UR QL COMP ASSIST: ABNORMAL
CLARITY UR: ABNORMAL
COLOR UR AUTO: ABNORMAL
GLUCOSE UR QL STRIP: NEGATIVE
HGB UR QL STRIP: NEGATIVE
KETONES UR QL STRIP: NEGATIVE
LEUKOCYTE ESTERASE UR QL STRIP: ABNORMAL
NITRITE UR QL STRIP: NEGATIVE
PH UR STRIP: 5.5 [PH]
PROT UR QL STRIP: NEGATIVE
RBC #/AREA URNS AUTO: ABNORMAL /HPF
SP GR UR STRIP.AUTO: 1.02 (ref 1–1.03)
SQUAMOUS #/AREA URNS AUTO: ABNORMAL /HPF
UROBILINOGEN UR STRIP-ACNC: 0.2
WBC #/AREA URNS AUTO: ABNORMAL /HPF

## 2025-02-18 PROCEDURE — 81003 URINALYSIS AUTO W/O SCOPE: CPT | Performed by: FAMILY MEDICINE

## 2025-02-18 PROCEDURE — 87077 CULTURE AEROBIC IDENTIFY: CPT | Mod: 91 | Performed by: FAMILY MEDICINE

## 2025-02-20 ENCOUNTER — LAB REQUISITION (OUTPATIENT)
Dept: LAB | Facility: HOSPITAL | Age: OVER 89
End: 2025-02-20
Payer: MEDICARE

## 2025-02-20 DIAGNOSIS — E56.9 VITAMIN DEFICIENCY, UNSPECIFIED: ICD-10-CM

## 2025-02-20 LAB
ALBUMIN SERPL-MCNC: 2.6 G/DL (ref 3.4–4.8)
ALBUMIN/GLOB SERPL: 0.9 RATIO (ref 1.1–2)
ALP SERPL-CCNC: 64 UNIT/L (ref 40–150)
ALT SERPL-CCNC: 6 UNIT/L (ref 0–55)
ANION GAP SERPL CALC-SCNC: 6 MEQ/L
AST SERPL-CCNC: 8 UNIT/L (ref 5–34)
BASOPHILS # BLD AUTO: 0.04 X10(3)/MCL
BASOPHILS NFR BLD AUTO: 0.4 %
BILIRUB SERPL-MCNC: 0.2 MG/DL
BUN SERPL-MCNC: 23.6 MG/DL (ref 9.8–20.1)
CALCIUM SERPL-MCNC: 7.7 MG/DL (ref 8.4–10.2)
CHLORIDE SERPL-SCNC: 117 MMOL/L (ref 98–111)
CO2 SERPL-SCNC: 20 MMOL/L (ref 23–31)
CREAT SERPL-MCNC: 1.42 MG/DL (ref 0.55–1.02)
CREAT/UREA NIT SERPL: 17
EOSINOPHIL # BLD AUTO: 0.26 X10(3)/MCL (ref 0–0.9)
EOSINOPHIL NFR BLD AUTO: 2.6 %
ERYTHROCYTE [DISTWIDTH] IN BLOOD BY AUTOMATED COUNT: 19.5 % (ref 11.5–17)
GFR SERPLBLD CREATININE-BSD FMLA CKD-EPI: 34 ML/MIN/1.73/M2
GLOBULIN SER-MCNC: 2.8 GM/DL (ref 2.4–3.5)
GLUCOSE SERPL-MCNC: 95 MG/DL (ref 75–121)
HCT VFR BLD AUTO: 26 % (ref 37–47)
HGB BLD-MCNC: 7.8 G/DL (ref 12–16)
IMM GRANULOCYTES # BLD AUTO: 0.05 X10(3)/MCL (ref 0–0.04)
IMM GRANULOCYTES NFR BLD AUTO: 0.5 %
LYMPHOCYTES # BLD AUTO: 2.35 X10(3)/MCL (ref 0.6–4.6)
LYMPHOCYTES NFR BLD AUTO: 23.3 %
MCH RBC QN AUTO: 25.2 PG (ref 27–31)
MCHC RBC AUTO-ENTMCNC: 30 G/DL (ref 33–36)
MCV RBC AUTO: 83.9 FL (ref 80–94)
MONOCYTES # BLD AUTO: 0.92 X10(3)/MCL (ref 0.1–1.3)
MONOCYTES NFR BLD AUTO: 9.1 %
NEUTROPHILS # BLD AUTO: 6.45 X10(3)/MCL (ref 2.1–9.2)
NEUTROPHILS NFR BLD AUTO: 64.1 %
NRBC BLD AUTO-RTO: 0 %
PLATELET # BLD AUTO: 321 X10(3)/MCL (ref 130–400)
PMV BLD AUTO: 10.8 FL (ref 7.4–10.4)
POTASSIUM SERPL-SCNC: 4.3 MMOL/L (ref 3.5–5.1)
PROT SERPL-MCNC: 5.4 GM/DL (ref 5.8–7.6)
RBC # BLD AUTO: 3.1 X10(6)/MCL (ref 4.2–5.4)
SODIUM SERPL-SCNC: 143 MMOL/L (ref 136–145)
WBC # BLD AUTO: 10.07 X10(3)/MCL (ref 4.5–11.5)

## 2025-02-20 PROCEDURE — 85025 COMPLETE CBC W/AUTO DIFF WBC: CPT | Performed by: FAMILY MEDICINE

## 2025-02-20 PROCEDURE — 80053 COMPREHEN METABOLIC PANEL: CPT | Performed by: FAMILY MEDICINE

## 2025-02-21 LAB
BACTERIA UR CULT: ABNORMAL

## 2025-02-26 ENCOUNTER — LAB REQUISITION (OUTPATIENT)
Dept: LAB | Facility: HOSPITAL | Age: OVER 89
End: 2025-02-26
Payer: MEDICARE

## 2025-02-26 DIAGNOSIS — Z29.9 ENCOUNTER FOR PROPHYLACTIC MEASURES, UNSPECIFIED: ICD-10-CM

## 2025-02-26 LAB
ALBUMIN SERPL-MCNC: 2.8 G/DL (ref 3.4–4.8)
ALBUMIN/GLOB SERPL: 0.8 RATIO (ref 1.1–2)
ALP SERPL-CCNC: 78 UNIT/L (ref 40–150)
ALT SERPL-CCNC: 6 UNIT/L (ref 0–55)
ANION GAP SERPL CALC-SCNC: 12 MEQ/L
AST SERPL-CCNC: 10 UNIT/L (ref 5–34)
BASOPHILS # BLD AUTO: 0.05 X10(3)/MCL
BASOPHILS NFR BLD AUTO: 0.4 %
BILIRUB SERPL-MCNC: 0.2 MG/DL
BUN SERPL-MCNC: 28.1 MG/DL (ref 9.8–20.1)
CALCIUM SERPL-MCNC: 8.1 MG/DL (ref 8.4–10.2)
CHLORIDE SERPL-SCNC: 113 MMOL/L (ref 98–111)
CO2 SERPL-SCNC: 19 MMOL/L (ref 23–31)
CREAT SERPL-MCNC: 1.74 MG/DL (ref 0.55–1.02)
CREAT/UREA NIT SERPL: 16
EOSINOPHIL # BLD AUTO: 0.7 X10(3)/MCL (ref 0–0.9)
EOSINOPHIL NFR BLD AUTO: 5.8 %
ERYTHROCYTE [DISTWIDTH] IN BLOOD BY AUTOMATED COUNT: 19.4 % (ref 11.5–17)
GFR SERPLBLD CREATININE-BSD FMLA CKD-EPI: 27 ML/MIN/1.73/M2
GLOBULIN SER-MCNC: 3.5 GM/DL (ref 2.4–3.5)
GLUCOSE SERPL-MCNC: 121 MG/DL (ref 75–121)
HCT VFR BLD AUTO: 27.8 % (ref 37–47)
HGB BLD-MCNC: 8.4 G/DL (ref 12–16)
IMM GRANULOCYTES # BLD AUTO: 0.07 X10(3)/MCL (ref 0–0.04)
IMM GRANULOCYTES NFR BLD AUTO: 0.6 %
LYMPHOCYTES # BLD AUTO: 1.7 X10(3)/MCL (ref 0.6–4.6)
LYMPHOCYTES NFR BLD AUTO: 14.1 %
MAGNESIUM SERPL-MCNC: 1 MG/DL (ref 1.6–2.6)
MCH RBC QN AUTO: 24.8 PG (ref 27–31)
MCHC RBC AUTO-ENTMCNC: 30.2 G/DL (ref 33–36)
MCV RBC AUTO: 82 FL (ref 80–94)
MONOCYTES # BLD AUTO: 1.18 X10(3)/MCL (ref 0.1–1.3)
MONOCYTES NFR BLD AUTO: 9.8 %
NEUTROPHILS # BLD AUTO: 8.33 X10(3)/MCL (ref 2.1–9.2)
NEUTROPHILS NFR BLD AUTO: 69.3 %
NRBC BLD AUTO-RTO: 0 %
PLATELET # BLD AUTO: 418 X10(3)/MCL (ref 130–400)
PMV BLD AUTO: 10.2 FL (ref 7.4–10.4)
POTASSIUM SERPL-SCNC: 4.4 MMOL/L (ref 3.5–5.1)
PROT SERPL-MCNC: 6.3 GM/DL (ref 5.8–7.6)
RBC # BLD AUTO: 3.39 X10(6)/MCL (ref 4.2–5.4)
SODIUM SERPL-SCNC: 144 MMOL/L (ref 136–145)
TSH SERPL-ACNC: 4.01 UIU/ML (ref 0.35–4.94)
WBC # BLD AUTO: 12.03 X10(3)/MCL (ref 4.5–11.5)

## 2025-02-26 PROCEDURE — 80053 COMPREHEN METABOLIC PANEL: CPT | Performed by: FAMILY MEDICINE

## 2025-02-26 PROCEDURE — 83735 ASSAY OF MAGNESIUM: CPT | Performed by: FAMILY MEDICINE

## 2025-02-26 PROCEDURE — 84443 ASSAY THYROID STIM HORMONE: CPT | Performed by: FAMILY MEDICINE

## 2025-02-26 PROCEDURE — 85025 COMPLETE CBC W/AUTO DIFF WBC: CPT | Performed by: FAMILY MEDICINE

## 2025-03-14 ENCOUNTER — LAB REQUISITION (OUTPATIENT)
Dept: LAB | Facility: HOSPITAL | Age: OVER 89
End: 2025-03-14
Payer: MEDICARE

## 2025-03-14 DIAGNOSIS — E11.9 TYPE 2 DIABETES MELLITUS WITHOUT COMPLICATIONS: ICD-10-CM

## 2025-03-14 LAB — GLUCOSE SERPL-MCNC: 68 MG/DL (ref 75–121)

## 2025-03-14 PROCEDURE — 82947 ASSAY GLUCOSE BLOOD QUANT: CPT | Performed by: FAMILY MEDICINE

## 2025-04-15 ENCOUNTER — LAB REQUISITION (OUTPATIENT)
Dept: LAB | Facility: HOSPITAL | Age: OVER 89
End: 2025-04-15
Payer: MEDICARE

## 2025-04-15 DIAGNOSIS — E11.9 TYPE 2 DIABETES MELLITUS WITHOUT COMPLICATIONS: ICD-10-CM

## 2025-04-15 LAB
ALBUMIN SERPL-MCNC: 2.4 G/DL (ref 3.4–4.8)
ALBUMIN/GLOB SERPL: 0.9 RATIO (ref 1.1–2)
ALP SERPL-CCNC: 80 UNIT/L (ref 40–150)
ALT SERPL-CCNC: 5 UNIT/L (ref 0–55)
ANION GAP SERPL CALC-SCNC: 7 MEQ/L
AST SERPL-CCNC: 10 UNIT/L (ref 11–45)
BASOPHILS # BLD AUTO: 0.04 X10(3)/MCL
BASOPHILS NFR BLD AUTO: 0.4 %
BILIRUB SERPL-MCNC: 0.2 MG/DL
BUN SERPL-MCNC: 28 MG/DL (ref 9.8–20.1)
CALCIUM SERPL-MCNC: 7.8 MG/DL (ref 8.4–10.2)
CHLORIDE SERPL-SCNC: 114 MMOL/L (ref 98–111)
CHOLEST SERPL-MCNC: 128 MG/DL
CHOLEST/HDLC SERPL: 2 {RATIO} (ref 0–5)
CO2 SERPL-SCNC: 23 MMOL/L (ref 23–31)
CREAT SERPL-MCNC: 1.59 MG/DL (ref 0.55–1.02)
CREAT/UREA NIT SERPL: 18
EOSINOPHIL # BLD AUTO: 0.37 X10(3)/MCL (ref 0–0.9)
EOSINOPHIL NFR BLD AUTO: 4 %
ERYTHROCYTE [DISTWIDTH] IN BLOOD BY AUTOMATED COUNT: 20.7 % (ref 11.5–17)
EST. AVERAGE GLUCOSE BLD GHB EST-MCNC: 134.1 MG/DL
GFR SERPLBLD CREATININE-BSD FMLA CKD-EPI: 30 ML/MIN/1.73/M2
GLOBULIN SER-MCNC: 2.7 GM/DL (ref 2.4–3.5)
GLUCOSE SERPL-MCNC: 84 MG/DL (ref 75–121)
HBA1C MFR BLD: 6.3 %
HCT VFR BLD AUTO: 21.8 % (ref 37–47)
HDLC SERPL-MCNC: 55 MG/DL (ref 35–60)
HGB BLD-MCNC: 6.6 G/DL (ref 12–16)
IMM GRANULOCYTES # BLD AUTO: 0.02 X10(3)/MCL (ref 0–0.04)
IMM GRANULOCYTES NFR BLD AUTO: 0.2 %
LDLC SERPL CALC-MCNC: 55 MG/DL (ref 50–140)
LYMPHOCYTES # BLD AUTO: 2.05 X10(3)/MCL (ref 0.6–4.6)
LYMPHOCYTES NFR BLD AUTO: 22.4 %
MCH RBC QN AUTO: 26.3 PG (ref 27–31)
MCHC RBC AUTO-ENTMCNC: 30.3 G/DL (ref 33–36)
MCV RBC AUTO: 86.9 FL (ref 80–94)
MONOCYTES # BLD AUTO: 1.02 X10(3)/MCL (ref 0.1–1.3)
MONOCYTES NFR BLD AUTO: 11.1 %
NEUTROPHILS # BLD AUTO: 5.65 X10(3)/MCL (ref 2.1–9.2)
NEUTROPHILS NFR BLD AUTO: 61.9 %
NRBC BLD AUTO-RTO: 0 %
PLATELET # BLD AUTO: 338 X10(3)/MCL (ref 130–400)
PMV BLD AUTO: 10.3 FL (ref 7.4–10.4)
POTASSIUM SERPL-SCNC: 4.6 MMOL/L (ref 3.5–5.1)
PREALB SERPL-MCNC: 15.7 MG/DL (ref 14–37)
PROT SERPL-MCNC: 5.1 GM/DL (ref 5.8–7.6)
RBC # BLD AUTO: 2.51 X10(6)/MCL (ref 4.2–5.4)
SODIUM SERPL-SCNC: 144 MMOL/L (ref 136–145)
TRIGL SERPL-MCNC: 88 MG/DL (ref 37–140)
VLDLC SERPL CALC-MCNC: 18 MG/DL
WBC # BLD AUTO: 9.15 X10(3)/MCL (ref 4.5–11.5)

## 2025-04-15 PROCEDURE — 84134 ASSAY OF PREALBUMIN: CPT | Performed by: FAMILY MEDICINE

## 2025-04-15 PROCEDURE — 80053 COMPREHEN METABOLIC PANEL: CPT | Performed by: FAMILY MEDICINE

## 2025-04-15 PROCEDURE — 80061 LIPID PANEL: CPT | Performed by: FAMILY MEDICINE

## 2025-04-15 PROCEDURE — 83036 HEMOGLOBIN GLYCOSYLATED A1C: CPT | Performed by: FAMILY MEDICINE

## 2025-04-15 PROCEDURE — 85025 COMPLETE CBC W/AUTO DIFF WBC: CPT | Performed by: FAMILY MEDICINE

## 2025-04-21 ENCOUNTER — LAB REQUISITION (OUTPATIENT)
Dept: LAB | Facility: HOSPITAL | Age: OVER 89
End: 2025-04-21
Payer: MEDICARE

## 2025-04-21 DIAGNOSIS — Z29.9 ENCOUNTER FOR PROPHYLACTIC MEASURES, UNSPECIFIED: ICD-10-CM

## 2025-04-21 LAB
BASOPHILS # BLD AUTO: 0.05 X10(3)/MCL
BASOPHILS NFR BLD AUTO: 0.6 %
EOSINOPHIL # BLD AUTO: 0.38 X10(3)/MCL (ref 0–0.9)
EOSINOPHIL NFR BLD AUTO: 4.3 %
ERYTHROCYTE [DISTWIDTH] IN BLOOD BY AUTOMATED COUNT: 18.1 % (ref 11.5–17)
HCT VFR BLD AUTO: 30.5 % (ref 37–47)
HGB BLD-MCNC: 9.5 G/DL (ref 12–16)
IMM GRANULOCYTES # BLD AUTO: 0.02 X10(3)/MCL (ref 0–0.04)
IMM GRANULOCYTES NFR BLD AUTO: 0.2 %
LYMPHOCYTES # BLD AUTO: 3.01 X10(3)/MCL (ref 0.6–4.6)
LYMPHOCYTES NFR BLD AUTO: 33.7 %
MCH RBC QN AUTO: 27.5 PG (ref 27–31)
MCHC RBC AUTO-ENTMCNC: 31.1 G/DL (ref 33–36)
MCV RBC AUTO: 88.4 FL (ref 80–94)
MONOCYTES # BLD AUTO: 0.86 X10(3)/MCL (ref 0.1–1.3)
MONOCYTES NFR BLD AUTO: 9.6 %
NEUTROPHILS # BLD AUTO: 4.62 X10(3)/MCL (ref 2.1–9.2)
NEUTROPHILS NFR BLD AUTO: 51.6 %
NRBC BLD AUTO-RTO: 0 %
PLATELET # BLD AUTO: 289 X10(3)/MCL (ref 130–400)
PMV BLD AUTO: 10.5 FL (ref 7.4–10.4)
RBC # BLD AUTO: 3.45 X10(6)/MCL (ref 4.2–5.4)
WBC # BLD AUTO: 8.94 X10(3)/MCL (ref 4.5–11.5)

## 2025-04-21 PROCEDURE — 85025 COMPLETE CBC W/AUTO DIFF WBC: CPT | Performed by: FAMILY MEDICINE

## 2025-04-27 ENCOUNTER — LAB REQUISITION (OUTPATIENT)
Dept: LAB | Facility: HOSPITAL | Age: OVER 89
End: 2025-04-27
Payer: MEDICARE

## 2025-04-27 DIAGNOSIS — Z29.9 ENCOUNTER FOR PROPHYLACTIC MEASURES, UNSPECIFIED: ICD-10-CM

## 2025-04-27 LAB
HEMOCCULT SP1 STL QL: NEGATIVE
HEMOCCULT SP1 STL QL: NEGATIVE

## 2025-04-27 PROCEDURE — 82270 OCCULT BLOOD FECES: CPT | Performed by: FAMILY MEDICINE

## 2025-05-23 ENCOUNTER — LAB REQUISITION (OUTPATIENT)
Dept: LAB | Facility: HOSPITAL | Age: OVER 89
End: 2025-05-23
Payer: MEDICARE

## 2025-05-23 DIAGNOSIS — E11.9 TYPE 2 DIABETES MELLITUS WITHOUT COMPLICATIONS: ICD-10-CM

## 2025-05-23 LAB — GLUCOSE SERPL-MCNC: 96 MG/DL (ref 75–121)

## 2025-05-23 PROCEDURE — 82947 ASSAY GLUCOSE BLOOD QUANT: CPT | Performed by: FAMILY MEDICINE

## 2025-06-11 ENCOUNTER — LAB REQUISITION (OUTPATIENT)
Dept: LAB | Facility: HOSPITAL | Age: OVER 89
End: 2025-06-11
Payer: MEDICARE

## 2025-06-11 DIAGNOSIS — E11.9 TYPE 2 DIABETES MELLITUS WITHOUT COMPLICATIONS: ICD-10-CM

## 2025-06-11 LAB
EST. AVERAGE GLUCOSE BLD GHB EST-MCNC: 125.5 MG/DL
HBA1C MFR BLD: 6 %

## 2025-06-11 PROCEDURE — 83036 HEMOGLOBIN GLYCOSYLATED A1C: CPT | Performed by: FAMILY MEDICINE

## 2025-07-17 ENCOUNTER — LAB REQUISITION (OUTPATIENT)
Dept: LAB | Facility: HOSPITAL | Age: OVER 89
End: 2025-07-17
Payer: MEDICARE

## 2025-07-17 DIAGNOSIS — E11.9 TYPE 2 DIABETES MELLITUS WITHOUT COMPLICATIONS: ICD-10-CM

## 2025-07-17 LAB
EST. AVERAGE GLUCOSE BLD GHB EST-MCNC: 122.6 MG/DL
GLUCOSE SERPL-MCNC: 88 MG/DL (ref 75–121)
HBA1C MFR BLD: 5.9 %

## 2025-07-17 PROCEDURE — 82947 ASSAY GLUCOSE BLOOD QUANT: CPT | Performed by: FAMILY MEDICINE

## 2025-07-17 PROCEDURE — 83036 HEMOGLOBIN GLYCOSYLATED A1C: CPT | Performed by: FAMILY MEDICINE

## 2025-08-20 ENCOUNTER — LAB REQUISITION (OUTPATIENT)
Dept: LAB | Facility: HOSPITAL | Age: OVER 89
End: 2025-08-20
Payer: MEDICARE

## 2025-08-20 DIAGNOSIS — Z29.9 ENCOUNTER FOR PROPHYLACTIC MEASURES, UNSPECIFIED: ICD-10-CM

## 2025-08-20 LAB
ALBUMIN SERPL-MCNC: 3 G/DL (ref 3.4–4.8)
ALBUMIN/GLOB SERPL: 0.8 RATIO (ref 1.1–2)
ALP SERPL-CCNC: 76 UNIT/L (ref 40–150)
ALT SERPL-CCNC: 10 UNIT/L (ref 0–55)
ANION GAP SERPL CALC-SCNC: 15 MEQ/L
AST SERPL-CCNC: 19 UNIT/L (ref 11–45)
BASOPHILS # BLD AUTO: 0.05 X10(3)/MCL
BASOPHILS NFR BLD AUTO: 0.5 %
BILIRUB SERPL-MCNC: 0.3 MG/DL
BUN SERPL-MCNC: 72.3 MG/DL (ref 9.8–20.1)
CALCIUM SERPL-MCNC: 8.7 MG/DL (ref 8.4–10.2)
CHLORIDE SERPL-SCNC: 109 MMOL/L (ref 98–111)
CO2 SERPL-SCNC: 17 MMOL/L (ref 23–31)
CREAT SERPL-MCNC: 5.72 MG/DL (ref 0.55–1.02)
CREAT/UREA NIT SERPL: 13
EOSINOPHIL # BLD AUTO: 0.31 X10(3)/MCL (ref 0–0.9)
EOSINOPHIL NFR BLD AUTO: 3.4 %
ERYTHROCYTE [DISTWIDTH] IN BLOOD BY AUTOMATED COUNT: 15.2 % (ref 11.5–17)
GFR SERPLBLD CREATININE-BSD FMLA CKD-EPI: 6 ML/MIN/1.73/M2
GLOBULIN SER-MCNC: 3.7 GM/DL (ref 2.4–3.5)
GLUCOSE SERPL-MCNC: 78 MG/DL (ref 75–121)
HCT VFR BLD AUTO: 32.8 % (ref 37–47)
HGB BLD-MCNC: 10.3 G/DL (ref 12–16)
IMM GRANULOCYTES # BLD AUTO: 0.02 X10(3)/MCL (ref 0–0.04)
IMM GRANULOCYTES NFR BLD AUTO: 0.2 %
LYMPHOCYTES # BLD AUTO: 2.36 X10(3)/MCL (ref 0.6–4.6)
LYMPHOCYTES NFR BLD AUTO: 25.8 %
MCH RBC QN AUTO: 29.7 PG (ref 27–31)
MCHC RBC AUTO-ENTMCNC: 31.4 G/DL (ref 33–36)
MCV RBC AUTO: 94.5 FL (ref 80–94)
MONOCYTES # BLD AUTO: 0.93 X10(3)/MCL (ref 0.1–1.3)
MONOCYTES NFR BLD AUTO: 10.2 %
NEUTROPHILS # BLD AUTO: 5.47 X10(3)/MCL (ref 2.1–9.2)
NEUTROPHILS NFR BLD AUTO: 59.9 %
NRBC BLD AUTO-RTO: 0 %
PLATELET # BLD AUTO: 354 X10(3)/MCL (ref 130–400)
PMV BLD AUTO: 10.3 FL (ref 7.4–10.4)
POTASSIUM SERPL-SCNC: 5.6 MMOL/L (ref 3.5–5.1)
PROT SERPL-MCNC: 6.7 GM/DL (ref 5.8–7.6)
RBC # BLD AUTO: 3.47 X10(6)/MCL (ref 4.2–5.4)
SODIUM SERPL-SCNC: 141 MMOL/L (ref 136–145)
WBC # BLD AUTO: 9.14 X10(3)/MCL (ref 4.5–11.5)

## 2025-08-20 PROCEDURE — 85025 COMPLETE CBC W/AUTO DIFF WBC: CPT | Performed by: FAMILY MEDICINE

## 2025-08-20 PROCEDURE — 80053 COMPREHEN METABOLIC PANEL: CPT | Performed by: FAMILY MEDICINE

## 2025-08-21 ENCOUNTER — LAB REQUISITION (OUTPATIENT)
Dept: LAB | Facility: HOSPITAL | Age: OVER 89
End: 2025-08-21
Payer: MEDICARE

## 2025-08-21 DIAGNOSIS — E11.9 TYPE 2 DIABETES MELLITUS WITHOUT COMPLICATIONS: ICD-10-CM

## 2025-08-21 LAB — GLUCOSE SERPL-MCNC: 94 MG/DL (ref 75–121)

## 2025-08-21 PROCEDURE — 82947 ASSAY GLUCOSE BLOOD QUANT: CPT | Performed by: FAMILY MEDICINE

## 2025-08-26 ENCOUNTER — LAB REQUISITION (OUTPATIENT)
Dept: LAB | Facility: HOSPITAL | Age: OVER 89
End: 2025-08-26
Payer: MEDICARE

## 2025-08-26 DIAGNOSIS — Z29.9 ENCOUNTER FOR PROPHYLACTIC MEASURES, UNSPECIFIED: ICD-10-CM

## 2025-08-26 LAB
ALBUMIN SERPL-MCNC: 2.6 G/DL (ref 3.4–4.8)
ALBUMIN/GLOB SERPL: 0.9 RATIO (ref 1.1–2)
ALP SERPL-CCNC: 61 UNIT/L (ref 40–150)
ALT SERPL-CCNC: 12 UNIT/L (ref 0–55)
ANION GAP SERPL CALC-SCNC: 9 MEQ/L
AST SERPL-CCNC: 17 UNIT/L (ref 11–45)
BASOPHILS # BLD AUTO: 0.03 X10(3)/MCL
BASOPHILS NFR BLD AUTO: 0.3 %
BILIRUB SERPL-MCNC: 0.2 MG/DL
BUN SERPL-MCNC: 32.6 MG/DL (ref 9.8–20.1)
CALCIUM SERPL-MCNC: 8.3 MG/DL (ref 8.4–10.2)
CHLORIDE SERPL-SCNC: 111 MMOL/L (ref 98–111)
CHOLEST SERPL-MCNC: 137 MG/DL
CHOLEST/HDLC SERPL: 3 {RATIO} (ref 0–5)
CO2 SERPL-SCNC: 24 MMOL/L (ref 23–31)
CREAT SERPL-MCNC: 2.07 MG/DL (ref 0.55–1.02)
CREAT/UREA NIT SERPL: 16
EOSINOPHIL # BLD AUTO: 0.35 X10(3)/MCL (ref 0–0.9)
EOSINOPHIL NFR BLD AUTO: 4 %
ERYTHROCYTE [DISTWIDTH] IN BLOOD BY AUTOMATED COUNT: 14.6 % (ref 11.5–17)
EST. AVERAGE GLUCOSE BLD GHB EST-MCNC: 125.5 MG/DL
GFR SERPLBLD CREATININE-BSD FMLA CKD-EPI: 22 ML/MIN/1.73/M2
GLOBULIN SER-MCNC: 2.9 GM/DL (ref 2.4–3.5)
GLUCOSE SERPL-MCNC: 110 MG/DL (ref 75–121)
HBA1C MFR BLD: 6 %
HCT VFR BLD AUTO: 27.5 % (ref 37–47)
HDLC SERPL-MCNC: 47 MG/DL (ref 35–60)
HGB BLD-MCNC: 8.7 G/DL (ref 12–16)
IMM GRANULOCYTES # BLD AUTO: 0.03 X10(3)/MCL (ref 0–0.04)
IMM GRANULOCYTES NFR BLD AUTO: 0.3 %
LDLC SERPL CALC-MCNC: 66 MG/DL (ref 50–140)
LYMPHOCYTES # BLD AUTO: 2.71 X10(3)/MCL (ref 0.6–4.6)
LYMPHOCYTES NFR BLD AUTO: 31 %
MCH RBC QN AUTO: 29.6 PG (ref 27–31)
MCHC RBC AUTO-ENTMCNC: 31.6 G/DL (ref 33–36)
MCV RBC AUTO: 93.5 FL (ref 80–94)
MONOCYTES # BLD AUTO: 0.86 X10(3)/MCL (ref 0.1–1.3)
MONOCYTES NFR BLD AUTO: 9.8 %
NEUTROPHILS # BLD AUTO: 4.77 X10(3)/MCL (ref 2.1–9.2)
NEUTROPHILS NFR BLD AUTO: 54.6 %
NRBC BLD AUTO-RTO: 0 %
PLATELET # BLD AUTO: 295 X10(3)/MCL (ref 130–400)
PMV BLD AUTO: 10.6 FL (ref 7.4–10.4)
POTASSIUM SERPL-SCNC: 4.4 MMOL/L (ref 3.5–5.1)
PREALB SERPL-MCNC: 20.4 MG/DL (ref 14–37)
PROT SERPL-MCNC: 5.5 GM/DL (ref 5.8–7.6)
RBC # BLD AUTO: 2.94 X10(6)/MCL (ref 4.2–5.4)
SODIUM SERPL-SCNC: 144 MMOL/L (ref 136–145)
TRIGL SERPL-MCNC: 122 MG/DL (ref 37–140)
TSH SERPL-ACNC: 2.44 UIU/ML (ref 0.35–4.94)
VLDLC SERPL CALC-MCNC: 24 MG/DL
WBC # BLD AUTO: 8.75 X10(3)/MCL (ref 4.5–11.5)

## 2025-08-26 PROCEDURE — 84443 ASSAY THYROID STIM HORMONE: CPT | Performed by: FAMILY MEDICINE

## 2025-08-26 PROCEDURE — 80053 COMPREHEN METABOLIC PANEL: CPT | Performed by: FAMILY MEDICINE

## 2025-08-26 PROCEDURE — 85025 COMPLETE CBC W/AUTO DIFF WBC: CPT | Performed by: FAMILY MEDICINE

## 2025-08-26 PROCEDURE — 80061 LIPID PANEL: CPT | Performed by: FAMILY MEDICINE

## 2025-08-26 PROCEDURE — 83036 HEMOGLOBIN GLYCOSYLATED A1C: CPT | Performed by: FAMILY MEDICINE

## 2025-08-26 PROCEDURE — 84134 ASSAY OF PREALBUMIN: CPT | Performed by: FAMILY MEDICINE
